# Patient Record
Sex: MALE | Race: WHITE
[De-identification: names, ages, dates, MRNs, and addresses within clinical notes are randomized per-mention and may not be internally consistent; named-entity substitution may affect disease eponyms.]

---

## 2017-07-16 ENCOUNTER — HOSPITAL ENCOUNTER (OUTPATIENT)
Dept: HOSPITAL 76 - EMS | Age: 82
Discharge: ANOTHER HOSPITAL | End: 2017-07-16
Attending: SURGERY
Payer: MEDICARE

## 2017-07-16 ENCOUNTER — HOSPITAL ENCOUNTER (EMERGENCY)
Dept: HOSPITAL 76 - ED | Age: 82
Discharge: HOME | End: 2017-07-16
Payer: MEDICARE

## 2017-07-16 VITALS — SYSTOLIC BLOOD PRESSURE: 133 MMHG | DIASTOLIC BLOOD PRESSURE: 65 MMHG

## 2017-07-16 DIAGNOSIS — I49.3: ICD-10-CM

## 2017-07-16 DIAGNOSIS — K27.9: Primary | ICD-10-CM

## 2017-07-16 DIAGNOSIS — N30.00: ICD-10-CM

## 2017-07-16 DIAGNOSIS — E03.9: ICD-10-CM

## 2017-07-16 DIAGNOSIS — Z79.82: ICD-10-CM

## 2017-07-16 DIAGNOSIS — R10.9: Primary | ICD-10-CM

## 2017-07-16 DIAGNOSIS — R00.1: ICD-10-CM

## 2017-07-16 LAB
ALBUMIN/GLOB SERPL: 1.6 {RATIO} (ref 1–2.2)
ANION GAP SERPL CALCULATED.4IONS-SCNC: 8 MMOL/L (ref 6–13)
BASOPHILS NFR BLD AUTO: 0.1 10^3/UL (ref 0–0.1)
BASOPHILS NFR BLD AUTO: 0.8 %
BILIRUB BLD-MCNC: 0.9 MG/DL (ref 0.2–1)
BUN SERPL-MCNC: 14 MG/DL (ref 6–20)
CALCIUM UR-MCNC: 9 MG/DL (ref 8.5–10.3)
CHLORIDE SERPL-SCNC: 93 MMOL/L (ref 101–111)
CO2 SERPL-SCNC: 23 MMOL/L (ref 21–32)
CREAT SERPLBLD-SCNC: 1.3 MG/DL (ref 0.6–1.2)
CUL URINE ADD CHARGE: (no result)
EOSINOPHIL # BLD AUTO: 0.1 10^3/UL (ref 0–0.7)
EOSINOPHIL NFR BLD AUTO: 0.6 %
ERYTHROCYTE [DISTWIDTH] IN BLOOD BY AUTOMATED COUNT: 13.1 % (ref 12–15)
GFRSERPLBLD MDRD-ARVRAT: 52 ML/MIN/{1.73_M2} (ref 89–?)
GLOBULIN SER-MCNC: 2.6 G/DL (ref 2.1–4.2)
GLUCOSE SERPL-MCNC: 92 MG/DL (ref 70–100)
HCT VFR BLD AUTO: 35.8 % (ref 42–52)
HGB UR QL STRIP: 12.4 G/DL (ref 14–18)
LIPASE SERPL-CCNC: 17 U/L (ref 22–51)
LYMPHOCYTES # SPEC AUTO: 1.3 10^3/UL (ref 1.5–3.5)
LYMPHOCYTES NFR BLD AUTO: 12.8 %
MCH RBC QN AUTO: 30.1 PG (ref 27–31)
MCHC RBC AUTO-ENTMCNC: 34.7 G/DL (ref 32–36)
MCV RBC AUTO: 86.8 FL (ref 80–94)
MONOCYTES # BLD AUTO: 0.8 10^3/UL (ref 0–1)
MONOCYTES NFR BLD AUTO: 8.2 %
NEUTROPHILS # BLD AUTO: 8 10^3/UL (ref 1.5–6.6)
NEUTROPHILS # SNV AUTO: 10.3 X10^3/UL (ref 4.8–10.8)
NEUTROPHILS NFR BLD AUTO: 77.6 %
NRBC # BLD AUTO: 0 /100WBC
PDW BLD AUTO: 7.6 FL (ref 7.4–11.4)
PH UR STRIP.AUTO: 7 PH (ref 5–7.5)
POTASSIUM SERPL-SCNC: 4.3 MMOL/L (ref 3.5–5)
PROT SPEC-MCNC: 6.7 G/DL (ref 6.7–8.2)
RBC MAR: 4.12 10^6/UL (ref 4.7–6.1)
SODIUM SERPLBLD-SCNC: 124 MMOL/L (ref 135–145)
SP GR UR STRIP.AUTO: <=1.005 (ref 1–1.03)
UA CHARGE (STRIP ONLY): (no result)
UA W/ MICROSCOPIC CHARGE: YES
UR CULTURE IF IND: (no result)
UROBILINOGEN UR STRIP.AUTO-MCNC: NEGATIVE MG/DL
WBC # BLD: 10.3 X10^3/UL

## 2017-07-16 PROCEDURE — 84484 ASSAY OF TROPONIN QUANT: CPT

## 2017-07-16 PROCEDURE — 74177 CT ABD & PELVIS W/CONTRAST: CPT

## 2017-07-16 PROCEDURE — 99284 EMERGENCY DEPT VISIT MOD MDM: CPT

## 2017-07-16 PROCEDURE — 85025 COMPLETE CBC W/AUTO DIFF WBC: CPT

## 2017-07-16 PROCEDURE — 80053 COMPREHEN METABOLIC PANEL: CPT

## 2017-07-16 PROCEDURE — 81003 URINALYSIS AUTO W/O SCOPE: CPT

## 2017-07-16 PROCEDURE — 96374 THER/PROPH/DIAG INJ IV PUSH: CPT

## 2017-07-16 PROCEDURE — 93005 ELECTROCARDIOGRAM TRACING: CPT

## 2017-07-16 PROCEDURE — 83690 ASSAY OF LIPASE: CPT

## 2017-07-16 PROCEDURE — 81001 URINALYSIS AUTO W/SCOPE: CPT

## 2017-07-16 PROCEDURE — 87086 URINE CULTURE/COLONY COUNT: CPT

## 2017-07-16 PROCEDURE — 36415 COLL VENOUS BLD VENIPUNCTURE: CPT

## 2017-07-16 NOTE — ED PHYSICIAN DOCUMENTATION
PD HPI ABD PAIN





- Stated complaint


Stated Complaint: ABD PX





- Chief complaint


Chief Complaint: Abd Pain





- History obtained from


History obtained from: Patient





- History of Present Illness


Timing - onset: Last night


Timing - duration: Hours


Timing - details: Gradual onset, Still present


Pain level max: 8


Pain level now: 2


Quality: Sharp, Pain


Location: Epigastric


Radiation: No: Chest, , Lower back, Left flank, Left shoulder, Right flank, 

Right shoulder, Upper back


Improved by: Laying still


Worsened by: Moving, Breathing, Position, Palpation


Associated symptoms: Constipation


Similar symptoms before: Has not had sx before


Recently seen: Not recently seen





- Additional information


Additional information: 





87-year-old male with a history of atrial fibrillation intermittent and thyroid 

disease has developed acute epigastric abdominal pain that started yesterday 

evening and was present all night.  He is now somewhat more comfortable here in 

the emergency department with the pain only in the about a 2 out of 10.  He 

does have some constipation on a regular basis but does not think this is 

related.  He has not had any other specific symptoms he has not had any nausea 

or vomiting he has not had any diarrhea.





Review of Systems


Constitutional: denies: Fever, Chills, Myalgias, Fatigue


Eyes: denies: Decreased vision


Ears: denies: Ear pain


Nose: denies: Congestion


Throat: denies: Oral lesions / sores, Sore throat


Cardiac: denies: Chest pain / pressure, Palpitations


Respiratory: denies: Dyspnea, Cough


GI: reports: Abdominal Pain.  denies: Nausea, Vomiting, Diarrhea


: denies: Dysuria, Frequency


Skin: denies: Rash


Musculoskeletal: denies: Neck pain, Back pain, Extremity pain


Neurologic: denies: Generalized weakness, Focal weakness, Numbness





PD PAST MEDICAL HISTORY





- Past Medical History


Cardiovascular: Atrial fibrillation


Endocrine/Autoimmune: HyPOthyroidism


: Benign prostate hypertrophy





- Past Surgical History


Past Surgical History: Yes





- Present Medications


Home Medications: 


 Ambulatory Orders











 Medication  Instructions  Recorded  Confirmed


 


Aspirin 81 mg 07/16/17 


 


Gabapentin [Gabapentin] 300 mg PO DAILY 07/16/17 07/16/17


 


Ipratropium Bromide  07/16/17 


 


Levofloxacin [Levaquin] 500 mg PO DAILY #7 tablet 07/16/17 


 


Levothyroxine Sodium [Synthroid] 137 mcg PO DAILY 07/16/17 07/16/17


 


Omeprazole 20 mg PO DAILY #30 tablet. 07/16/17 


 


Sucralfate [Carafate] 1 gm PO ACHS #400 ml 07/16/17 


 


Tamsulosin HCl [Tamsulosin HCl] 0.4 mg PO DAILY 07/16/17 07/16/17


 


Zolpidem Tartrate [Zolpidem 10 mg PO DAILY 07/16/17 07/16/17





Tartrate]   














- Allergies


Allergies/Adverse Reactions: 


 Allergies











Allergy/AdvReac Type Severity Reaction Status Date / Time


 


No Known Drug Allergies Allergy   Verified 07/16/17 11:12














- Social History


Does the pt smoke?: No


Smoking Status: Never smoker





- Immunizations


Immunizations are current?: Yes





PD ED PE NORMAL





- Vitals


Vital signs reviewed: Yes (hypertensive )





- General


General: Alert and oriented X 3, No acute distress, Well developed/nourished





- HEENT


HEENT: Atraumatic, PERRL





- Neck


Neck: Supple, no meningeal sign





- Cardiac


Cardiac: RRR, No murmur





- Respiratory


Respiratory: No respiratory distress, Clear bilaterally





- Abdomen


Abdomen: Soft, Other (There is midline point tenderness but no palpable 

pulsitile mass. There is no audible bruit. There is not right upper quadrant 

tenderness. )





- Back


Back: No CVA TTP, No spinal TTP





- Derm


Derm: Normal color, Warm and dry, No rash





- Extremities


Extremities: No deformity, No edema





- Neuro


Neuro: No motor deficit, No sensory deficit





- Psych


Psych: Normal mood, Normal affect





Results





- Vitals


Vitals: 


 Vital Signs - 24 hr











  07/16/17 07/16/17





  11:00 12:10


 


Temperature 36.2 C L 


 


Heart Rate 67 59 L


 


Respiratory 16 16





Rate  


 


Blood Pressure 149/123 H 124/63


 


O2 Saturation 98 97








 Oxygen











O2 Source                      Room air

















- Labs


Labs: 


 Laboratory Tests











  07/16/17 07/16/17 07/16/17





  11:00 11:00 11:00


 


WBC  10.3  


 


RBC  4.12 L  


 


Hgb  12.4 L  


 


Hct  35.8 L  


 


MCV  86.8  


 


MCH  30.1  


 


MCHC  34.7  


 


RDW  13.1  


 


Plt Count  218  


 


MPV  7.6  


 


Neut #  8.0 H  


 


Lymph #  1.3 L  


 


Mono #  0.8  


 


Eos #  0.1  


 


Baso #  0.1  


 


Absolute Nucleated RBC  0.00  


 


Nucleated RBCs  0.0  


 


Sodium   124 L 


 


Potassium   4.3 


 


Chloride   93 L 


 


Carbon Dioxide   23 


 


Anion Gap   8.0 


 


BUN   14 


 


Creatinine   1.3 H 


 


Estimated GFR (MDRD)   52 L 


 


Glucose   92 


 


Calcium   9.0 


 


Total Bilirubin   0.9 


 


AST   21 


 


ALT   14 


 


Alkaline Phosphatase   58 


 


Troponin I    0.04


 


Total Protein   6.7 


 


Albumin   4.1 


 


Globulin   2.6 


 


Albumin/Globulin Ratio   1.6 


 


Lipase   17 L 


 


Urine Color   


 


Urine Clarity   


 


Urine pH   


 


Ur Specific Gravity   


 


Urine Protein   


 


Urine Glucose (UA)   


 


Urine Ketones   


 


Urine Occult Blood   


 


Urine Nitrite   


 


Urine Bilirubin   


 


Urine Urobilinogen   


 


Ur Leukocyte Esterase   


 


Urine RBC   


 


Urine WBC   


 


Ur Squamous Epith Cells   


 


Urine Bacteria   


 


Urine Casts   


 


Ur Microscopic Review   


 


Urine Culture Comments   














  07/16/17





  13:00


 


WBC 


 


RBC 


 


Hgb 


 


Hct 


 


MCV 


 


MCH 


 


MCHC 


 


RDW 


 


Plt Count 


 


MPV 


 


Neut # 


 


Lymph # 


 


Mono # 


 


Eos # 


 


Baso # 


 


Absolute Nucleated RBC 


 


Nucleated RBCs 


 


Sodium 


 


Potassium 


 


Chloride 


 


Carbon Dioxide 


 


Anion Gap 


 


BUN 


 


Creatinine 


 


Estimated GFR (MDRD) 


 


Glucose 


 


Calcium 


 


Total Bilirubin 


 


AST 


 


ALT 


 


Alkaline Phosphatase 


 


Troponin I 


 


Total Protein 


 


Albumin 


 


Globulin 


 


Albumin/Globulin Ratio 


 


Lipase 


 


Urine Color  YELLOW


 


Urine Clarity  HAZY


 


Urine pH  7.0


 


Ur Specific Gravity  <=1.005


 


Urine Protein  NEGATIVE


 


Urine Glucose (UA)  NEGATIVE


 


Urine Ketones  NEGATIVE


 


Urine Occult Blood  NEGATIVE


 


Urine Nitrite  NEGATIVE


 


Urine Bilirubin  NEGATIVE


 


Urine Urobilinogen  0.2 (NORMAL)


 


Ur Leukocyte Esterase  MODERATE H


 


Urine RBC  0-5


 


Urine WBC  11-25 H


 


Ur Squamous Epith Cells  FEW Squamous


 


Urine Bacteria  Many H


 


Urine Casts  3-5 Hyaline Casts


 


Ur Microscopic Review  INDICATED


 


Urine Culture Comments  INDICATED














- Rads (name of study)


  ** CT abdomen and pelvis with. 


Radiology: Prelim report reviewed





PD MEDICAL DECISION MAKING





- ED course


Complexity details: reviewed old records, reviewed results, re-evaluated patient

, considered differential, d/w patient


ED course: 





87-year-old male with acute epigastric pain is a poor historian and does begin 

to recall things more efficiently as time goes on here in the emergency 

department and he does recall that he has had this aortic stent in place for a 

number of years.  He has the stent in place on prior MRI from 2012 and on that 

exam the image is similar to today.  He also has history of peptic ulcer 

disease and his pain returns here in the emergency department in the 

epigastrium and he is administered viscous lidocaine and Mylanta with 

resolution of his pain.  He is subsequently given a dose of Protonix IV.  He 

has urinary tract infection as well on evaluation of the urine and he is given 

a dose of Levaquin orally.  He is given Carafate as well.





Departure





- Departure


Disposition: 01 Home, Self Care


Clinical Impression: 


 Peptic ulcer disease





Urinary tract infection


Qualifiers:


 Urinary tract infection type: acute cystitis Hematuria presence: without 

hematuria Qualified Code(s): N30.00 - Acute cystitis without hematuria


Condition: Stable


Instructions:  ED PUD Vs Gastritis, ED UTI Cystitis Male, ED PUD


Follow-Up: 


Cesario Jay DO [Primary Care Provider] - 


Prescriptions: 


Sucralfate [Carafate] 1 gm PO ACHS #400 ml


Levofloxacin [Levaquin] 500 mg PO DAILY #7 tablet


Omeprazole 20 mg PO DAILY #30 tablet.

## 2017-07-16 NOTE — CT REPORT
EXAM:

CT ABDOMEN AND PELVIS

 

EXAM DATE: 7/16/2017 12:31 PM.

 

CLINICAL HISTORY: Epigastric pain .

 

COMPARISONS: None.

 

TECHNIQUE: Routine helical CT imaging was performed through the abdomen and pelvis. IV contrast: 100 
mL Isovue-300. Enteric contrast: yes. Reconstructions: Coronal and sagittal.

 

In accordance with CT protocol optimization, one or more of the following dose reduction techniques w
ere utilized for this exam: automated exposure control, adjustment of mA and/or KV based on patient s
ize, or use of iterative reconstructive technique.

 

FINDINGS: 

Lung Bases: Unremarkable.

 

Liver: Normal. No masses.

 

Gallbladder/Bile Ducts: Unremarkable.

 

Spleen: Normal.

 

Pancreas: Atrophic

 

Adrenal Glands: Normal.

 

Kidneys: Atrophic. No masses or hydronephrosis.

 

Peritoneal Cavity/Bowel: Extensive sigmoid diverticulosis. No free fluid, free air or adenopathy. No 
masses or acute inflammatory process. The appendix is nonvisualized but no secondary signs to suggest
 appendicitis.

 

Pelvic Organs: Normal. The bladder and visualized pelvic organs are within normal limits.

 

Vasculature: Aortoiliac stent through infrarenal abdominal aortic aneurysm. This measures 4 cm in max
imal diameter. The stent begins proximal to the renal arteries. The celiac, SMA bilateral renal arter
ies and RANDY opacify with contrast.

 

Bones: Post surgical changes left hip

 

Other: None.

 

IMPRESSION: 

1. No definite evidence of acute intra-abdominal process. 

2. Abdominal aortic aneurysm post stent placement.  Correlation with priors would be necessary to ass
ess for stability.

 

[facility]

Referring Provider Line: [phone]

 

SITE ID: [id number]

## 2017-07-16 NOTE — CT PRELIMINARY REPORT
Accession: [id number]

Exam: CT Abdomen/Pelvis W/

 

IMPRESSION: 

1. No definite evidence of acute intra-abdominal process. 

2. Abdominal aortic aneurysm post stent placement.  Correlation with priors would be necessary to ass
ess for stability.

 

[facility]

 

SITE ID: [id number]

## 2018-01-11 ENCOUNTER — HOSPITAL ENCOUNTER (OUTPATIENT)
Dept: HOSPITAL 76 - EMS | Age: 83
Discharge: ANOTHER HOSPITAL | End: 2018-01-11
Attending: SURGERY
Payer: MEDICARE

## 2018-01-11 DIAGNOSIS — R42: Primary | ICD-10-CM

## 2018-01-18 ENCOUNTER — HOSPITAL ENCOUNTER (EMERGENCY)
Dept: HOSPITAL 76 - ED | Age: 83
Discharge: HOME | End: 2018-01-18
Payer: MEDICARE

## 2018-01-18 VITALS — DIASTOLIC BLOOD PRESSURE: 73 MMHG | SYSTOLIC BLOOD PRESSURE: 164 MMHG

## 2018-01-18 DIAGNOSIS — I48.91: ICD-10-CM

## 2018-01-18 DIAGNOSIS — N40.0: ICD-10-CM

## 2018-01-18 DIAGNOSIS — Z79.82: ICD-10-CM

## 2018-01-18 DIAGNOSIS — R03.0: ICD-10-CM

## 2018-01-18 DIAGNOSIS — N30.00: Primary | ICD-10-CM

## 2018-01-18 DIAGNOSIS — E03.9: ICD-10-CM

## 2018-01-18 LAB
CLARITY UR REFRACT.AUTO: CLEAR
GLUCOSE UR QL STRIP.AUTO: NEGATIVE MG/DL
KETONES UR QL STRIP.AUTO: NEGATIVE MG/DL
NITRITE UR QL STRIP.AUTO: NEGATIVE
PH UR STRIP.AUTO: 6 PH (ref 5–7.5)
PROT UR STRIP.AUTO-MCNC: NEGATIVE MG/DL
RBC # UR STRIP.AUTO: NEGATIVE /UL
RBC # URNS HPF: (no result) /HPF (ref 0–5)
SP GR UR STRIP.AUTO: 1.01 (ref 1–1.03)
SQUAMOUS URNS QL MICRO: (no result)
UROBILINOGEN UR QL STRIP.AUTO: (no result) E.U./DL
UROBILINOGEN UR STRIP.AUTO-MCNC: NEGATIVE MG/DL

## 2018-01-18 PROCEDURE — 87086 URINE CULTURE/COLONY COUNT: CPT

## 2018-01-18 PROCEDURE — 81001 URINALYSIS AUTO W/SCOPE: CPT

## 2018-01-18 PROCEDURE — 99283 EMERGENCY DEPT VISIT LOW MDM: CPT

## 2018-01-18 PROCEDURE — 81003 URINALYSIS AUTO W/O SCOPE: CPT

## 2018-01-18 NOTE — ED PHYSICIAN DOCUMENTATION
History of Present Illness





- Stated complaint


Stated Complaint: MALE 





- Chief complaint


Chief Complaint: UTI





- History obtained from


History obtained from: Patient, Family





- History of Present Illness


Timing: Other (He had a UTI and took a course of Keflex, he was feeling better.

  But when he finished the antibiotics the urgency and frequency recurred.  

Culture was notable for mixed vivek.  He denies any fevers, flank pain or 

hematuria.  He was a little constipated last night and took a stool softener.)





Review of Systems


Constitutional: denies: Fever, Chills


GI: denies: Abdominal Pain, Nausea, Vomiting, Diarrhea, Bloody / black stool


: reports: Dysuria, Frequency





PD PAST MEDICAL HISTORY





- Past Medical History


Past Medical History: Yes


Cardiovascular: Atrial fibrillation


Endocrine/Autoimmune: HyPOthyroidism


: Benign prostate hypertrophy





- Past Surgical History


Past Surgical History: Yes





- Present Medications


Home Medications: 


 Ambulatory Orders











 Medication  Instructions  Recorded  Confirmed


 


Aspirin 81 mg DAILY 07/16/17 01/11/18


 


Gabapentin [Gabapentin] 300 mg PO DAILY 07/16/17 01/11/18


 


Ipratropium Bromide  07/16/17 


 


Levothyroxine Sodium [Synthroid] 137 mcg PO DAILY 07/16/17 01/11/18


 


Omeprazole 20 mg PO DAILY #30 tablet. 07/16/17 01/11/18


 


Sucralfate [Carafate] 1 gm PO ACHS #400 ml 07/16/17 01/11/18


 


Tamsulosin HCl [Tamsulosin HCl] 0.4 mg PO DAILY 07/16/17 01/11/18


 


Zolpidem Tartrate [Zolpidem 10 mg PO DAILY 07/16/17 01/11/18





Tartrate]   


 


Cephalexin [Keflex] 500 mg PO Q6H #28 capsule 01/11/18 


 


Nitrofurantoin Monohyd/M-Cryst 1 tab PO BID 5 Days  capsule 01/18/18 





[Macrobid 100 mg Capsule]   














- Allergies


Allergies/Adverse Reactions: 


 Allergies











Allergy/AdvReac Type Severity Reaction Status Date / Time


 


No Known Drug Allergies Allergy   Verified 01/18/18 14:52














- Social History


Does the pt smoke?: No


Smoking Status: Never smoker


Does the pt have substance abuse?: No





- Immunizations


Immunizations are current?: Yes





PD ED PE NORMAL





- Vitals


Vital signs reviewed: Yes





- General


General: Alert and oriented X 3, No acute distress





- Abdomen


Abdomen: Normal bowel sounds, Soft, Non tender





- Male 


Male : Other (Bladder scan post void = 0ml)





- Neuro


Neuro: Alert and oriented X 3, Normal speech





Results





- Vitals


Vitals: 


 Vital Signs - 24 hr











  01/18/18





  14:48


 


Temperature 36.0 C L


 


Heart Rate 65


 


Respiratory 18





Rate 


 


Blood Pressure 164/73 H


 


O2 Saturation 99








 Oxygen











O2 Source                      Room air

















- Labs


Labs: 


 Laboratory Tests











  01/18/18





  14:50


 


Urine Color  YELLOW


 


Urine Clarity  CLEAR


 


Urine pH  6.0


 


Ur Specific Gravity  1.010


 


Urine Protein  NEGATIVE


 


Urine Glucose (UA)  NEGATIVE


 


Urine Ketones  NEGATIVE


 


Urine Occult Blood  NEGATIVE


 


Urine Nitrite  NEGATIVE


 


Urine Bilirubin  NEGATIVE


 


Urine Urobilinogen  0.2 (NORMAL)


 


Ur Leukocyte Esterase  SMALL H


 


Urine RBC  0-5


 


Urine WBC  6-10 H


 


Ur Squamous Epith Cells  MOD Squamous H


 


Urine Bacteria  Rare


 


Ur Microscopic Review  INDICATED


 


Urine Culture Comments  NOT INDICATED














PD MEDICAL DECISION MAKING





- ED course


ED course: 





Urinary frequency and a soft positive urine, his urine post void residual is 0 

so I suspect this is not a prostatic tissue.





Departure





- Departure


Disposition: 01 Home, Self Care


Clinical Impression: 


 Cystitis





Condition: Good


Record reviewed to determine appropriate education?: Yes


Instructions:  ED UTI Cystitis Male


Prescriptions: 


Nitrofurantoin Monohyd/M-Cryst [Macrobid 100 mg Capsule] 1 tab PO BID 5 Days  

capsule


Comments: 


Call your doctor to arrange a follow-up appointment, make the next available 

appointment.  In the interim, return anytime if worse or if new symptoms 

develop.





Your blood pressure was elevated today on check into the emergency department.  

This does not mean that you have hypertension, it is a common phenomenon to 

come to the emergency department and have elevated blood pressure.  I recommend 

that you see your primary care physician within the week to have it rechecked 

when you are feeling better.

## 2018-02-16 ENCOUNTER — HOSPITAL ENCOUNTER (OUTPATIENT)
Dept: HOSPITAL 76 - EMS | Age: 83
Discharge: ANOTHER HOSPITAL | End: 2018-02-16
Attending: SURGERY
Payer: MEDICARE

## 2018-02-16 ENCOUNTER — HOSPITAL ENCOUNTER (INPATIENT)
Dept: HOSPITAL 76 - ED | Age: 83
LOS: 1 days | Discharge: HOME | DRG: 65 | End: 2018-02-17
Attending: NURSE PRACTITIONER | Admitting: INTERNAL MEDICINE
Payer: MEDICARE

## 2018-02-16 DIAGNOSIS — I63.9: ICD-10-CM

## 2018-02-16 DIAGNOSIS — E03.9: ICD-10-CM

## 2018-02-16 DIAGNOSIS — I48.91: ICD-10-CM

## 2018-02-16 DIAGNOSIS — N40.0: ICD-10-CM

## 2018-02-16 DIAGNOSIS — R27.8: Primary | ICD-10-CM

## 2018-02-16 DIAGNOSIS — I50.9: ICD-10-CM

## 2018-02-16 DIAGNOSIS — R42: Primary | ICD-10-CM

## 2018-02-16 DIAGNOSIS — N39.0: ICD-10-CM

## 2018-02-16 DIAGNOSIS — I48.0: ICD-10-CM

## 2018-02-16 DIAGNOSIS — N30.00: ICD-10-CM

## 2018-02-16 LAB
ALBUMIN DIAFP-MCNC: 3.7 G/DL (ref 3.2–5.5)
ALBUMIN/GLOB SERPL: 1.2 {RATIO} (ref 1–2.2)
ALP SERPL-CCNC: 74 IU/L (ref 42–121)
ALT SERPL W P-5'-P-CCNC: 15 IU/L (ref 10–60)
ANION GAP SERPL CALCULATED.4IONS-SCNC: 12 MMOL/L (ref 6–13)
AST SERPL W P-5'-P-CCNC: 14 IU/L (ref 10–42)
BASOPHILS NFR BLD AUTO: 0.1 10^3/UL (ref 0–0.1)
BASOPHILS NFR BLD AUTO: 1.5 %
BILIRUB BLD-MCNC: 1 MG/DL (ref 0.2–1)
BUN SERPL-MCNC: 17 MG/DL (ref 6–20)
CALCIUM UR-MCNC: 9.1 MG/DL (ref 8.5–10.3)
CHLORIDE SERPL-SCNC: 100 MMOL/L (ref 101–111)
CLARITY UR REFRACT.AUTO: CLEAR
CO2 SERPL-SCNC: 23 MMOL/L (ref 21–32)
CREAT SERPLBLD-SCNC: 1.5 MG/DL (ref 0.6–1.2)
EOSINOPHIL # BLD AUTO: 0.2 10^3/UL (ref 0–0.7)
EOSINOPHIL NFR BLD AUTO: 2.4 %
ERYTHROCYTE [DISTWIDTH] IN BLOOD BY AUTOMATED COUNT: 13.6 % (ref 12–15)
GFRSERPLBLD MDRD-ARVRAT: 44 ML/MIN/{1.73_M2} (ref 89–?)
GLOBULIN SER-MCNC: 3 G/DL (ref 2.1–4.2)
GLUCOSE SERPL-MCNC: 87 MG/DL (ref 70–100)
GLUCOSE UR QL STRIP.AUTO: NEGATIVE MG/DL
HGB UR QL STRIP: 12.7 G/DL (ref 14–18)
KETONES UR QL STRIP.AUTO: NEGATIVE MG/DL
LIPASE SERPL-CCNC: 14 U/L (ref 22–51)
LYMPHOCYTES # SPEC AUTO: 1.5 10^3/UL (ref 1.5–3.5)
LYMPHOCYTES NFR BLD AUTO: 22.4 %
MCH RBC QN AUTO: 28.3 PG (ref 27–31)
MCHC RBC AUTO-ENTMCNC: 33.2 G/DL (ref 32–36)
MCV RBC AUTO: 85.3 FL (ref 80–94)
MONOCYTES # BLD AUTO: 0.7 10^3/UL (ref 0–1)
MONOCYTES NFR BLD AUTO: 10.2 %
NEUTROPHILS # BLD AUTO: 4.3 10^3/UL (ref 1.5–6.6)
NEUTROPHILS # SNV AUTO: 6.8 X10^3/UL (ref 4.8–10.8)
NEUTROPHILS NFR BLD AUTO: 63.5 %
NITRITE UR QL STRIP.AUTO: NEGATIVE
PDW BLD AUTO: 7.3 FL (ref 7.4–11.4)
PH UR STRIP.AUTO: 6 PH (ref 5–7.5)
PLATELET # BLD: 287 10^3/UL (ref 130–450)
PROT SPEC-MCNC: 6.7 G/DL (ref 6.7–8.2)
PROT UR STRIP.AUTO-MCNC: NEGATIVE MG/DL
RBC # UR STRIP.AUTO: (no result) /UL
RBC # URNS HPF: (no result) /HPF (ref 0–5)
RBC MAR: 4.5 10^6/UL (ref 4.7–6.1)
SODIUM SERPLBLD-SCNC: 135 MMOL/L (ref 135–145)
SP GR UR STRIP.AUTO: 1.02 (ref 1–1.03)
SQUAMOUS URNS QL MICRO: (no result)
UROBILINOGEN UR QL STRIP.AUTO: (no result) E.U./DL
UROBILINOGEN UR STRIP.AUTO-MCNC: NEGATIVE MG/DL

## 2018-02-16 PROCEDURE — 81003 URINALYSIS AUTO W/O SCOPE: CPT

## 2018-02-16 PROCEDURE — 99284 EMERGENCY DEPT VISIT MOD MDM: CPT

## 2018-02-16 PROCEDURE — 85610 PROTHROMBIN TIME: CPT

## 2018-02-16 PROCEDURE — 80053 COMPREHEN METABOLIC PANEL: CPT

## 2018-02-16 PROCEDURE — 93880 EXTRACRANIAL BILAT STUDY: CPT

## 2018-02-16 PROCEDURE — 81001 URINALYSIS AUTO W/SCOPE: CPT

## 2018-02-16 PROCEDURE — 83721 ASSAY OF BLOOD LIPOPROTEIN: CPT

## 2018-02-16 PROCEDURE — 87086 URINE CULTURE/COLONY COUNT: CPT

## 2018-02-16 PROCEDURE — 80061 LIPID PANEL: CPT

## 2018-02-16 PROCEDURE — 99283 EMERGENCY DEPT VISIT LOW MDM: CPT

## 2018-02-16 PROCEDURE — 83690 ASSAY OF LIPASE: CPT

## 2018-02-16 PROCEDURE — 84484 ASSAY OF TROPONIN QUANT: CPT

## 2018-02-16 PROCEDURE — 70450 CT HEAD/BRAIN W/O DYE: CPT

## 2018-02-16 PROCEDURE — 85025 COMPLETE CBC W/AUTO DIFF WBC: CPT

## 2018-02-16 PROCEDURE — 93306 TTE W/DOPPLER COMPLETE: CPT

## 2018-02-16 PROCEDURE — 70551 MRI BRAIN STEM W/O DYE: CPT

## 2018-02-16 PROCEDURE — 93005 ELECTROCARDIOGRAM TRACING: CPT

## 2018-02-16 PROCEDURE — 99285 EMERGENCY DEPT VISIT HI MDM: CPT

## 2018-02-16 PROCEDURE — 36415 COLL VENOUS BLD VENIPUNCTURE: CPT

## 2018-02-16 RX ADMIN — NITROFURANTOIN MONOHYDRATE/MACROCRYSTALLINE SCH: 25; 75 CAPSULE ORAL at 21:00

## 2018-02-16 RX ADMIN — SUCRALFATE SCH: 1 SUSPENSION ORAL at 21:00

## 2018-02-16 RX ADMIN — SODIUM CHLORIDE SCH MLS/HR: 9 INJECTION, SOLUTION INTRAVENOUS at 20:25

## 2018-02-16 RX ADMIN — SODIUM CHLORIDE, PRESERVATIVE FREE SCH: 5 INJECTION INTRAVENOUS at 21:00

## 2018-02-16 NOTE — ED PHYSICIAN DOCUMENTATION
History of Present Illness





- Stated complaint


Stated Complaint: VERTIGO





- Chief complaint


Chief Complaint: General





- History obtained from


History obtained from: Patient, EMS





- History of Present Illness


Timing: Today





- Additonal information


Additional information: 





The patient is an 88-year-old male, poor historian, who presents via ambulance 

complaining of dizziness that started this morning about 9 AM.  He describes 

the dizziness as feeling off balance when trying to walk.  He denies falling.  

He normally walks without assistance.  He denies headache, nausea, vomiting, 

visual disturbance, numbness, or weakness.  He reports having history of 

similar symptoms about 6 or 7 months ago, but his medical record does not 

corroborate that.  Past medical history is significant for atrial fibrillation 

and for AAA with stent placement. Recent medical history is significant for 

urinary tract infection 6 weeks ago, with recurrence 5 weeks ago.





Review of Systems


Constitutional: reports: Other (Dizziness).  denies: Fever


Ears: denies: Tinnitus/ringing


Nose: denies: Congestion


Throat: denies: Sore throat


Cardiac: denies: Chest pain / pressure


Respiratory: denies: Dyspnea, Cough


GI: denies: Abdominal Pain, Nausea, Vomiting


: denies: Dysuria


Skin: denies: Rash


Musculoskeletal: denies: Neck pain, Back pain, Extremity pain


Neurologic: reports: Other (Dizziness when trying to walk.).  denies: Focal 

weakness, Numbness, Altered mental status, Headache





PD PAST MEDICAL HISTORY





- Past Medical History


Cardiovascular: Atrial fibrillation


Endocrine/Autoimmune: HyPOthyroidism


: Benign prostate hypertrophy


Other Past Medical History: Abdominal aortic anuerysm





- Past Surgical History


Past Surgical History: Yes





- Present Medications


Home Medications: 


 Ambulatory Orders











 Medication  Instructions  Recorded  Confirmed


 


Aspirin 81 mg DAILY 07/16/17 01/11/18


 


Gabapentin [Gabapentin] 300 mg PO DAILY 07/16/17 01/11/18


 


Ipratropium Bromide  07/16/17 


 


Levothyroxine Sodium [Synthroid] 137 mcg PO DAILY 07/16/17 01/11/18


 


Omeprazole 20 mg PO DAILY #30 tablet. 07/16/17 01/11/18


 


Sucralfate [Carafate] 1 gm PO ACHS #400 ml 07/16/17 01/11/18


 


Tamsulosin HCl [Tamsulosin HCl] 0.4 mg PO DAILY 07/16/17 01/11/18


 


Zolpidem Tartrate [Zolpidem 10 mg PO DAILY 07/16/17 01/11/18





Tartrate]   


 


Cephalexin [Keflex] 500 mg PO Q6H #28 capsule 01/11/18 


 


Nitrofurantoin Monohyd/M-Cryst 1 tab PO BID 5 Days  capsule 01/18/18 





[Macrobid 100 mg Capsule]   














- Allergies


Allergies/Adverse Reactions: 


 Allergies











Allergy/AdvReac Type Severity Reaction Status Date / Time


 


No Known Drug Allergies Allergy   Verified 02/16/18 16:59














- Social History


Does the pt smoke?: No


Smoking Status: Never smoker


Does the pt drink ETOH?: No


Does the pt have substance abuse?: No





- Immunizations


Immunizations are current?: Yes





- POLST


Patient has POLST: No





PD ED PE NORMAL





- Vitals


Vital signs reviewed: Yes (Hypertensive.)





- General


General: Alert and oriented X 3, Well developed/nourished





- HEENT


HEENT: Atraumatic, PERRL, Moist mucous membranes, Pharynx benign





- Neck


Neck: Supple, no meningeal sign, No adenopathy, No JVD





- Cardiac


Cardiac: Other (Regular rate, irregularly irregular rhythm.)





- Respiratory


Respiratory: No respiratory distress, Clear bilaterally





- Abdomen


Abdomen: Soft, Non tender





- Back


Back: No CVA TTP





- Derm


Derm: No rash





- Extremities


Extremities: No edema, No calf tenderness / cord





- Neuro


Neuro: Alert and oriented X 3, CNs 2-12 intact, No motor deficit, No sensory 

deficit, Normal speech, Other (Ataxia, with imbalance when standing, with 

tendency to fall toward the right and backwards.)


Eye Opening: Spontaneous


Motor: Obeys Commands


Verbal: Oriented


GCS Score: 15





Results





- Vitals


Vitals: 


 Vital Signs - 24 hr











  02/16/18 02/16/18





  16:53 18:25


 


Temperature 36.4 C L 36.5 C


 


Heart Rate 79 75


 


Respiratory 20 26 H





Rate  


 


Blood Pressure 156/73 H 163/70 H


 


O2 Saturation 100 99








 Oxygen











O2 Source                      Room air

















- EKG (time done)


  ** 17:11


Rate: Rate (enter#) (73)


Rhythm: NSR


Axis: Normal


Intervals: Prolonged NH


QRS: Poor R wave progression


Ischemia: Non specific changes


Compare to prior EKG: Unchanged from prior EKG


Computer interpretation: Agree with computer





- Rads (name of study)


  ** head CT


Radiology: Prelim report reviewed, EMP read contemporaneously, See rad report ( 

Generalized age-related cortical atrophic changes without evidence of acute 

intracranial abnormality.)





PD MEDICAL DECISION MAKING





- ED course


Complexity details: reviewed old records, reviewed results, re-evaluated patient

, considered differential, d/w patient, d/w consultant


ED course: 


The patient's presentation is significant for acute ataxia, concerning for 

cerebellar etiology.  Head CT reveals no acute intracranial abnormality.  

Besides a very slight right pronator drift, no other focal neurologic deficits 

are detected.  MRI would be a more sensitive study for evaluating for possible 

cerebellar stroke.  I discussed his presentation with Dr. Springer, the hospitalist

, who will write further orders.


CBC and chemistry panel are unremarkable, but urinalysis is significant for 

pyuria and bacteriuria, consistent with recurrent UTI.





Departure





- Departure


Disposition: 66 [facility] DC/Xfer


Clinical Impression: 


 Acute ataxia





Urinary tract infection


Qualifiers:


 Urinary tract infection type: acute cystitis Hematuria presence: without 

hematuria Qualified Code(s): N30.00 - Acute cystitis without hematuria





Condition: Stable


Discharge Date/Time: 02/16/18 19:24

## 2018-02-16 NOTE — CT REPORT
EXAM:

CT HEAD

 

EXAM DATE: 2/16/2018 05:38 PM.

 

CLINICAL HISTORY: Ataxia, new onset. Dizziness. Loss of balance. Difficulty standing.

 

COMPARISON: None.

 

TECHNIQUE: Multiaxial CT images were obtained from the foramen magnum to the vertex. Reformats: Coron
al. IV contrast: None. 

 

In accordance with CT protocol optimization, one or more of the following dose reduction techniques w
ere utilized for this exam: automated exposure control, adjustment of mA and/or KV based on patient s
ize, or use of iterative reconstructive technique.

 

FINDINGS:

Parenchyma: No intraparenchymal hemorrhage. No evidence of mass, midline shift, or CT findings of acu
te infarction. Gray-white differentiation is distinct. Diffuse chronic microangiopathic white matter 
changes are evident.

 

Extraaxial Spaces: Normal for age. No subdural or epidural collections identified.

 

Ventricles: The ventricles and cortical sulci are enlarged, consistent with age-related tissue loss.

 

Sinuses and orbits: Imaged paranasal sinuses, orbits, and mastoids show no significant abnormality.

 

Bones: No evidence of fracture or calvarial defect.

 

Other: None.

 

IMPRESSION: Generalized age-related cortical atrophic changes without evidence of acute intracranial 
abnormality.

 

[facility]

Referring Provider Line: [phone]

 

SITE ID: [id number]

## 2018-02-16 NOTE — CT PRELIMINARY REPORT
Accession: [id number]

Exam: CT HEAD W/O

 

IMPRESSION: Generalized age-related cortical atrophic changes without evidence of acute intracranial 
abnormality.

 

[facility]

 

SITE ID: [id number]

## 2018-02-17 VITALS — SYSTOLIC BLOOD PRESSURE: 129 MMHG | DIASTOLIC BLOOD PRESSURE: 71 MMHG

## 2018-02-17 LAB
CHOLEST SERPL-MCNC: 173 MG/DL
HDLC SERPL-MCNC: 28 MG/DL
HDLC SERPL: 6.2 {RATIO} (ref ?–5)
INR PPP: 1.2 (ref 0.8–1.2)
LDLC SERPL CALC-MCNC: 114 MG/DL
LDLC/HDLC SERPL: 4.1 {RATIO} (ref ?–3.6)
PROTHROM ACT/NOR PPP: 13 SECS (ref 9.9–12.6)
VLDLC SERPL-SCNC: 31 MG/DL

## 2018-02-17 RX ADMIN — NITROFURANTOIN MONOHYDRATE/MACROCRYSTALLINE SCH MG: 25; 75 CAPSULE ORAL at 08:31

## 2018-02-17 RX ADMIN — SODIUM CHLORIDE, PRESERVATIVE FREE SCH: 5 INJECTION INTRAVENOUS at 06:39

## 2018-02-17 RX ADMIN — SODIUM CHLORIDE, PRESERVATIVE FREE SCH: 5 INJECTION INTRAVENOUS at 13:58

## 2018-02-17 RX ADMIN — SUCRALFATE SCH GM: 1 SUSPENSION ORAL at 12:17

## 2018-02-17 RX ADMIN — SUCRALFATE SCH GM: 1 SUSPENSION ORAL at 15:19

## 2018-02-17 RX ADMIN — SODIUM CHLORIDE SCH MLS/HR: 9 INJECTION, SOLUTION INTRAVENOUS at 05:09

## 2018-02-17 RX ADMIN — SUCRALFATE SCH GM: 1 SUSPENSION ORAL at 06:38

## 2018-02-17 NOTE — DISCHARGE SUMMARY
Discharge Summary


Admit Date: 02/16/18


Discharge Date: 02/17/18


Discharging Provider: JOSE LUIS Lowery


Primary Care Provider: Cesario Jay


Code Status: Attempt Resuscitation


Condition at Discharge: Good


Discharge Disposition: 01 Home, Self Care





- DIAGNOSES


Admission Diagnoses: 


Ataxia, unspecified (R27.0) 


Weakness (R53.1) 


Urinary tract infection, site not specified (N39.0) 


Other specified postprocedural states (Z98.890) 


Paroxysmal atrial fibrillation (I48.0)





Discharge Diagnoses with Status of Each Condition: 


CVA (cerebral vascular accident) (I63.9) New on this admission, no further 

treatment.


Ataxia (R27.0) resolved.


Weakness (R53.1) resolved.


Recurrent UTI (N39.0) resolved.


S/P AAA repair using straight graft (Z98.890) chronic, stable.


Paroxysmal A-fib (I48.0) chronic, stable.








- HPI


History of Present Illness: 


Rosendo Garcia is an elderly 88-year old white male with a past medical history 

of paroxysmal atrial fibrillation, AAA-repaired, hypothyroidism, and BPH.  The 

patient has had a UTI twice in the past 6 weeks.  The symptoms at that time was 

lightheadedness and was treated in the ED with hydration and sent home on 

antibiotics.  This time when he presented to the ED he not only had a complaint 

of lightheadedness, but now with difficulty walking, which is described by him 

as, "feeling off balance".  He was noted to have a leaning backward when 

attempting to ambulate.  Patient denies a history of stroke or TIA, palpitations

, chest pain or shortness of breath. The patient will be admitted overnight for 

a TIA/CVA work up including telemetry monitoring, head MRI, troponins, 

orthostatics, and an echocardiogram.  








- HOSPITAL COURSE


Hospital Course: 


The patient had an uneventful stay with the exception of head MRI results 

showing a CVA located in the left paramedian medulla and measures 7 x 4 mm, 

which may represent an acute infarct.  Also, an echocardiogram was completed 

which shows a reduced EF of 40-45%, and a moderately elevated right heart 

pressure with an RVSP of 43mmHg.  Telemetry monitoring showed the patient to be 

in a sinus rhythm overnight, orthostatic vital signs were inconclusive, and the 

patient passed his physical therapy evaluation.  The patient was very anxious 

to get discharged due to a visit from a long lost son, which he has not been in 

contact with for greater than 20 years.  Plans to phone the patient and to 

follow up with PCP in one week.  I suggest to use medications to treat his CHF, 

which I did not see on his medication list such as a beta blocker.  





- ALLERGIES


Allergies/Adverse Reactions: 


 Allergies











Allergy/AdvReac Type Severity Reaction Status Date / Time


 


No Known Drug Allergies Allergy   Verified 02/16/18 16:59














- MEDICATIONS


Home Medications: 


 Ambulatory Orders











 Medication  Instructions  Recorded  Confirmed


 


Aspirin 81 mg DAILY 07/16/17 02/17/18


 


Tamsulosin HCl 0.4 mg PO DAILY 07/16/17 02/17/18


 


Zolpidem Tartrate 10 mg PO QPM PRN 07/16/17 02/17/18


 


Atorvastatin Calcium 20 mg PO QPM #30 tablet 02/17/18 


 


Latanoprost 0.005% Ophth Drops 1 drops EACHEYE QPM 02/17/18 02/17/18





[Xalatan Ophth Drops]   


 


Levothyroxine Sodium [Synthroid] 100 mcg PO DAILY #30 tablet 02/17/18 


 


Pilocarpine HCl [Salagen] 5 mg PO TID PRN 02/17/18 02/17/18


 


Timolol 0.5% Ophth Drops [Timoptic 1 drops EACHEYE DAILY 02/17/18 02/17/18





0.5% Ophth Drops]   


 


raNITIdine [Zantac] 150 mg PO DAILY 02/17/18 02/17/18


 


Atorvastatin Calcium 20 mg PO QPM #30 tablet 02/19/18 


 


Levothyroxine Sodium [Synthroid] 100 mcg PO DAILY #30 tablet 02/19/18 














- PHYSICAL EXAM AT DISCHARGE


General Appearance: positive: No acute distress, Alert, Anxious


Eyes Bilateral: positive: Normal inspection, PERRL


ENT: positive: ENT inspection nml, Pharynx nml, No signs of dehydration


Neck: positive: Nml inspection, Thyroid nml, No JVD, Trachea midline


Respiratory: positive: Chest non-tender, No respiratory distress, Breath sounds 

nml


Cardiovascular: positive: Regular rate & rhythm, No gallop, Decreased pulse(s)


Peripheral Pulses: positive: 1+


Abdomen: positive: Non-tender, No organomegaly, Nml bowel sounds, No distention


Back: positive: Nml inspection


Skin: positive: No rash, Warm, Dry


Extremities: positive: Non-tender


Neurologic/Psychiatric: positive: Oriented x3, CN's nml (2-12), Motor nml, 

Sensation nml, Depressed mood/affect, Other (Kake)


Reflexes: Bicep (R): 3+, Bicep (L): 3+





- LABS


Result Diagrams: 


 02/16/18 18:40





 02/16/18 18:40





- DIAGNOSTIC IMAGING


Diagnostic Imaging Results: Final report reviewed


Diagnostic Imaging Results Comments: 


EXAM: MRI BRAIN WITHOUT CONTRAST 





EXAM DATE: 2/17/2018 10:30 AM. 





CLINICAL HISTORY: Poss cerebellar CVA. 





COMPARISON: CT head 02/16/2018 





TECHNIQUE: Multiplanar, multisequence T1-weighted and fluid-sensitive MR 

sequences of the brain were performed. Sequences optimized for routine 

evaluation. Other: None. IV Contrast: None. 





FINDINGS: 


Brain Volume: Moderate diffuse cerebral and cerebellar volume loss with exvacuo 

dilatation of the ventricles and sulci, appropriate for age. 





Parenchyma/Dura: There is a 7 x 4 mm faint focus of restricted diffusion within 

the left paramedian medulla (series 505 image 40), which may represent an acute 

infarct, less than one week old. No mass or acute hemorrhage. Chronic lacunar 

infarcts within bilateral basal ganglia and left cerebellar hemisphere. 

Moderate T2/FLAIR hyperintense periventricular, deep, and subcortical white 

matter lesions within cerebral hemispheres bilaterally and within the solomon 

centrally. There is a focus of susceptibility artifact within the left 

posterior frontal lobe (series 801 image 18), likely representing remote 

microhemorrhage. 





Ventricles/Cisterns: No hydrocephalus. No abnormal extra-axial fluid collection 

or hemorrhage. 





Orbits: Status post bilateral lens replacement surgery. Otherwise unremarkable. 





Sella Turcica: The pituitary gland, cavernous sinuses, suprasellar cistern and 

optic chiasm are unremarkable. 





IAC: Symmetric and unremarkable. 





Vasculature: Normal signal flow void is seen in the major arterial structures 

at the skull base. 





Sinuses: No acute appearing sinus disease. 





Bones: No focal pathologic appearing marrow signal changes. 





Other: None. 





IMPRESSION: 


1. A 7 x 4 mm faint focus of suggested restricted diffusion within the left 

paramedian medulla (series 505 image 40), May represent an acute infarct, less 

than one week old. 





2. Chronic lacunar infarcts within bilateral basal ganglia and left cerebellar 

hemisphere. 





3. Moderate T2/FLAIR hyperintense periventricular, deep, and subcortical white 

matter lesions within cerebral hemispheres bilaterally and within the solomon 

centrally. While nonspecific, these are favored to represent sequela of chronic 

microangiopathy. 





4. There is a focus of susceptibility artifact within the left posterior 

frontal lobe (series 801 image 18), likely representing remote microhemorrhage. 





5. Moderate diffuse cerebral and cerebellar volume loss with exvacuo dilatation 

of the ventricles and sulci, appropriate for age. 





EXAM: CAROTID DOPPLER ULTRASOUND 





EXAM DATE: 02/17/2018 08:40 AM. 





CLINICAL HISTORY: CVA versus TIA. 





COMPARISON: None. 





TECHNIQUE: Real-time sonographic vascular imaging was performed by the 

sonographer through the carotid arterial system with a linear transducer 

utilizing color-flow, Doppler flow and spectral analysis. Multiple 

representative static images were saved for review. 





FINDINGS: Right: RCCA Prox: PSV 92 cm/sec. 


RCCA Dist: PSV 89 cm/sec, EDV 17 cm/sec. 


RECA: PSV 96 cm/sec. 


R Bulb: PSV 82 cm/sec, EDV 13 cm/sec, ICA/CCA ratio 0.92. 


MOHINDER Prox:  cm/sec, EDV 35 cm/sec, ICA/CCA ratio 1.57. 


MOHINDER Mid:  cm/sec, EDV 34 cm/sec, ICA/CCA ratio 1.39. 


MOHINDER Dist:  cm/sec, EDV 29 cm/sec, ICA/CCA ratio 1.33. 


RVA: PSV 65 cm/sec. RVA flow direction: Antegrade. 





Left: LCCA Prox: PSV 89 cm/sec. 


LCCA Dist: PSV 89 cm/sec, EDV 7 cm/sec. 


LECA: PSV 84 cm/sec. 


L Bulb: PSV 42 cm/sec, EDV 5 cm/sec, ICA/CCA ratio 0.47. 


LICA Prox: PSV 79 cm/sec, EDV 19 cm/sec, ICA/CCA ratio 0.88. 


LICA Mid: PSV 82 cm/sec, EDV 20 cm/sec, ICA/CCA ratio 0.92. 


LICA Dist: PSV 79 cm/sec, EDV 20 cm/sec, ICA/CCA ratio 0.88. 


LVA: PSV 20 cm/sec. LVA flow direction: Antegrade. 





Mild-to-moderate atherosclerotic plaquing is present bilaterally, most 

pronounced at the carotid bulbs. 





Other: None. 





IMPRESSION: 


1. Right ICA: 50-69% proximal right ICA stenosis by PSV. 


2. Left ICA: No hemodynamically significant stenosis is identified. 








EXAM: CT HEAD 





EXAM DATE: 2/16/2018 05:38 PM. 





CLINICAL HISTORY: Ataxia, new onset. Dizziness. Loss of balance. Difficulty 

standing. 





COMPARISON: None. 





TECHNIQUE: Multiaxial CT images were obtained from the foramen magnum to the 

vertex. Reformats: Coronal. IV contrast: None. 





In accordance with CT protocol optimization, one or more of the following dose 

reduction techniques were utilized for this exam: automated exposure control, 

adjustment of mA and/or KV based on patient size, or use of iterative 

reconstructive technique. 





FINDINGS: Parenchyma: No intraparenchymal hemorrhage. No evidence of mass, 

midline shift, or CT findings of acute infarction. Gray-white differentiation 

is distinct. Diffuse chronic microangiopathic white matter changes are evident. 





Extraaxial Spaces: Normal for age. No subdural or epidural collections 

identified. 





Ventricles: The ventricles and cortical sulci are enlarged, consistent with age-

related tissue loss. 





Sinuses and orbits: Imaged paranasal sinuses, orbits, and mastoids show no 

significant abnormality. 





Bones: No evidence of fracture or calvarial defect. 





Other: None. 





IMPRESSION: Generalized age-related cortical atrophic changes without evidence 

of acute intracranial abnormality. 








- FOLLOW UP


Follow Up: 


Disposition: 01 Home, Self Care


Condition: Good


Prescriptions: 


Atorvastatin Calcium 20 mg PO QPM #30 tablet


Levothyroxine Sodium [Synthroid] 100 mcg PO DAILY #30 tablet


Diet: Regular


Activity Restrictions: No Restrictions


Shower Restrictions: No


Driving Restrictions: No


Weight Bearing: Full Weight


Additional Instructions or Follow Up instructions: 


You were watched overnight after you came to the ED for dizziness.  Your 

symptoms were worrisome for TIA or stroke.  You underwent a full syncope work 

up. A head MRI was completed and showed a 7 x 4 mm faint focus within the left 

paramedian medulla, which may represent an acute infarct, less than one week 

old.  You passed a physical therapy evaluation and it was felt that you were at 

your baseline ambulation.  An echocardiogram was completed and results are 

pending.  





To help prevent further strokes, it is recommended that you take an anti-

cholesterol pill.  This medication was sent to your pharmacy.





Please rest if you are tired, and most of all enjoy the visit with your son 

tonight.





Please see your PCP in the week as a follow up to this hospital stay.














- TIME SPENT


Time Spent in Discharge (Minutes): 45

## 2018-02-17 NOTE — PROVIDER PROGRESS NOTE
Subjective





- Prog Note Date


Prog Note Date: 02/17/18


Prog Note Time: 08:42





- Subjective


Pt reports feeling: Improved





Objective





- Vital Signs/Intake & Output


Vital Signs: 


 Vital Signs x48h











  Temp Pulse Resp BP Pulse Ox


 


 02/17/18 06:08  36.3 C L  67  18  121/51 L  98











Intake & Output: 


 Intake & Output











 02/14/18 02/15/18 02/16/18 02/17/18





 23:59 23:59 23:59 23:59


 


Intake Total   360 1023.333


 


Output Total   150 475


 


Balance   210 548.333














- Lab Results


Fish Bones: 


 02/16/18 18:40





 02/16/18 18:40


Other Labs: 


 Lab Results x24hrs











  02/17/18 02/17/18 02/17/18 Range/Units





  05:15 05:15 00:30 


 


WBC     (4.8-10.8)  x10^3/uL


 


RBC     (4.70-6.10)  10^6/uL


 


Hgb     (14.0-18.0)  g/dL


 


Hct     (42.0-52.0)  %


 


MCV     (80.0-94.0)  fL


 


MCH     (27.0-31.0)  pg


 


MCHC     (32.0-36.0)  g/dL


 


RDW     (12.0-15.0)  %


 


Plt Count     (130-450)  10^3/uL


 


MPV     (7.4-11.4)  fL


 


Neut #     (1.5-6.6)  10^3/uL


 


Lymph #     (1.5-3.5)  10^3/uL


 


Mono #     (0.0-1.0)  10^3/uL


 


Eos #     (0.0-0.7)  10^3/uL


 


Baso #     (0.0-0.1)  10^3/uL


 


Absolute Nucleated RBC     x10^3/uL


 


Nucleated RBC %     /100WBC


 


PT   13.0 H   (9.9-12.6)  secs


 


INR   1.2   (0.8-1.2)  


 


Sodium     (135-145)  mmol/L


 


Potassium     (3.5-5.0)  mmol/L


 


Chloride     (101-111)  mmol/L


 


Carbon Dioxide     (21-32)  mmol/L


 


Anion Gap     (6-13)  


 


BUN     (6-20)  mg/dL


 


Creatinine     (0.6-1.2)  mg/dL


 


Estimated GFR (MDRD)     (>89)  


 


Glucose     ()  mg/dL


 


Calcium     (8.5-10.3)  mg/dL


 


Total Bilirubin     (0.2-1.0)  mg/dL


 


AST     (10-42)  IU/L


 


ALT     (10-60)  IU/L


 


Alkaline Phosphatase     ()  IU/L


 


Troponin I    < 0.04  (<0.49)  ng/mL


 


Total Protein     (6.7-8.2)  g/dL


 


Albumin     (3.2-5.5)  g/dL


 


Globulin     (2.1-4.2)  g/dL


 


Albumin/Globulin Ratio     (1.0-2.2)  


 


Triglycerides  154 H   ( - 149) mg/dL


 


Cholesterol  173   ( - 199) mg/dL


 


LDL Cholesterol, Calc  114   ( - 129) mg/dL


 


VLDL Cholesterol  31    mg/dL


 


HDL Cholesterol  28 L   (60 - ) mg/dL


 


LDL/HDL Ratio  4.1    (<3.6)  


 


Cholesterol/HDL Ratio  6.2    (<5.0)  


 


Lipase     (22-51)  U/L


 


Urine Color     


 


Urine Clarity     (CLEAR)  


 


Urine pH     (5.0-7.5)  PH


 


Ur Specific Gravity     (1.002-1.030)  


 


Urine Protein     (NEGATIVE)  mg/dL


 


Urine Glucose (UA)     (NEGATIVE)  mg/dL


 


Urine Ketones     (NEGATIVE)  mg/dL


 


Urine Occult Blood     (NEGATIVE)  


 


Urine Nitrite     (NEGATIVE)  


 


Urine Bilirubin     (NEGATIVE)  


 


Urine Urobilinogen     (NORMAL)  E.U./dL


 


Ur Leukocyte Esterase     (NEGATIVE)  


 


Urine RBC     (0-5)  /HPF


 


Urine WBC     (0-3)  /HPF


 


Ur Squamous Epith Cells     (<= Few)  


 


Urine Bacteria     (None Seen)  /HPF


 


Ur Microscopic Review     


 


Urine Culture Comments     














  02/16/18 02/16/18 02/16/18 Range/Units





  18:50 18:40 18:40 


 


WBC     (4.8-10.8)  x10^3/uL


 


RBC     (4.70-6.10)  10^6/uL


 


Hgb     (14.0-18.0)  g/dL


 


Hct     (42.0-52.0)  %


 


MCV     (80.0-94.0)  fL


 


MCH     (27.0-31.0)  pg


 


MCHC     (32.0-36.0)  g/dL


 


RDW     (12.0-15.0)  %


 


Plt Count     (130-450)  10^3/uL


 


MPV     (7.4-11.4)  fL


 


Neut #     (1.5-6.6)  10^3/uL


 


Lymph #     (1.5-3.5)  10^3/uL


 


Mono #     (0.0-1.0)  10^3/uL


 


Eos #     (0.0-0.7)  10^3/uL


 


Baso #     (0.0-0.1)  10^3/uL


 


Absolute Nucleated RBC     x10^3/uL


 


Nucleated RBC %     /100WBC


 


PT     (9.9-12.6)  secs


 


INR     (0.8-1.2)  


 


Sodium    135  (135-145)  mmol/L


 


Potassium    4.3  (3.5-5.0)  mmol/L


 


Chloride    100 L  (101-111)  mmol/L


 


Carbon Dioxide    23  (21-32)  mmol/L


 


Anion Gap    12.0  (6-13)  


 


BUN    17  (6-20)  mg/dL


 


Creatinine    1.5 H  (0.6-1.2)  mg/dL


 


Estimated GFR (MDRD)    44 L  (>89)  


 


Glucose    87  ()  mg/dL


 


Calcium    9.1  (8.5-10.3)  mg/dL


 


Total Bilirubin    1.0  (0.2-1.0)  mg/dL


 


AST    14  (10-42)  IU/L


 


ALT    15  (10-60)  IU/L


 


Alkaline Phosphatase    74  ()  IU/L


 


Troponin I   < 0.04   (<0.49)  ng/mL


 


Total Protein    6.7  (6.7-8.2)  g/dL


 


Albumin    3.7  (3.2-5.5)  g/dL


 


Globulin    3.0  (2.1-4.2)  g/dL


 


Albumin/Globulin Ratio    1.2  (1.0-2.2)  


 


Triglycerides    ( - 149) mg/dL


 


Cholesterol    ( - 199) mg/dL


 


LDL Cholesterol, Calc    ( - 129) mg/dL


 


VLDL Cholesterol     mg/dL


 


HDL Cholesterol    (60 - ) mg/dL


 


LDL/HDL Ratio     (<3.6)  


 


Cholesterol/HDL Ratio     (<5.0)  


 


Lipase    14 L  (22-51)  U/L


 


Urine Color  YELLOW    


 


Urine Clarity  CLEAR    (CLEAR)  


 


Urine pH  6.0    (5.0-7.5)  PH


 


Ur Specific Gravity  1.020    (1.002-1.030)  


 


Urine Protein  NEGATIVE    (NEGATIVE)  mg/dL


 


Urine Glucose (UA)  NEGATIVE    (NEGATIVE)  mg/dL


 


Urine Ketones  NEGATIVE    (NEGATIVE)  mg/dL


 


Urine Occult Blood  TRACE-INTA    (NEGATIVE)  


 


Urine Nitrite  NEGATIVE    (NEGATIVE)  


 


Urine Bilirubin  NEGATIVE    (NEGATIVE)  


 


Urine Urobilinogen  0.2 (NORMAL)    (NORMAL)  E.U./dL


 


Ur Leukocyte Esterase  SMALL H    (NEGATIVE)  


 


Urine RBC  0-5    (0-5)  /HPF


 


Urine WBC  >25 H    (0-3)  /HPF


 


Ur Squamous Epith Cells  FEW Squamous    (<= Few)  


 


Urine Bacteria  Moderate H    (None Seen)  /HPF


 


Ur Microscopic Review  INDICATED    


 


Urine Culture Comments  INDICATED    














  02/16/18 Range/Units





  18:40 


 


WBC  6.8  (4.8-10.8)  x10^3/uL


 


RBC  4.50 L  (4.70-6.10)  10^6/uL


 


Hgb  12.7 L  (14.0-18.0)  g/dL


 


Hct  38.4 L  (42.0-52.0)  %


 


MCV  85.3  (80.0-94.0)  fL


 


MCH  28.3  (27.0-31.0)  pg


 


MCHC  33.2  (32.0-36.0)  g/dL


 


RDW  13.6  (12.0-15.0)  %


 


Plt Count  287  (130-450)  10^3/uL


 


MPV  7.3 L  (7.4-11.4)  fL


 


Neut #  4.3  (1.5-6.6)  10^3/uL


 


Lymph #  1.5  (1.5-3.5)  10^3/uL


 


Mono #  0.7  (0.0-1.0)  10^3/uL


 


Eos #  0.2  (0.0-0.7)  10^3/uL


 


Baso #  0.1  (0.0-0.1)  10^3/uL


 


Absolute Nucleated RBC  0.00  x10^3/uL


 


Nucleated RBC %  0.0  /100WBC


 


PT   (9.9-12.6)  secs


 


INR   (0.8-1.2)  


 


Sodium   (135-145)  mmol/L


 


Potassium   (3.5-5.0)  mmol/L


 


Chloride   (101-111)  mmol/L


 


Carbon Dioxide   (21-32)  mmol/L


 


Anion Gap   (6-13)  


 


BUN   (6-20)  mg/dL


 


Creatinine   (0.6-1.2)  mg/dL


 


Estimated GFR (MDRD)   (>89)  


 


Glucose   ()  mg/dL


 


Calcium   (8.5-10.3)  mg/dL


 


Total Bilirubin   (0.2-1.0)  mg/dL


 


AST   (10-42)  IU/L


 


ALT   (10-60)  IU/L


 


Alkaline Phosphatase   ()  IU/L


 


Troponin I   (<0.49)  ng/mL


 


Total Protein   (6.7-8.2)  g/dL


 


Albumin   (3.2-5.5)  g/dL


 


Globulin   (2.1-4.2)  g/dL


 


Albumin/Globulin Ratio   (1.0-2.2)  


 


Triglycerides  ( - 149) mg/dL


 


Cholesterol  ( - 199) mg/dL


 


LDL Cholesterol, Calc  ( - 129) mg/dL


 


VLDL Cholesterol   mg/dL


 


HDL Cholesterol  (60 - ) mg/dL


 


LDL/HDL Ratio   (<3.6)  


 


Cholesterol/HDL Ratio   (<5.0)  


 


Lipase   (22-51)  U/L


 


Urine Color   


 


Urine Clarity   (CLEAR)  


 


Urine pH   (5.0-7.5)  PH


 


Ur Specific Gravity   (1.002-1.030)  


 


Urine Protein   (NEGATIVE)  mg/dL


 


Urine Glucose (UA)   (NEGATIVE)  mg/dL


 


Urine Ketones   (NEGATIVE)  mg/dL


 


Urine Occult Blood   (NEGATIVE)  


 


Urine Nitrite   (NEGATIVE)  


 


Urine Bilirubin   (NEGATIVE)  


 


Urine Urobilinogen   (NORMAL)  E.U./dL


 


Ur Leukocyte Esterase   (NEGATIVE)  


 


Urine RBC   (0-5)  /HPF


 


Urine WBC   (0-3)  /HPF


 


Ur Squamous Epith Cells   (<= Few)  


 


Urine Bacteria   (None Seen)  /HPF


 


Ur Microscopic Review   


 


Urine Culture Comments

## 2018-02-17 NOTE — MRI PRELIMINARY REPORT
Accession: [id number]

Exam: MRI BRAIN W/O

 

IMPRESSION: 

1. A 7 x 4 mm faint focus of suggested restricted diffusion within the left paramedian medulla (serie
s 505 image 40), May represent an acute infarct, less than one week old. 

 

2. Chronic lacunar infarcts within bilateral basal ganglia and left cerebellar hemisphere. 

 

3. Moderate T2/FLAIR hyperintense periventricular, deep, and subcortical white matter lesions within 
cerebral hemispheres bilaterally and within the solomon centrally. While nonspecific, these are favored 
to represent sequela of chronic microangiopathy.

 

4. There is a focus of susceptibility artifact within the left posterior frontal lobe (series 801 edwige
ge 18), likely representing remote microhemorrhage.

 

5. Moderate diffuse cerebral and cerebellar volume loss with exvacuo dilatation of the ventricles and
 sulci, appropriate for age.

 

The ordering provider was paged at the time of dictation.

 

RADI

 

SITE ID: [id number]

## 2018-02-17 NOTE — HISTORY & PHYSICAL EXAMINATION
DATE OF SERVICE: 02/16/2018

Physician: Alma Delia Antonio MD

 

HISTORY OF PRESENT ILLNESS:  This is an 88-year-old, white male, poor historian
, who is 

independent, lives at home.  The patient has a history of paroxysmal atrial 
fibrillation, 

abdominal aortic aneurysm repaired with a stent, hypothyroidism, BPH.  The 
patient has been 

treated for a UTI twice over the past 6 weeks, which recurred then 5 weeks ago.
  The symptoms 

then were of lightheadedness and he had presented to the emergency room and, 
after been found 

to have a UTI and received IV hydration, he was sent home, feeling better just 
with hydration. 

There was recurrence of this with another visit to the emergency room 5 weeks 
ago.  It is 

unclear who has now continued this patient's p.o. antibiotics that he is 
currently on for a 

UTI.

 

The patient presents with "dizziness, as if off balance."  This occurs when he 
tries to walk.  

There was no syncope.  He denies any falling.  He seems to be lilting to the 
rear end to one 

side when he tries to walk.  He states that this happened sometime in the past 
and possibly is 

referring to the 6 week ago ER visit.  The patient has never had a stroke.  He 
does not feel 

palpitations, chest pain or shortness of breath with this new symptom.  No 
orthostatic vital 

signs have been done yet.

 

PAST MEDICAL HISTORY:  Paroxysmal atrial fibrillation, not on Coumadin 
presumably due to his 

advanced age and these dizzy episodes giving him a risk of falls.  He has a 
history of 

abdominal aortic aneurysm repaired with a stent, BPH, hypothyroidism.

 

ALLERGIES:  NONE.

 

MEDICATIONS AT HOME

    1. Baby aspirin daily.

    2. Gabapentin 300 daily.

    3. Ipratropium bromide unknown frequency.

    4. Synthroid 137 mcg daily.

    5. Omeprazole 20 mg daily.

    6. Carafate 1 gram p.o. a.c. and at bedtime.

    7. Tamsulosin 0.4 mg daily.

    8. Zolpidem 10 mg at night.

    9. Keflex 500 mg q.i.d.

   10. Macrobid 100 mg b.i.d.

 

SOCIAL HISTORY:  The patient is a nonsmoker who never smoked, uses no alcohol 
or any illicit 

drugs.

 

FAMILY HISTORY:  No inherited diseases.

 

REVIEW OF SYSTEMS:  Comprehensive review of systems was done by asking the 
patient and 

reviewing all his old records and the positives are above.

 

PHYSICAL EXAMINATION

GENERAL:  Elderly, white male.  He is currently supine in bed, not in 
respiratory distress.

VITAL SIGNS:  Blood pressure 170/75, afebrile, heart rate 81 in sinus rhythm, 
respiratory rate 

20, room air saturation 99%.

HEENT:  Unremarkable.  His oral mucosa is moist.

NECK:  Shows no JVD in a supine position.  No carotid bruits, thyromegaly, or 
lymphadenopathy.

CHEST:  Clear.

HEART:  Heart sounds normal.

ABDOMEN:  Soft.

EXTREMITIES:  Without edema.

NEUROLOGIC:  Grossly intact in this position.

 

LABORATORIES:  Normal electrolytes, BUN 17, creatinine 1.5.  Troponins are not 
detectable.  

White blood count and differential are normal.  Hemoglobin 12.7 and platelet 
count normal.  No 

INR was done.  Urinalysis has small leukocyte esterase positive with moderate 
bacteria.

 

EKG:  Normal sinus rhythm with first degree AV block, poor R-wave progression, 
low voltage in 

the limb leads and relatively flat T waves in lateral leads.  

No chest x-ray was done.  

Head CT shows generalized age-related cortical atrophy and no acute 
intracranial abnormality.

 

IMPRESSION/DIAGNOSES

   1. Ataxia.

   2. Weakness (a lightheadedness feeling that is similar to when he required 
hydration during 

      his first urinary tract infection 6 weeks ago).

   3. Recurrent urinary tract infection 

   4. S/P abdominal aortic aneurysm repair with a graft.

   5. Paroxysmal atrial fibrillation.

 

PLAN:  Place the patient on telemetry, inpatient status.  Check postural vital 
signs.  Begin IV 

hydration, since there is suggestion of dehydration with a creatinine of 1.5.  
Cycle troponins.  

Obtain workup for TIA with carotid Doppler, brain MRI and an echo.  Recheck 

neuro status in the position where he gets symptoms: upright and with walking.  
Check lipids 

and treat per guidelines in a patient with known peripheral vascular disease (
AAA history).

 

CODE STATUS:  FULL CODE.

 

DEEP VENOUS THROMBOSIS PROPHYLAXIS:  SCDs.

 

ATTESTATION:  The patient is expected to be discharged or transferred to 
another facility 

within 96 hours:  Yes.

 

 

DD: 02/17/2018 03:12

TD: 02/17/2018 03:38

Job #: [id number]

[email or web address]

## 2018-02-17 NOTE — DISCHARGE PLAN
Discharge Plan


Disposition: 01 Home, Self Care


Condition: Good


Prescriptions: 


Atorvastatin Calcium 20 mg PO QPM #30 tablet


Levothyroxine Sodium [Synthroid] 100 mcg PO DAILY #30 tablet


Diet: Regular


Activity Restrictions: No Restrictions


Shower Restrictions: No


Driving Restrictions: No


Weight Bearing: Full Weight


Additional Instructions or Follow Up instructions: 


You were watched overnight after you came to the ED for dizziness.  Your 

symptoms were worrisome for TIA or stroke.  You underwent a full syncope work 

up. A head MRI was completed and showed a 7 x 4 mm faint focus within the left 

paramedian medulla, which may represent an acute infarct, less than one week 

old.  You passed a physical therapy evaluation and it was felt that you were at 

your baseline ambulation.  An echocardiogram was completed and results are 

pending.  





To help prevent further strokes, it is recommended that you take an anti-

cholesterol pill.  This medication was sent to your pharmacy.





Please rest if you are tired, and most of all enjoy the visit with your son 

tonight.





Please see your PCP in the week as a follow up to this hospital stay.








No Smoking: If you smoke, Please STOP!  Call [phone] for help.


Follow-up with: 


Cesario Jay DO [Primary Care Provider] -

## 2018-02-17 NOTE — ULTRASOUND PRELIMINARY REPORT
Accession: [id number]

Exam: US CAROTID DOPPLER COMPLETE

 

IMPRESSION: 

1. Right ICA: 50-69% proximal right ICA stenosis by PSV. 

2. Left ICA: No hemodynamically significant stenosis is identified.

 

Validated velocity measurements with angiographic measurements and velocity criteria are extrapolated
 from diameter data as defined by the Society of Radiologists in Ultrasound Consensus Conference Radi
ology 2003; 229;340-346.

 

[facility]

 

SITE ID: [id number]

## 2018-02-17 NOTE — MRI REPORT
EXAM:

MRI BRAIN WITHOUT CONTRAST

 

EXAM DATE: 2/17/2018 10:30 AM.

 

CLINICAL HISTORY: Poss cerebellar CVA.

 

COMPARISON: CT head 02/16/2018

 

TECHNIQUE: Multiplanar, multisequence T1-weighted and fluid-sensitive MR sequences of the brain were 
performed. Sequences optimized for routine evaluation. Other: None. IV Contrast: None.

 

FINDINGS:

Brain Volume: Moderate diffuse cerebral and cerebellar volume loss with exvacuo dilatation of the geoffrey
tricles and sulci, appropriate for age.

 

Parenchyma/Dura: There is a 7 x 4 mm faint focus of restricted diffusion within the left paramedian m
edulla (series 505 image 40), which may represent an acute infarct, less than one week old. No mass o
r acute hemorrhage. Chronic lacunar infarcts within bilateral basal ganglia and left cerebellar hemis
phere. Moderate T2/FLAIR hyperintense periventricular, deep, and subcortical white matter lesions wit
hin cerebral hemispheres bilaterally and within the solomon centrally. There is a focus of susceptibilit
y artifact within the left posterior frontal lobe (series 801 image 18), likely representing remote m
icrohemorrhage.

 

Ventricles/Cisterns: No hydrocephalus. No abnormal extra-axial fluid collection or hemorrhage.

 

Orbits: Status post bilateral lens replacement surgery. Otherwise unremarkable.

 

Sella Turcica: The pituitary gland, cavernous sinuses, suprasellar cistern and optic chiasm are unrem
arkable.

 

IAC: Symmetric and unremarkable.

 

Vasculature: Normal signal flow void is seen in the major arterial structures at the skull base.

 

Sinuses: No acute appearing sinus disease.

 

Bones: No focal pathologic appearing marrow signal changes.

 

Other: None.

 

IMPRESSION: 

1. A 7 x 4 mm faint focus of suggested restricted diffusion within the left paramedian medulla (serie
s 505 image 40), May represent an acute infarct, less than one week old. 

 

2. Chronic lacunar infarcts within bilateral basal ganglia and left cerebellar hemisphere. 

 

3. Moderate T2/FLAIR hyperintense periventricular, deep, and subcortical white matter lesions within 
cerebral hemispheres bilaterally and within the solomon centrally. While nonspecific, these are favored 
to represent sequela of chronic microangiopathy.

 

4. There is a focus of susceptibility artifact within the left posterior frontal lobe (series 801 edwige
ge 18), likely representing remote microhemorrhage.

 

5. Moderate diffuse cerebral and cerebellar volume loss with exvacuo dilatation of the ventricles and
 sulci, appropriate for age.

 

The ordering provider was paged at the time of dictation.

 

[facility]

Referring Provider Line: [phone]

 

SITE ID: [id number]

## 2018-02-17 NOTE — ULTRASOUND REPORT
EXAM:

CAROTID DOPPLER ULTRASOUND

 

EXAM DATE: 02/17/2018 08:40 AM.

 

CLINICAL HISTORY: CVA versus TIA.

 

COMPARISON: None.

 

TECHNIQUE: Real-time sonographic vascular imaging was performed by the sonographer through the caroti
d arterial system with a linear transducer utilizing color-flow, Doppler flow and spectral analysis. 
Multiple representative static images were saved for review.

 

FINDINGS: 

Right:

RCCA Prox: PSV 92 cm/sec.

RCCA Dist: PSV 89 cm/sec, EDV 17 cm/sec.

RECA: PSV 96 cm/sec.

R Bulb: PSV 82 cm/sec, EDV 13 cm/sec, ICA/CCA ratio 0.92.

MOHINDER Prox:  cm/sec, EDV 35 cm/sec, ICA/CCA ratio 1.57.

MOHINDER Mid:  cm/sec, EDV 34 cm/sec, ICA/CCA ratio 1.39.

MOHINDER Dist:  cm/sec, EDV 29  cm/sec, ICA/CCA ratio 1.33.

RVA: PSV 65 cm/sec. RVA flow direction: Antegrade.

 

Left:

LCCA Prox: PSV 89 cm/sec.

LCCA Dist: PSV 89 cm/sec, EDV 7 cm/sec.

LECA: PSV 84 cm/sec.

L Bulb: PSV 42 cm/sec, EDV 5 cm/sec, ICA/CCA ratio 0.47.

LICA Prox: PSV 79 cm/sec, EDV 19 cm/sec, ICA/CCA ratio 0.88.

LICA Mid: PSV 82 cm/sec, EDV 20 cm/sec, ICA/CCA ratio 0.92.

LICA Dist: PSV 79 cm/sec, EDV 20 cm/sec, ICA/CCA ratio 0.88.

LVA: PSV 20 cm/sec. LVA flow direction: Antegrade.

 

Mild-to-moderate atherosclerotic plaquing is present bilaterally, most pronounced at the carotid bulb
s.

 

Other: None.

 

IMPRESSION: 

1. Right ICA: 50-69% proximal right ICA stenosis by PSV. 

2. Left ICA: No hemodynamically significant stenosis is identified.

 

Validated velocity measurements with angiographic measurements and velocity criteria are extrapolated
 from diameter data as defined by the Society of Radiologists in Ultrasound Consensus Conference Radi
ology 2003; 229;340-346.

 

[facility]

Referring Provider Line: [phone]

 

SITE ID: [id number]

## 2018-03-22 ENCOUNTER — HOSPITAL ENCOUNTER (OUTPATIENT)
Dept: HOSPITAL 76 - EMS | Age: 83
End: 2018-03-22
Attending: SURGERY
Payer: MEDICARE

## 2018-03-22 DIAGNOSIS — R13.10: Primary | ICD-10-CM

## 2018-03-22 DIAGNOSIS — R10.30: ICD-10-CM

## 2018-06-10 ENCOUNTER — HOSPITAL ENCOUNTER (EMERGENCY)
Dept: HOSPITAL 76 - ED | Age: 83
Discharge: LEFT BEFORE DISCHARGE | End: 2018-06-10
Payer: MEDICARE

## 2018-06-10 VITALS — DIASTOLIC BLOOD PRESSURE: 75 MMHG | SYSTOLIC BLOOD PRESSURE: 164 MMHG

## 2018-06-10 DIAGNOSIS — Z79.82: ICD-10-CM

## 2018-06-10 DIAGNOSIS — I48.91: ICD-10-CM

## 2018-06-10 DIAGNOSIS — R10.84: ICD-10-CM

## 2018-06-10 DIAGNOSIS — R07.9: ICD-10-CM

## 2018-06-10 DIAGNOSIS — E03.9: ICD-10-CM

## 2018-06-10 DIAGNOSIS — N30.00: Primary | ICD-10-CM

## 2018-06-10 LAB
ALBUMIN DIAFP-MCNC: 4 G/DL (ref 3.2–5.5)
ALBUMIN/GLOB SERPL: 1.2 {RATIO} (ref 1–2.2)
ALP SERPL-CCNC: 85 IU/L (ref 42–121)
ALT SERPL W P-5'-P-CCNC: 15 IU/L (ref 10–60)
ANION GAP SERPL CALCULATED.4IONS-SCNC: 9 MMOL/L (ref 6–13)
AST SERPL W P-5'-P-CCNC: 20 IU/L (ref 10–42)
BASOPHILS NFR BLD AUTO: 0.1 10^3/UL (ref 0–0.1)
BASOPHILS NFR BLD AUTO: 0.7 %
BILIRUB BLD-MCNC: 0.8 MG/DL (ref 0.2–1)
BUN SERPL-MCNC: 20 MG/DL (ref 6–20)
CALCIUM UR-MCNC: 9.2 MG/DL (ref 8.5–10.3)
CHLORIDE SERPL-SCNC: 96 MMOL/L (ref 101–111)
CLARITY UR REFRACT.AUTO: (no result)
CO2 SERPL-SCNC: 23 MMOL/L (ref 21–32)
CREAT SERPLBLD-SCNC: 1.5 MG/DL (ref 0.6–1.2)
EOSINOPHIL # BLD AUTO: 0.2 10^3/UL (ref 0–0.7)
EOSINOPHIL NFR BLD AUTO: 1.9 %
ERYTHROCYTE [DISTWIDTH] IN BLOOD BY AUTOMATED COUNT: 14 % (ref 12–15)
GFRSERPLBLD MDRD-ARVRAT: 44 ML/MIN/{1.73_M2} (ref 89–?)
GLOBULIN SER-MCNC: 3.4 G/DL (ref 2.1–4.2)
GLUCOSE SERPL-MCNC: 95 MG/DL (ref 70–100)
GLUCOSE UR QL STRIP.AUTO: NEGATIVE MG/DL
HGB UR QL STRIP: 12.8 G/DL (ref 14–18)
KETONES UR QL STRIP.AUTO: (no result) MG/DL
LIPASE SERPL-CCNC: 30 U/L (ref 22–51)
LYMPHOCYTES # SPEC AUTO: 1.6 10^3/UL (ref 1.5–3.5)
LYMPHOCYTES NFR BLD AUTO: 18.1 %
MAGNESIUM SERPL-MCNC: 1.9 MG/DL (ref 1.7–2.8)
MCH RBC QN AUTO: 29.9 PG (ref 27–31)
MCHC RBC AUTO-ENTMCNC: 33.3 G/DL (ref 32–36)
MCV RBC AUTO: 89.7 FL (ref 80–94)
MONOCYTES # BLD AUTO: 1 10^3/UL (ref 0–1)
MONOCYTES NFR BLD AUTO: 10.9 %
NEUTROPHILS # BLD AUTO: 6.1 10^3/UL (ref 1.5–6.6)
NEUTROPHILS # SNV AUTO: 9 X10^3/UL (ref 4.8–10.8)
NEUTROPHILS NFR BLD AUTO: 68.4 %
NITRITE UR QL STRIP.AUTO: NEGATIVE
PDW BLD AUTO: 7 FL (ref 7.4–11.4)
PH UR STRIP.AUTO: 7 PH (ref 5–7.5)
PHOSPHATE BLD-MCNC: 2.4 MG/DL (ref 2.5–4.6)
PLATELET # BLD: 235 10^3/UL (ref 130–450)
PROT SPEC-MCNC: 7.4 G/DL (ref 6.7–8.2)
PROT UR STRIP.AUTO-MCNC: NEGATIVE MG/DL
RBC # UR STRIP.AUTO: (no result) /UL
RBC # URNS HPF: (no result) /HPF (ref 0–5)
RBC MAR: 4.27 10^6/UL (ref 4.7–6.1)
SODIUM SERPLBLD-SCNC: 128 MMOL/L (ref 135–145)
SP GR UR STRIP.AUTO: <=1.005 (ref 1–1.03)
SQUAMOUS URNS QL MICRO: (no result)
UROBILINOGEN UR QL STRIP.AUTO: (no result) E.U./DL
UROBILINOGEN UR STRIP.AUTO-MCNC: NEGATIVE MG/DL

## 2018-06-10 PROCEDURE — 74174 CTA ABD&PLVS W/CONTRAST: CPT

## 2018-06-10 PROCEDURE — 99283 EMERGENCY DEPT VISIT LOW MDM: CPT

## 2018-06-10 PROCEDURE — 83735 ASSAY OF MAGNESIUM: CPT

## 2018-06-10 PROCEDURE — 96374 THER/PROPH/DIAG INJ IV PUSH: CPT

## 2018-06-10 PROCEDURE — 81001 URINALYSIS AUTO W/SCOPE: CPT

## 2018-06-10 PROCEDURE — 96361 HYDRATE IV INFUSION ADD-ON: CPT

## 2018-06-10 PROCEDURE — 87086 URINE CULTURE/COLONY COUNT: CPT

## 2018-06-10 PROCEDURE — 84484 ASSAY OF TROPONIN QUANT: CPT

## 2018-06-10 PROCEDURE — 71045 X-RAY EXAM CHEST 1 VIEW: CPT

## 2018-06-10 PROCEDURE — 81003 URINALYSIS AUTO W/O SCOPE: CPT

## 2018-06-10 PROCEDURE — 85025 COMPLETE CBC W/AUTO DIFF WBC: CPT

## 2018-06-10 PROCEDURE — 83690 ASSAY OF LIPASE: CPT

## 2018-06-10 PROCEDURE — 84100 ASSAY OF PHOSPHORUS: CPT

## 2018-06-10 PROCEDURE — 36415 COLL VENOUS BLD VENIPUNCTURE: CPT

## 2018-06-10 PROCEDURE — 80053 COMPREHEN METABOLIC PANEL: CPT

## 2018-06-10 PROCEDURE — 93005 ELECTROCARDIOGRAM TRACING: CPT

## 2018-06-10 NOTE — ED PHYSICIAN DOCUMENTATION
PD HPI ABD PAIN





- Stated complaint


Stated Complaint: ABD PX





- Chief complaint


Chief Complaint: Abd Pain





- History obtained from


History obtained from: Patient, Family





- History of Present Illness


Timing - onset: Today


Timing - duration: Days (1)


Timing - details: Gradual onset, Waxing and waning


Pain level max: 8


Pain level now: 2


Quality: Aching, Sharp, Pain


Location: All over / everywhere


Improved by: Other (nothing)


Worsened by: Other (nothing)


Associated symptoms: No: Fever, Nausea, Vomiting, Hematemesis, Diarrhea, 

Constipation, Melena


Similar symptoms before: Diagnosis (states has happened before, unknown if a 

cause was found.)


Recently seen: Not recently seen





Review of Systems


Ten Systems: 10 systems reviewed and negative


Constitutional: denies: Fever, Chills


Ears: denies: Ear pain


Nose: denies: Rhinorrhea / runny nose, Congestion


Throat: denies: Sore throat


Cardiac: denies: Chest pain / pressure, Palpitations


Respiratory: denies: Dyspnea, Cough


GI: denies: Vomiting, Diarrhea, Hematemesis, Bloody / black stool


Skin: denies: Rash


Musculoskeletal: denies: Neck pain, Back pain


Neurologic: denies: Headache





PD PAST MEDICAL HISTORY





- Past Medical History


Past Medical History: Yes


Cardiovascular: Atrial fibrillation


Respiratory: None


Endocrine/Autoimmune: HyPOthyroidism


: Benign prostate hypertrophy


HEENT: Chronic hearing loss


Psych: None


Musculoskeletal: None


Derm: None





- Past Surgical History


Past Surgical History: Yes





- Present Medications


Home Medications: 


 Ambulatory Orders











 Medication  Instructions  Recorded  Confirmed


 


Aspirin 81 mg DAILY 07/16/17 02/17/18


 


Tamsulosin HCl 0.4 mg PO DAILY 07/16/17 02/17/18


 


Zolpidem Tartrate 10 mg PO QPM PRN 07/16/17 02/17/18


 


Atorvastatin Calcium 20 mg PO QPM #30 tablet 02/17/18 


 


Latanoprost 0.005% Ophth Drops 1 drops EACHEYE QPM 02/17/18 02/17/18





[Xalatan Ophth Drops]   


 


Levothyroxine Sodium [Synthroid] 100 mcg PO DAILY #30 tablet 02/17/18 


 


Pilocarpine HCl [Salagen] 5 mg PO TID PRN 02/17/18 02/17/18


 


Timolol 0.5% Ophth Drops [Timoptic 1 drops EACHEYE DAILY 02/17/18 02/17/18





0.5% Ophth Drops]   


 


raNITIdine [Zantac] 150 mg PO DAILY 02/17/18 02/17/18


 


Atorvastatin Calcium 20 mg PO QPM #30 tablet 02/19/18 


 


Levothyroxine Sodium [Synthroid] 100 mcg PO DAILY #30 tablet 02/19/18 


 


Cephalexin [Keflex] 500 mg PO Q6H #28 capsule 06/10/18 














- Allergies


Allergies/Adverse Reactions: 


 Allergies











Allergy/AdvReac Type Severity Reaction Status Date / Time


 


No Known Drug Allergies Allergy   Verified 02/16/18 16:59














- Social History


Does the pt smoke?: No


Smoking Status: Never smoker


Does the pt drink ETOH?: No


Does the pt have substance abuse?: No





- Immunizations


Immunizations are current?: Yes





- POLST


Patient has POLST: No





PD ED PE NORMAL





- Vitals


Vital signs reviewed: Yes





- General


General: Alert and oriented X 3, No acute distress, Well developed/nourished





- HEENT


HEENT: PERRL, Moist mucous membranes





- Neck


Neck: Supple, no meningeal sign





- Cardiac


Cardiac: RRR, Strong equal pulses





- Respiratory


Respiratory: No respiratory distress, Clear bilaterally





- Abdomen


Abdomen: Soft, Non tender, Non distended





- Back


Back: No CVA TTP, No spinal TTP





- Derm


Derm: Warm and dry, No rash





- Extremities


Extremities: No edema, No calf tenderness / cord





- Neuro


Neuro: Alert and oriented X 3





- Psych


Psych: Normal mood, Normal affect





Results





- Vitals


Vitals: 


 Vital Signs - 24 hr











  06/10/18 06/10/18 06/10/18





  16:53 18:09 20:09


 


Temperature 36.3 C L  


 


Heart Rate 64 77 76


 


Respiratory 24 16 22





Rate   


 


Blood Pressure 147/44 H 158/96 H 173/93 H


 


O2 Saturation 100 100 99














  06/10/18 06/10/18





  21:06 21:28


 


Temperature 36.2 C L 


 


Heart Rate 82 83


 


Respiratory 15 18





Rate  


 


Blood Pressure 164/75 H 164/75 H


 


O2 Saturation 100 99








 Oxygen











O2 Source [With Activity]      Room air


 


O2 Source                      Room air

















- EKG (time done)


  ** 1838


Rate: Rate (enter#) (89)


Rhythm: NSR, Other (PVC's)


Axis: Normal


Intervals: Prolonged NC


QRS: Normal


Ischemia: ST depression (V5-6)





- Labs


Labs: 


 Laboratory Tests











  06/10/18 06/10/18 06/10/18





  17:14 17:14 18:58


 


WBC  9.0  


 


RBC  4.27 L  


 


Hgb  12.8 L  


 


Hct  38.3 L  


 


MCV  89.7  


 


MCH  29.9  


 


MCHC  33.3  


 


RDW  14.0  


 


Plt Count  235  


 


MPV  7.0 L  


 


Neut # (Auto)  6.1  


 


Lymph # (Auto)  1.6  


 


Mono # (Auto)  1.0  


 


Eos # (Auto)  0.2  


 


Baso # (Auto)  0.1  


 


Absolute Nucleated RBC  0.00  


 


Nucleated RBC %  0.0  


 


Sodium   128 L 


 


Potassium   4.2 


 


Chloride   96 L 


 


Carbon Dioxide   23 


 


Anion Gap   9.0 


 


BUN   20 


 


Creatinine   1.5 H 


 


Estimated GFR (MDRD)   44 L 


 


Glucose   95 


 


Calcium   9.2 


 


Phosphorus    2.4 L


 


Magnesium    1.9


 


Total Bilirubin   0.8 


 


AST   20 


 


ALT   15 


 


Alkaline Phosphatase   85 


 


Troponin I   


 


Total Protein   7.4 


 


Albumin   4.0 


 


Globulin   3.4 


 


Albumin/Globulin Ratio   1.2 


 


Lipase   30 


 


Urine Color   


 


Urine Clarity   


 


Urine pH   


 


Ur Specific Gravity   


 


Urine Protein   


 


Urine Glucose (UA)   


 


Urine Ketones   


 


Urine Occult Blood   


 


Urine Nitrite   


 


Urine Bilirubin   


 


Urine Urobilinogen   


 


Ur Leukocyte Esterase   


 


Urine RBC   


 


Urine WBC   


 


Ur Squamous Epith Cells   


 


Urine Bacteria   


 


Ur Microscopic Review   


 


Urine Culture Comments   














  06/10/18 06/10/18





  18:58 19:44


 


WBC  


 


RBC  


 


Hgb  


 


Hct  


 


MCV  


 


MCH  


 


MCHC  


 


RDW  


 


Plt Count  


 


MPV  


 


Neut # (Auto)  


 


Lymph # (Auto)  


 


Mono # (Auto)  


 


Eos # (Auto)  


 


Baso # (Auto)  


 


Absolute Nucleated RBC  


 


Nucleated RBC %  


 


Sodium  


 


Potassium  


 


Chloride  


 


Carbon Dioxide  


 


Anion Gap  


 


BUN  


 


Creatinine  


 


Estimated GFR (MDRD)  


 


Glucose  


 


Calcium  


 


Phosphorus  


 


Magnesium  


 


Total Bilirubin  


 


AST  


 


ALT  


 


Alkaline Phosphatase  


 


Troponin I  0.07 


 


Total Protein  


 


Albumin  


 


Globulin  


 


Albumin/Globulin Ratio  


 


Lipase  


 


Urine Color   YELLOW


 


Urine Clarity   HAZY


 


Urine pH   7.0


 


Ur Specific Gravity   <=1.005


 


Urine Protein   NEGATIVE


 


Urine Glucose (UA)   NEGATIVE


 


Urine Ketones   TRACE


 


Urine Occult Blood   TRACE-INTA


 


Urine Nitrite   NEGATIVE


 


Urine Bilirubin   NEGATIVE


 


Urine Urobilinogen   0.2 (NORMAL)


 


Ur Leukocyte Esterase   MODERATE H


 


Urine RBC   0-5


 


Urine WBC   11-25 H


 


Ur Squamous Epith Cells   NONE SEEN


 


Urine Bacteria   Moderate H


 


Ur Microscopic Review   INDICATED


 


Urine Culture Comments   INDICATED














- Rads (name of study)


  ** cxr


Radiology: Prelim report reviewed, EMP read contemporaneously, See rad report (

No acute cardiopulmonary process. )





  ** abd/pelvis angio


Radiology: Prelim report reviewed, EMP read contemporaneously, See rad report (

Stable appearance post endovascular aortobiiliac aneurysm repair. Maximum 

aneurysm sac diameter 4.0 cm, unchanged. 2. High-grade stenosis of the celiac 

artery, SMA, and left renal artery origins and diminutive appearance of the RANDY 

and right renal arteries, as before. 3. The left internal iliac artery is 

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 4. Congenital 

bowel malrotation. 5. Distal colonic diverticulosis without CT evidence for 

acute diverticulitis. )





PD MEDICAL DECISION MAKING





- ED course


Complexity details: reviewed old records, reviewed results, re-evaluated patient

, considered differential, d/w patient, d/w family


ED course: 





Patient is an 88-year-old male who presents to the emergency department with 

vague abdominal pain today.  He does have a history of a AAA and so a CT 

angiogram of the abdomen and pelvis was performed.  This does not show any 

acute changes.  He does have high-grade stenosis of the SMA and his symptoms 

might be caused by mesenteric ischemia if he becomes dehydrated from not 

eating.  He felt better after IV fluids.  When he returned from CAT scan and 

was in the emergency department, he developed left-sided chest pain that was 

dull and aching.  Nonradiating.  This resolved just prior to him receiving 

nitroglycerin.  His troponin is mildly increased at 0.07, though this is not a 

positive finding for this hospital.  I did explain to both the patient and his 

wife that I was concerned he may be having a heart attack given the ST 

depression on his EKG along with the troponin level.  I recommended that they 

at least let us keep him in the hospital here for serial troponins.  Patient 

and wife are adamant that they do not want him to stay in the hospital and that 

he would like to go home.  I informed them of the risks of doing so including a 

large heart attack, arrhythmia and death.  They state that they understand 

these risks and are comfortable with them.  They still do not want to stay in 

the hospital or be transferred.  I informed them that they are welcome to 

return at any time.  He was also found to have a urinary tract infection and 

was given Rocephin.  Will prescribe antibiotics for home for this as well.  

Patient signed AGAINST MEDICAL ADVICE.  Encouraged the patient and his wife to 

follow-up with his doctor in the morning and to please return to the emergency 

department should he change his mind.





This document was made in part using voice recognition software. While efforts 

are made to proofread this document, sound alike and grammatical errors may 

occur.





- Sepsis Event


Vital Signs: 


 Vital Signs - 24 hr











  06/10/18 06/10/18 06/10/18





  16:53 18:09 20:09


 


Temperature 36.3 C L  


 


Heart Rate 64 77 76


 


Respiratory 24 16 22





Rate   


 


Blood Pressure 147/44 H 158/96 H 173/93 H


 


O2 Saturation 100 100 99














  06/10/18 06/10/18





  21:06 21:28


 


Temperature 36.2 C L 


 


Heart Rate 82 83


 


Respiratory 15 18





Rate  


 


Blood Pressure 164/75 H 164/75 H


 


O2 Saturation 100 99








 Oxygen











O2 Source [With Activity]      Room air


 


O2 Source                      Room air

















Departure





- Departure


Disposition: 07 Against Medical Advice


Clinical Impression: 


UTI (urinary tract infection)


Qualifiers:


 Urinary tract infection type: acute cystitis Hematuria presence: without 

hematuria Qualified Code(s): N30.00 - Acute cystitis without hematuria





Chest pain


Qualifiers:


 Chest pain type: unspecified Qualified Code(s): R07.9 - Chest pain, unspecified





Abdominal pain


Qualifiers:


 Abdominal location: generalized Qualified Code(s): R10.84 - Generalized 

abdominal pain





Condition: Stable


Instructions:  ED UTI Cystitis Male


Follow-Up: 


Cesario Jay DO [Primary Care Provider] - Tomorrow


Prescriptions: 


Cephalexin [Keflex] 500 mg PO Q6H #28 capsule


Comments: 


You have chosen to leave against medical advice tonight. You are aware that you 

may be having a heart attack and should be treated in the hospital. You and 

your wife both state understanding that this may kill you as soon as tonight or 

longer. This may also leave you permanently disabled. You are welcome to return 

at any time should you change your mind about being evaluated and treated.  You 

need to see your doctor in the morning.  Take all antibiotics until gone. 


Discharge Date/Time: 06/10/18 21:00

## 2018-06-10 NOTE — XRAY PRELIMINARY REPORT
Accession: [id number]

Exam: XR CHEST 1 VIEW X-RAY

 

IMPRESSION: No acute cardiopulmonary process.

 

[facility]

 

SITE ID: [id number]

## 2018-06-10 NOTE — CT REPORT
EXAM:

CT ANGIOGRAM ABDOMEN AND PELVIS WITH CONTRAST

 

EXAM DATE: 6/10/2018 06:37 PM.

 

CLINICAL HISTORY: Abdominal pain. History of AAA.

 

COMPARISONS: CT abdomen/pelvis 07/16/2017.

 

TECHNIQUE: Routine helical CT angiogram imaging was performed through the abdomen and pelvis in the a
rterial phase. IV contrast: 80 mL Isovue-300. Enteric contrast: None. Reconstructions: Coronal, sagit
jeromy, and 3D MIP reconstructions.

 

In accordance with CT protocol optimization, one or more of the following dose reduction techniques w
ere utilized for this exam: automated exposure control, adjustment of mA and/or KV based on patient s
ize, or use of iterative reconstructive technique.

 

FINDINGS: 

Vasculature: Postoperative changes of endovascular aneurysm repair with stent material extending from
 the suprarenal abdominal aorta through the bilateral common iliac arteries. Maximum aneurysm sac eddie
meter 4.0 cm, unchanged. 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
d left renal artery origins. Diminutive, patent right renal artery with moderate 5075% stenosis of th
e origin. Diminutive, patent RANDY. 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
titutes distally, as before. Unchanged approximately 50% stenosis of the right internal iliac artery 
origin.

 

Lung Bases: Bibasilar subpleural reticular opacities, likely representing senescent fibrosis.

 

Abdominal Solid Organs: Unremarkable. Unremarkable arterial phase appearance of the liver, pancreas, 
spleen, adrenal glands, and kidneys.

 

Gallbladder and Bile Ducts: Unremarkable. No visualized stones or biliary ductal dilatation. 

 

Peritoneal Cavity/GI Tract: Congenital bowel malrotation. A prominent gas-containing diverticulum pro
jects superiorly from the mid duodenum. Descending and sigmoid colon diverticulosis. No focal wall th
ickening or adjacent mesenteric fat stranding to suggest acute diverticulitis. No evidence for bowel 
obstruction. The pancreas normal. No free fluid, pneumoperitoneum, or adenopathy.

 

Pelvic Organs: Partially obscured by streak artifact from left hip prosthesis. The prostate gland is 
enlarged, measuring 5.2 cm in transverse dimension, and contains dystrophic calcifications. The bladd
er is within normal limits as visualized.

 

Bones: Osteopenic. Left total hip arthroplasty. Mild levoscoliosis centered at L4-L5. Moderate to sev
ere multilevel degenerative disk disease throughout the lumbar spine. No acute bony abnormality.

 

Other: None.

 

IMPRESSION: 

1. Stable appearance post endovascular aortobiiliac aneurysm repair. Maximum aneurysm sac diameter 4.
0 cm, unchanged. 

2. High-grade stenosis of the celiac artery, SMA, and left renal artery origins and diminutive appear
ance of the RANDY and right renal arteries, as before. 

3. 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 

4. Congenital bowel malrotation. 

5. Distal colonic diverticulosis without CT evidence for acute diverticulitis.

 

[facility]

Referring Provider Line: [phone]

 

SITE ID: [id number]

## 2018-06-10 NOTE — XRAY REPORT
EXAM: 

CHEST RADIOGRAPHY

 

EXAM DATE: 6/10/2018 06:54 PM.

 

CLINICAL HISTORY: Chest pain.

 

COMPARISON: None.

 

TECHNIQUE: 1 view.

 

FINDINGS:

Lungs/Pleura: Reticular opacities at the lung bases likely secondary to mild fibrosis. There is no co
ntour lung consolidation. No pleural effusion or pneumothorax.

 

Mediastinum: Within exam limitations, the cardiomediastinal contour is normal.

 

Other: None.

 

IMPRESSION: No acute cardiopulmonary process.

 

[facility]

Referring Provider Line: [phone]

 

SITE ID: [id number]

## 2018-06-10 NOTE — CT PRELIMINARY REPORT
Accession: [id number]

Exam: CT ABDOMEN/PELVIS ANGIO

 

IMPRESSION: 

1. Stable appearance post endovascular aortobiiliac aneurysm repair. Maximum aneurysm sac diameter 4.
0 cm, unchanged. 

2. High-grade stenosis of the celiac artery, SMA, and left renal artery origins and diminutive appear
ance of the RANDY and right renal arteries, as before. 

3. 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 

4. Congenital bowel malrotation. 

5. Distal colonic diverticulosis without CT evidence for acute diverticulitis.

 

[facility]

 

SITE ID: [id number]

## 2018-06-25 ENCOUNTER — HOSPITAL ENCOUNTER (EMERGENCY)
Dept: HOSPITAL 76 - ED | Age: 83
Discharge: HOME | End: 2018-06-25
Payer: MEDICARE

## 2018-06-25 VITALS — SYSTOLIC BLOOD PRESSURE: 138 MMHG | DIASTOLIC BLOOD PRESSURE: 103 MMHG

## 2018-06-25 DIAGNOSIS — E03.9: ICD-10-CM

## 2018-06-25 DIAGNOSIS — M54.5: ICD-10-CM

## 2018-06-25 DIAGNOSIS — R94.31: ICD-10-CM

## 2018-06-25 DIAGNOSIS — R10.84: Primary | ICD-10-CM

## 2018-06-25 DIAGNOSIS — R03.0: ICD-10-CM

## 2018-06-25 DIAGNOSIS — Z79.82: ICD-10-CM

## 2018-06-25 LAB
ALBUMIN DIAFP-MCNC: 3.9 G/DL (ref 3.2–5.5)
ALBUMIN/GLOB SERPL: 1.3 {RATIO} (ref 1–2.2)
ALP SERPL-CCNC: 92 IU/L (ref 42–121)
ALT SERPL W P-5'-P-CCNC: 14 IU/L (ref 10–60)
ANION GAP SERPL CALCULATED.4IONS-SCNC: 11 MMOL/L (ref 6–13)
AST SERPL W P-5'-P-CCNC: 22 IU/L (ref 10–42)
BASOPHILS NFR BLD AUTO: 0.1 10^3/UL (ref 0–0.1)
BASOPHILS NFR BLD AUTO: 0.8 %
BILIRUB BLD-MCNC: 1 MG/DL (ref 0.2–1)
BUN SERPL-MCNC: 18 MG/DL (ref 6–20)
CALCIUM UR-MCNC: 9 MG/DL (ref 8.5–10.3)
CHLORIDE SERPL-SCNC: 93 MMOL/L (ref 101–111)
CO2 SERPL-SCNC: 23 MMOL/L (ref 21–32)
CREAT SERPLBLD-SCNC: 1.5 MG/DL (ref 0.6–1.2)
EOSINOPHIL # BLD AUTO: 0.1 10^3/UL (ref 0–0.7)
EOSINOPHIL NFR BLD AUTO: 1.2 %
ERYTHROCYTE [DISTWIDTH] IN BLOOD BY AUTOMATED COUNT: 13.6 % (ref 12–15)
GFRSERPLBLD MDRD-ARVRAT: 44 ML/MIN/{1.73_M2} (ref 89–?)
GLOBULIN SER-MCNC: 3.1 G/DL (ref 2.1–4.2)
GLUCOSE SERPL-MCNC: 89 MG/DL (ref 70–100)
HGB UR QL STRIP: 13.4 G/DL (ref 14–18)
LIPASE SERPL-CCNC: 30 U/L (ref 22–51)
LYMPHOCYTES # SPEC AUTO: 1.6 10^3/UL (ref 1.5–3.5)
LYMPHOCYTES NFR BLD AUTO: 16.6 %
MCH RBC QN AUTO: 29.8 PG (ref 27–31)
MCHC RBC AUTO-ENTMCNC: 32.9 G/DL (ref 32–36)
MCV RBC AUTO: 90.7 FL (ref 80–94)
MONOCYTES # BLD AUTO: 1 10^3/UL (ref 0–1)
MONOCYTES NFR BLD AUTO: 10.3 %
NEUTROPHILS # BLD AUTO: 6.8 10^3/UL (ref 1.5–6.6)
NEUTROPHILS # SNV AUTO: 9.5 X10^3/UL (ref 4.8–10.8)
NEUTROPHILS NFR BLD AUTO: 71.1 %
PDW BLD AUTO: 7.1 FL (ref 7.4–11.4)
PLATELET # BLD: 235 10^3/UL (ref 130–450)
PROT SPEC-MCNC: 7 G/DL (ref 6.7–8.2)
RBC MAR: 4.48 10^6/UL (ref 4.7–6.1)
SODIUM SERPLBLD-SCNC: 127 MMOL/L (ref 135–145)

## 2018-06-25 PROCEDURE — 72131 CT LUMBAR SPINE W/O DYE: CPT

## 2018-06-25 PROCEDURE — 73522 X-RAY EXAM HIPS BI 3-4 VIEWS: CPT

## 2018-06-25 PROCEDURE — 99283 EMERGENCY DEPT VISIT LOW MDM: CPT

## 2018-06-25 PROCEDURE — 83690 ASSAY OF LIPASE: CPT

## 2018-06-25 PROCEDURE — 74177 CT ABD & PELVIS W/CONTRAST: CPT

## 2018-06-25 PROCEDURE — 80053 COMPREHEN METABOLIC PANEL: CPT

## 2018-06-25 PROCEDURE — 85025 COMPLETE CBC W/AUTO DIFF WBC: CPT

## 2018-06-25 PROCEDURE — 84484 ASSAY OF TROPONIN QUANT: CPT

## 2018-06-25 PROCEDURE — 93005 ELECTROCARDIOGRAM TRACING: CPT

## 2018-06-25 PROCEDURE — 96374 THER/PROPH/DIAG INJ IV PUSH: CPT

## 2018-06-25 PROCEDURE — 36415 COLL VENOUS BLD VENIPUNCTURE: CPT

## 2018-06-25 NOTE — CT REPORT
Procedure Date:  06/25/2018   

Accession Number:  [id number] / [id number]                    

Procedure:  CT  - Lumbar Spine W/O CPT Code:  

 

FULL RESULT:

 

 

EXAM:

CT LUMBAR SPINE WITHOUT CONTRAST

 

EXAM DATE: 6/25/2018 04:45 PM.

 

CLINICAL HISTORY: Back injury. Back pain.

 

COMPARISONS: Abdomen/pelvis angio 06/10/2018.

 

TECHNIQUE: Thin-section axial images were acquired of the lumbar spine 

from T12 to S1 without contrast. Post-processing: Coronal and sagittal 

reformats. Other: None.

 

In accordance with CT protocol optimization, one or more of the following 

dose reduction techniques were utilized for this exam: automated exposure 

control, adjustment of mA and/or KV based on patient size, or use of 

iterative reconstructive technique.

 

FINDINGS:

Alignment: 14.1 degrees dextroscoliosis between mid L1 and L3-L4.

14.5 degrees of levoscoliosis between L3-L4 and L5-S1.

No significant listhesis.

 

Bones: No erosive or destructive changes. No fractures or bone lesions.

 

Disk Levels/Facets:

T12-L1: Mild broad-based disk bulge. No stenosis.

 

L1-L2: Disk osteophyte complex. Prominent facets and ligamentum flavum. 

Moderate central stenosis. Severe left and mild right foraminal stenosis.

 

L2-L3: Broad-based disk bulge is seen. Prominent facets. Hypertrophic 

ligamentum flavum. Moderate central stenosis. Moderate to severe left 

foraminal stenosis. Right neural foramina is normal.

 

L3-L4: Disk space height loss, marked arthrosis, hypertrophic facets and 

ligament flavum. Severe central stenosis. Severe bilateral foraminal 

stenosis.

 

L4-L5: Disk space height loss, hypertrophic facets and ligamentum flavum. 

Moderately severe central stenosis. Severe right and moderate to severe 

left foraminal stenosis.

 

L5-S1: Vacuum disk phenomenon, hypertrophic facets and ligamentum flavum. 

No central stenosis. Severe bilateral foraminal stenosis.

 

Musculature: Moderate fatty atrophy of the multifidus muscle is seen.

 

Other: Previous aortic stent graft.

 

IMPRESSION:

1. 14.1 degrees dextroscoliosis between mid L1 and L3-L4. 14.5 degrees 

levoscoliosis between L3-L4 and L5-S1. No significant listhesis. Moderate 

fatty atrophy of the multifidus muscle is seen.

2. L1-L2 shows disk osteophyte complex, moderate central stenosis. Severe 

left and mild right foraminal stenosis.

3. L2-L3 shows a broad-based disk bulge and prominent facets. Moderate 

central stenosis. Moderate to severe left foraminal stenosis. Right 

neuroforamina is normal.

4. L3-L4 shows disk space height loss and marked arthrosis, hypertrophic 

facets and ligament flavum. Severe central stenosis. Severe bilateral 

foraminal stenosis.

5. L4-L5 shows disk space height loss and hypertrophic facets. Moderate 

to severe central stenosis. Severe right and moderate to severe left 

foraminal stenosis.

6. L5-S1 shows vacuum disk phenomenon, hypertrophic facets and ligamentum 

flavum. No central stenosis. Severe bilateral foraminal stenosis.

 

[facility]

ADDENDUM: 07/06/18 21:50

 

Further review of this examination shows nondisplaced transverse process 

fractures of the left at L1, L2 and L3.

## 2018-06-25 NOTE — ED PHYSICIAN DOCUMENTATION
History of Present Illness





- Stated complaint


Stated Complaint: UNABLE TO WALK





- Chief complaint


Chief Complaint: Ext Problem





- History obtained from


History obtained from: Patient, Family (wife)





- History of Present Illness


Timing: Other (This is an 88-year-old gentleman who fell 2 days ago in his 

bathroom hitting his back on a stool.  He complains of pain across the low back 

and hips and is unable to walk because of it.  He is taking Tylenol and some 

sort of pain pill for but it is over-the-counter he does not know what it is 

besides the Tylenol.  There were no other injuries and he specifically denies 

headache, head injury, chest pain or trouble breathing.  Note made that he was 

seen here 2 weeks ago for abdominal pain, he did have chronic vascular disease 

and a AAA repair in the belly and had an indeterminate troponin and signed out 

AGAINST MEDICAL ADVICE.)





Review of Systems


Ten Systems: 10 systems reviewed and negative


Constitutional: denies: Fever, Chills


Cardiac: denies: Chest pain / pressure, Palpitations


Respiratory: denies: Dyspnea, Cough, Wheezing


GI: reports: Abdominal Pain.  denies: Nausea, Vomiting, Diarrhea





PD PAST MEDICAL HISTORY





- Past Medical History


Cardiovascular: Atrial fibrillation


Respiratory: None


Endocrine/Autoimmune: HyPOthyroidism


: Benign prostate hypertrophy


HEENT: Chronic hearing loss


Psych: None


Musculoskeletal: None


Derm: None





- Past Surgical History


Past Surgical History: Yes





- Present Medications


Home Medications: 


 Ambulatory Orders











 Medication  Instructions  Recorded  Confirmed


 


Aspirin 81 mg DAILY 07/16/17 02/17/18


 


Tamsulosin HCl 0.4 mg PO DAILY 07/16/17 02/17/18


 


Zolpidem Tartrate 10 mg PO QPM PRN 07/16/17 02/17/18


 


Latanoprost 0.005% Ophth Drops 1 drops EACHEYE QPM 02/17/18 02/17/18





[Xalatan Ophth Drops]   


 


Levothyroxine Sodium [Synthroid] 100 mcg PO DAILY #30 tablet 02/17/18 


 


Timolol 0.5% Ophth Drops [Timoptic 1 drops EACHEYE DAILY 02/17/18 02/17/18





0.5% Ophth Drops]   


 


Levothyroxine Sodium [Synthroid] 100 mcg PO DAILY #30 tablet 02/19/18 


 


Dorzolamide HCl  06/25/18 


 


Doxazosin [Cardura]  06/25/18 


 


Gabapentin 600 mg PO 06/25/18 


 


Ipratropium Bromide  06/25/18 


 


Polyethylene Glycol 3350 [Miralax] 17 gm PO DAILY PRN #1 bottle 06/25/18 














- Allergies


Allergies/Adverse Reactions: 


 Allergies











Allergy/AdvReac Type Severity Reaction Status Date / Time


 


No Known Drug Allergies Allergy   Verified 06/25/18 13:48














- Living Situation


Living Situation: reports: With spouse/s.o.





- Social History


Does the pt smoke?: No


Smoking Status: Never smoker


Does the pt drink ETOH?: No


Does the pt have substance abuse?: No





- Family History


Family history: reports: Non contributory





- Immunizations


Immunizations are current?: Yes





- POLST


Patient has POLST: No





PD ED PE NORMAL





- Vitals


Vital signs reviewed: Yes





- General


General: Alert and oriented X 3, No acute distress





- HEENT


HEENT: PERRL, EOMI





- Neck


Neck: Supple, no meningeal sign, No bony TTP





- Cardiac


Cardiac: RRR, No murmur





- Respiratory


Respiratory: No respiratory distress, Clear bilaterally





- Abdomen


Abdomen: Other (Soft with mild diffuse tenderness, normal bowel tones.)





- Back


Back: Other (He has a 3 cm bruise overlying L2-L3 in the midline with focal 

tenderness there.)





- Derm


Derm: Normal color, Warm and dry





- Extremities


Extremities: No deformity, No tenderness to palpate, Normal ROM s pain, No edema

, No calf tenderness / cord





- Neuro


Neuro: Alert and oriented X 3, Normal speech





Results





- Vitals


Vitals: 


 Vital Signs - 24 hr











  06/25/18





  13:45


 


Temperature 36.4 C L


 


Heart Rate 69


 


Respiratory 18





Rate 


 


Blood Pressure 170/70 H


 


O2 Saturation 100








 Oxygen











O2 Source []                   Room air


 


O2 Source                      Room air

















- EKG (time done)


  ** 1536


Rate: Rate (enter#) (69)


Rhythm: NSR


Axis: Normal


Intervals: Prolonged LA


Ischemia: Non specific changes


Computer interpretation: Agree with computer





- Labs


Labs: 


 Laboratory Tests











  06/25/18 06/25/18 06/25/18





  15:47 15:47 15:47


 


WBC  9.5  


 


RBC  4.48 L  


 


Hgb  13.4 L  


 


Hct  40.6 L  


 


MCV  90.7  


 


MCH  29.8  


 


MCHC  32.9  


 


RDW  13.6  


 


Plt Count  235  


 


MPV  7.1 L  


 


Neut # (Auto)  6.8 H  


 


Lymph # (Auto)  1.6  


 


Mono # (Auto)  1.0  


 


Eos # (Auto)  0.1  


 


Baso # (Auto)  0.1  


 


Absolute Nucleated RBC  0.00  


 


Nucleated RBC %  0.0  


 


Sodium   127 L 


 


Potassium   5.0 


 


Chloride   93 L 


 


Carbon Dioxide   23 


 


Anion Gap   11.0 


 


BUN   18 


 


Creatinine   1.5 H 


 


Estimated GFR (MDRD)   44 L 


 


Glucose   89 


 


Calcium   9.0 


 


Total Bilirubin   1.0 


 


AST   22 


 


ALT   14 


 


Alkaline Phosphatase   92 


 


Troponin I    < 0.04


 


Total Protein   7.0 


 


Albumin   3.9 


 


Globulin   3.1 


 


Albumin/Globulin Ratio   1.3 


 


Lipase   30 














- Rads (name of study)


  ** L spine CT


Radiology: EMP read contemporaneously (1. 14.1 degrees dextroscoliosis between 

mid L1 and L3-L4. 14.5 degrees levoscoliosis between L3-L4 and L5-S1. No 

subcutaneous listhesis. Moderate fatty atrophy of the multifidus muscle is 

seen. 2. L1-L2 shows disk osteophyte complex, moderate central stenosis. Severe 

left and mild right foraminal stenosis. 3. L2-L3 shows a broad-based disk bulge 

and prominent facets. Moderate central stenosis. Moderate to severe left 

foraminal stenosis. Right neuroforamina is normal. 4. L3-L4 shows disk space 

height loss and marked arthrosis, hypertrophic facets and ligament flavum. 

Severe central stenosis. Severe bilateral foraminal stenosis. )





  ** Ct A/P


Radiology: EMP read contemporaneously (1. There is motion artifact. 2. There is 

colonic and duodenal diverticulosis without evidence of diverticulitis. 3. No 

acute solid abdominal organ abnormalities are seen. 4. There is moderate stool 

within colon. The rectum is distended to 6.5 cm with stool. 5. Patient has 

undergone stenting of an abdominal aortic aneurysm. No acute vascular 

abnormalities are seen. 6. There is moderate multilevel lumbar spine 

degenerative disease. No acute bony abnormalities are seen. )





PD MEDICAL DECISION MAKING





- ED course


ED course: 





88-year-old gentleman with multiple comorbidities who is kind of a vague 

historian with a history compatible with a back injury and some back tenderness 

but the pain that he points to is kind of across the top of the hips and 

advanced imaging was done and the only acute abnormality was a fecal impaction.

  He certainly does have a bad back and chronic findings in the abdomen.  That 

said given the location of his pain we gave him an enema and had a large bowel 

movement with resolution of his symptoms.





- Sepsis Event


Vital Signs: 


 Vital Signs - 24 hr











  06/25/18





  13:45


 


Temperature 36.4 C L


 


Heart Rate 69


 


Respiratory 18





Rate 


 


Blood Pressure 170/70 H


 


O2 Saturation 100








 Oxygen











O2 Source []                   Room air


 


O2 Source                      Room air

















Departure





- Departure


Disposition: 01 Home, Self Care


Clinical Impression: 


 Fall from ground level, Fecal impaction





Abdominal pain


Qualifiers:


 Abdominal location: generalized Qualified Code(s): R10.84 - Generalized 

abdominal pain





Back pain


Qualifiers:


 Back pain location: low back pain Chronicity: acute Back pain laterality: 

bilateral Sciatica presence: without sciatica Qualified Code(s): M54.5 - Low 

back pain





Condition: Good


Record reviewed to determine appropriate education?: Yes


Instructions:  ED Impaction Fecal Treated


Prescriptions: 


Polyethylene Glycol 3350 [Miralax] 17 gm PO DAILY PRN #1 bottle


 PRN Reason: Constipation


Comments: 


Call your doctor to arrange a follow-up appointment, make the next available 

appointment.  In the interim, return anytime if worse or if new symptoms 

develop.





Your blood pressure was elevated today on check into the emergency department.  

This does not mean that you have hypertension, it is a common phenomenon to 

come to the emergency department and have elevated blood pressure.  I recommend 

that you see your primary care physician within the week to have it rechecked 

when you are feeling better.

## 2018-06-25 NOTE — CT REPORT
Procedure Date:  06/25/2018   

Accession Number:  [id number] / [id number]                    

Procedure:  CT  - Abdomen/Pelvis W/ CPT Code:  

 

FULL RESULT:

 

 

EXAM:

CT ABDOMEN AND PELVIS

 

EXAM DATE: 6/25/2018 04:45 PM.

 

CLINICAL HISTORY: Abdominal pain

 

COMPARISONS: 06/10/2018.

 

TECHNIQUE: Routine helical CT imaging was performed through the abdomen 

and pelvis. IV contrast: 50 cc Isovue-300. Enteric contrast: No. 

Reconstructions: Coronal and sagittal.

 

In accordance with CT protocol optimization, one or more of the following 

dose reduction techniques were utilized for this exam: automated exposure 

control, adjustment of mA and/or KV based on patient size, or use of 

iterative reconstructive technique.

 

FINDINGS:

Lung Bases: There is mild subpleural reticulation and there is mild 

cardiomegaly. No acute infiltrate.

 

Liver: Normal. No masses.

 

Gallbladder/Bile Ducts: Unremarkable.

 

Spleen: Normal.

 

Pancreas: Normal.

 

Adrenal Glands: Normal.

 

Kidneys: Normal. No masses or hydronephrosis.

 

Peritoneal Cavity/Bowel: There is a moderate-sized duodenal diverticulum. 

No evidence of small bowel obstruction. There is malrotation. There is 

distal colon diverticulosis. No evidence of diverticulitis. There is 

moderate stool within colon. The rectum is distended to 6.5 cm with 

stool. No evidence of appendicitis. No intraperitoneal free air or free 

fluid. No enlarged mesenteric or retroperitoneal lymph nodes.

 

Pelvic Organs: The urinary bladder is unremarkable. No acute pelvic organ 

abnormalities are seen.

 

Vasculature: Patient has undergone stenting of an abdominal aortic 

aneurysm. The origin of the superior mesenteric artery is not well seen. 

No acute vascular abnormalities are seen.

 

Bones: There is moderate multilevel lumbar spine degenerative disease. No 

acute bony abnormalities are seen.

 

Other: There is relative left iliopsoas atrophy.

IMPRESSION:

1. There is motion artifact.

2. There is colonic and duodenal diverticulosis without evidence of 

diverticulitis.

3. No acute solid abdominal organ abnormalities are seen.

4. There is moderate stool within colon. The rectum is distended to 6.5 

cm with stool.

5. Patient has undergone stenting of an abdominal aortic aneurysm. No 

acute vascular abnormalities are seen.

6. There is moderate multilevel lumbar spine degenerative disease. No 

acute bony abnormalities are seen.

 

[facility]

## 2018-06-26 ENCOUNTER — HOSPITAL ENCOUNTER (OUTPATIENT)
Dept: HOSPITAL 76 - EMS | Age: 83
Discharge: ANOTHER HOSPITAL | End: 2018-06-26
Attending: SURGERY
Payer: MEDICARE

## 2018-06-26 ENCOUNTER — HOSPITAL ENCOUNTER (EMERGENCY)
Dept: HOSPITAL 76 - ED | Age: 83
Discharge: HOME | End: 2018-06-26
Payer: MEDICARE

## 2018-06-26 VITALS — DIASTOLIC BLOOD PRESSURE: 82 MMHG | SYSTOLIC BLOOD PRESSURE: 106 MMHG

## 2018-06-26 DIAGNOSIS — E03.9: ICD-10-CM

## 2018-06-26 DIAGNOSIS — Z79.82: ICD-10-CM

## 2018-06-26 DIAGNOSIS — K59.00: ICD-10-CM

## 2018-06-26 DIAGNOSIS — R10.30: ICD-10-CM

## 2018-06-26 DIAGNOSIS — R10.84: Primary | ICD-10-CM

## 2018-06-26 DIAGNOSIS — M54.5: Primary | ICD-10-CM

## 2018-06-26 PROCEDURE — 96375 TX/PRO/DX INJ NEW DRUG ADDON: CPT

## 2018-06-26 PROCEDURE — 51798 US URINE CAPACITY MEASURE: CPT

## 2018-06-26 PROCEDURE — 99283 EMERGENCY DEPT VISIT LOW MDM: CPT

## 2018-06-26 PROCEDURE — 96361 HYDRATE IV INFUSION ADD-ON: CPT

## 2018-06-26 PROCEDURE — 74176 CT ABD & PELVIS W/O CONTRAST: CPT

## 2018-06-26 PROCEDURE — 99284 EMERGENCY DEPT VISIT MOD MDM: CPT

## 2018-06-26 PROCEDURE — 96374 THER/PROPH/DIAG INJ IV PUSH: CPT

## 2018-06-26 RX ADMIN — MINERAL OIL STA ML: 100 ENEMA RECTAL at 19:05

## 2018-06-26 RX ADMIN — ALUMINUM HYDROXIDE, MAGNESIUM HYDROXIDE, AND SIMETHICONE STA ML: 200; 200; 20 SUSPENSION ORAL at 20:51

## 2018-06-26 RX ADMIN — SODIUM CHLORIDE ONE MLS/HR: 9 INJECTION, SOLUTION INTRAVENOUS at 19:05

## 2018-06-26 RX ADMIN — MORPHINE SULFATE STA MG: 10 INJECTION INTRAVENOUS at 19:04

## 2018-06-26 RX ADMIN — LIDOCAINE HYDROCHLORIDE STA ML: 20 SOLUTION ORAL; TOPICAL at 20:51

## 2018-06-26 NOTE — CT REPORT
Procedure Date:  06/26/2018   

Accession Number:  [id number] / [id number]                    

Procedure:  CT  - Abdomen/Pelvis W/O CPT Code:  

 

FULL RESULT:

 

 

EXAM: CT ABDOMEN AND PELVIS.

 

EXAM DATE: 06/26/2018 08:49 PM.

 

CLINICAL HISTORY: Increasing abdominal pain and distention since 

yesterday.

 

COMPARISONS: 06/25/2018.

 

TECHNIQUE: Routine helical CT imaging was performed through the abdomen 

and pelvis. IV contrast: None. Enteric contrast: No. Reconstructions: 

Coronal and sagittal.

 

In accordance with CT protocol optimization, one or more of the following 

dose reduction techniques were utilized for this exam: automated exposure 

control, adjustment of mA and/or KV based on patient size, or use of 

iterative reconstructive technique.

 

FINDINGS:

Lung Bases:

Stable mild cardiomegaly.

Emphysematous changes.

 

Liver: Normal. No masses.

 

Gallbladder/Bile Ducts: Unremarkable.

 

Spleen: Normal.

 

Pancreas: Normal.

 

Adrenal Glands: Normal.

 

Kidneys: Normal. No masses or hydronephrosis.

 

Peritoneal Cavity/Bowel: Diverticula of the colon. No free fluid, free 

air or adenopathy. No masses or acute inflammatory process.

Appendix not visualized but no inflammatory changes adjacent to the cecum.

 

Pelvic Organs: Pelvis partially obscured by artifacts of the left total 

hip prosthesis. Prostatic calcification. No stones in the small caliber 

urinary bladder. No free fluid.

Interval evacuation of the rectal stool.

 

Vasculature: Prior aortobiiliac stenting of an infrarenal aortic 

aneurysm. Maximum diameter of the native aorta is 4.2 cm.

 

Bones: Multilevel marked degenerative disk disease throughout the 

scoliotic spine. Left total hip replacement.

 

Other: None.

IMPRESSION:

1. Mild cardiomegaly.

2. Chronic lung disease.

3. Colonic diverticulosis.

4. Stenting of an infrarenal abdominal aortic aneurysm.

5. Interval evacuation of the moderate amount of rectal stool. No 

excessive stool retention at this time.

6. No radiographic explanation for this gentlemen's symptoms.

 

[facility]

## 2018-06-26 NOTE — ED PHYSICIAN DOCUMENTATION
PD HPI ABD PAIN





- Stated complaint


Stated Complaint: ABD PX





- Chief complaint


Chief Complaint: Abd Pain





- History obtained from


History obtained from: Patient, Family





- History of Present Illness


Timing - onset: Today (had had abd pain with constipation Dx yesterday and had 

large BM post enema with relief of pain. Labs and CT lumbar and abd to evaluate 

for significant causes and normal results. Was feeling okay and then today 

shortly PTA, had onset of diffuse pain, bloating and nausea.)


Quality: Cramping, Aching, Pain


Location: All over / everywhere


Improved by: No: Laying still


Worsened by: Moving, Palpation


Associated symptoms: Nausea.  No: Fever, Vomiting, Diarrhea, Dysuria


Recently seen: Emergency Dept (yesterday)





Review of Systems


Unable to obtain: Dementia


Ten Systems: 10 systems reviewed and negative


Constitutional: denies: Fever


Cardiac: denies: Chest pain / pressure


Respiratory: denies: Dyspnea, Cough


GI: reports: Abdominal Pain, Abdominal Swelling, Nausea, Constipation.  denies: 

Vomiting, Diarrhea


: reports: Unable to Void.  denies: Dysuria, Frequency





PD PAST MEDICAL HISTORY





- Past Medical History


Past Medical History: Yes


Cardiovascular: Atrial fibrillation


Respiratory: None


Endocrine/Autoimmune: HyPOthyroidism


: Benign prostate hypertrophy


HEENT: Chronic hearing loss


Psych: None


Musculoskeletal: None


Derm: None





- Past Surgical History


Past Surgical History: Yes





- Present Medications


Home Medications: 


 Ambulatory Orders











 Medication  Instructions  Recorded  Confirmed


 


Aspirin 81 mg DAILY 07/16/17 02/17/18


 


Tamsulosin HCl 0.4 mg PO DAILY 07/16/17 02/17/18


 


Zolpidem Tartrate 10 mg PO QPM PRN 07/16/17 02/17/18


 


Latanoprost 0.005% Ophth Drops 1 drops EACHEYE QPM 02/17/18 02/17/18





[Xalatan Ophth Drops]   


 


Levothyroxine Sodium [Synthroid] 100 mcg PO DAILY #30 tablet 02/17/18 


 


Timolol 0.5% Ophth Drops [Timoptic 1 drops EACHEYE DAILY 02/17/18 02/17/18





0.5% Ophth Drops]   


 


Levothyroxine Sodium [Synthroid] 100 mcg PO DAILY #30 tablet 02/19/18 


 


Dorzolamide HCl  06/25/18 


 


Doxazosin [Cardura]  06/25/18 


 


Gabapentin 600 mg PO 06/25/18 


 


Ipratropium Bromide  06/25/18 


 


Polyethylene Glycol 3350 [Miralax] 17 gm PO DAILY PRN #1 bottle 06/25/18 














- Allergies


Allergies/Adverse Reactions: 


 Allergies











Allergy/AdvReac Type Severity Reaction Status Date / Time


 


No Known Drug Allergies Allergy   Verified 06/26/18 18:01














- Social History


Does the pt smoke?: No


Smoking Status: Never smoker


Does the pt drink ETOH?: No


Does the pt have substance abuse?: No





- Immunizations


Immunizations are current?: Yes





- POLST


Patient has POLST: No





PD ED PE NORMAL





- Vitals


Vital signs reviewed: Yes





- General


General: Alert and oriented X 3, Well developed/nourished, Other (appears in 

pain and uncomfortable. trying to find position. Abd feels somewhat distended. 

Hyperactive bowel sounds. )





- HEENT


HEENT: Pharynx benign





- Neck


Neck: Supple, no meningeal sign, No adenopathy





- Cardiac


Cardiac: RRR, No murmur





- Respiratory


Respiratory: Clear bilaterally





- Abdomen


Abdomen: Soft, No organomegaly, Other (somewhat distended and diffusely tender. 

).  No: Normal bowel sounds (increased)





- Male 


Male : Deferred





- Rectal


Rectal: Other (minimal/no stool at vault. Guiac negative.)





Results





- Vitals


Vitals: 


 Vital Signs - 24 hr











  06/26/18 06/26/18 06/26/18





  17:52 20:54 21:58


 


Temperature 36.4 C L  36.5 C


 


Heart Rate 82 98 96


 


Respiratory 18 16 18





Rate   


 


Blood Pressure 163/93 H 148/100 H 106/82 H


 


O2 Saturation 100 95 98








 Oxygen











O2 Source [With Activity]      Room air


 


O2 Source                      Room air

















- Rads (name of study)


  ** abd CT without contrast


Radiology: Prelim report reviewed (no bowel obstruction nor free fluid. )





PD MEDICAL DECISION MAKING





- ED course


Complexity details: re-evaluated patient (no pain, no distension, not tender 

abd. ), considered differential (he had considerable pain and felt bloated/

somewhat distended, so consideration of obstruction/volvulus/etc post 

constipation. CT done and was okay. He felt better with enema and some more 

stool output as well as some medicaiton. ), d/w patient





- Sepsis Event


Vital Signs: 


 Vital Signs - 24 hr











  06/26/18 06/26/18 06/26/18





  17:52 20:54 21:58


 


Temperature 36.4 C L  36.5 C


 


Heart Rate 82 98 96


 


Respiratory 18 16 18





Rate   


 


Blood Pressure 163/93 H 148/100 H 106/82 H


 


O2 Saturation 100 95 98








 Oxygen











O2 Source [With Activity]      Room air


 


O2 Source                      Room air

















Departure





- Departure


Disposition: 01 Home, Self Care


Clinical Impression: 


Abdominal pain


Qualifiers:


 Abdominal location: generalized Qualified Code(s): R10.84 - Generalized 

abdominal pain





Constipation


Qualifiers:


 Constipation type: unspecified constipation type Qualified Code(s): K59.00 - 

Constipation, unspecified





Condition: Stable


Record reviewed to determine appropriate education?: Yes


Follow-Up: 


Cesario Jay DO [Primary Care Provider] - 


Comments: 


Drink lots of fluids.  Your CT scan appeared okay again today.  I think you are 

likely having some gassiness and cramping related to the medication and 

treatment for the recent constipation.  This should improve over the next day 

or 2.  Use some Tylenol if needed for pains.


Discharge Date/Time: 06/26/18 22:01

## 2018-07-06 ENCOUNTER — HOSPITAL ENCOUNTER (OUTPATIENT)
Dept: HOSPITAL 76 - EMS | Age: 83
Discharge: ANOTHER HOSPITAL | End: 2018-07-06
Attending: SURGERY
Payer: MEDICARE

## 2018-07-06 ENCOUNTER — HOSPITAL ENCOUNTER (OUTPATIENT)
Dept: HOSPITAL 76 - ED | Age: 83
Setting detail: OBSERVATION
LOS: 1 days | Discharge: HOME | End: 2018-07-07
Attending: SPECIALIST | Admitting: SPECIALIST
Payer: MEDICARE

## 2018-07-06 DIAGNOSIS — I50.42: ICD-10-CM

## 2018-07-06 DIAGNOSIS — N40.1: ICD-10-CM

## 2018-07-06 DIAGNOSIS — N39.0: ICD-10-CM

## 2018-07-06 DIAGNOSIS — R91.8: ICD-10-CM

## 2018-07-06 DIAGNOSIS — Z87.891: ICD-10-CM

## 2018-07-06 DIAGNOSIS — I11.0: ICD-10-CM

## 2018-07-06 DIAGNOSIS — I48.0: ICD-10-CM

## 2018-07-06 DIAGNOSIS — M19.90: ICD-10-CM

## 2018-07-06 DIAGNOSIS — R10.13: ICD-10-CM

## 2018-07-06 DIAGNOSIS — Z79.51: ICD-10-CM

## 2018-07-06 DIAGNOSIS — Z79.899: ICD-10-CM

## 2018-07-06 DIAGNOSIS — Z87.440: ICD-10-CM

## 2018-07-06 DIAGNOSIS — R13.10: ICD-10-CM

## 2018-07-06 DIAGNOSIS — H91.90: ICD-10-CM

## 2018-07-06 DIAGNOSIS — R35.0: ICD-10-CM

## 2018-07-06 DIAGNOSIS — F01.50: ICD-10-CM

## 2018-07-06 DIAGNOSIS — M48.00: ICD-10-CM

## 2018-07-06 DIAGNOSIS — R26.0: ICD-10-CM

## 2018-07-06 DIAGNOSIS — Z79.82: ICD-10-CM

## 2018-07-06 DIAGNOSIS — R41.0: Primary | ICD-10-CM

## 2018-07-06 DIAGNOSIS — R39.15: ICD-10-CM

## 2018-07-06 DIAGNOSIS — Z91.81: ICD-10-CM

## 2018-07-06 DIAGNOSIS — G47.33: ICD-10-CM

## 2018-07-06 DIAGNOSIS — K57.30: ICD-10-CM

## 2018-07-06 DIAGNOSIS — Z95.828: ICD-10-CM

## 2018-07-06 DIAGNOSIS — G89.29: ICD-10-CM

## 2018-07-06 DIAGNOSIS — K57.10: ICD-10-CM

## 2018-07-06 DIAGNOSIS — H40.9: ICD-10-CM

## 2018-07-06 DIAGNOSIS — E87.1: ICD-10-CM

## 2018-07-06 DIAGNOSIS — M54.9: ICD-10-CM

## 2018-07-06 DIAGNOSIS — I69.811: ICD-10-CM

## 2018-07-06 DIAGNOSIS — Z66: ICD-10-CM

## 2018-07-06 DIAGNOSIS — E03.9: ICD-10-CM

## 2018-07-06 LAB
ALBUMIN DIAFP-MCNC: 3.7 G/DL (ref 3.2–5.5)
ALBUMIN/GLOB SERPL: 1.2 {RATIO} (ref 1–2.2)
ALP SERPL-CCNC: 86 IU/L (ref 42–121)
ALT SERPL W P-5'-P-CCNC: 15 IU/L (ref 10–60)
AMPHET UR QL SCN: NEGATIVE
ANION GAP SERPL CALCULATED.4IONS-SCNC: 12 MMOL/L (ref 6–13)
APAP SERPL-MCNC: 11 UG/ML (ref 10–30)
AST SERPL W P-5'-P-CCNC: 22 IU/L (ref 10–42)
BASOPHILS NFR BLD AUTO: 0 10^3/UL (ref 0–0.1)
BASOPHILS NFR BLD AUTO: 0.6 %
BENZODIAZ UR QL SCN: NEGATIVE
BILIRUB BLD-MCNC: 0.8 MG/DL (ref 0.2–1)
BUN SERPL-MCNC: 13 MG/DL (ref 6–20)
CALCIUM UR-MCNC: 9.2 MG/DL (ref 8.5–10.3)
CHLORIDE SERPL-SCNC: 96 MMOL/L (ref 101–111)
CLARITY UR REFRACT.AUTO: CLEAR
CO2 SERPL-SCNC: 21 MMOL/L (ref 21–32)
COCAINE UR-SCNC: NEGATIVE UMOL/L
CREAT SERPLBLD-SCNC: 1.3 MG/DL (ref 0.6–1.2)
EOSINOPHIL # BLD AUTO: 0.1 10^3/UL (ref 0–0.7)
EOSINOPHIL NFR BLD AUTO: 1.4 %
ERYTHROCYTE [DISTWIDTH] IN BLOOD BY AUTOMATED COUNT: 13.7 % (ref 12–15)
GFRSERPLBLD MDRD-ARVRAT: 52 ML/MIN/{1.73_M2} (ref 89–?)
GLOBULIN SER-MCNC: 3.1 G/DL (ref 2.1–4.2)
GLUCOSE SERPL-MCNC: 88 MG/DL (ref 70–100)
GLUCOSE UR QL STRIP.AUTO: NEGATIVE MG/DL
HGB UR QL STRIP: 12.1 G/DL (ref 14–18)
KETONES UR QL STRIP.AUTO: NEGATIVE MG/DL
LIPASE SERPL-CCNC: 21 U/L (ref 22–51)
LYMPHOCYTES # SPEC AUTO: 1.3 10^3/UL (ref 1.5–3.5)
LYMPHOCYTES NFR BLD AUTO: 19.4 %
MCH RBC QN AUTO: 30.2 PG (ref 27–31)
MCHC RBC AUTO-ENTMCNC: 33.7 G/DL (ref 32–36)
MCV RBC AUTO: 89.6 FL (ref 80–94)
METHADONE UR QL SCN: NEGATIVE
METHAMPHET UR QL SCN: NEGATIVE
MONOCYTES # BLD AUTO: 0.6 10^3/UL (ref 0–1)
MONOCYTES NFR BLD AUTO: 9.8 %
NEUTROPHILS # BLD AUTO: 4.5 10^3/UL (ref 1.5–6.6)
NEUTROPHILS # SNV AUTO: 6.5 X10^3/UL (ref 4.8–10.8)
NEUTROPHILS NFR BLD AUTO: 68.8 %
NITRITE UR QL STRIP.AUTO: NEGATIVE
OPIATES UR QL SCN: NEGATIVE
PDW BLD AUTO: 7.1 FL (ref 7.4–11.4)
PH UR STRIP.AUTO: 7 PH (ref 5–7.5)
PLATELET # BLD: 256 10^3/UL (ref 130–450)
PROT SPEC-MCNC: 6.8 G/DL (ref 6.7–8.2)
PROT UR STRIP.AUTO-MCNC: NEGATIVE MG/DL
RBC # UR STRIP.AUTO: NEGATIVE /UL
RBC MAR: 4.02 10^6/UL (ref 4.7–6.1)
SALICYLATES SERPL-MCNC: < 6 MG/DL
SODIUM SERPLBLD-SCNC: 129 MMOL/L (ref 135–145)
SP GR UR STRIP.AUTO: <=1.005 (ref 1–1.03)
SQUAMOUS URNS QL MICRO: (no result)
UROBILINOGEN UR QL STRIP.AUTO: (no result) E.U./DL
UROBILINOGEN UR STRIP.AUTO-MCNC: NEGATIVE MG/DL
VOLATILE DRUGS POS SERPL SCN: (no result)

## 2018-07-06 PROCEDURE — 85025 COMPLETE CBC W/AUTO DIFF WBC: CPT

## 2018-07-06 PROCEDURE — 84484 ASSAY OF TROPONIN QUANT: CPT

## 2018-07-06 PROCEDURE — 80306 DRUG TEST PRSMV INSTRMNT: CPT

## 2018-07-06 PROCEDURE — 83690 ASSAY OF LIPASE: CPT

## 2018-07-06 PROCEDURE — 74177 CT ABD & PELVIS W/CONTRAST: CPT

## 2018-07-06 PROCEDURE — 80320 DRUG SCREEN QUANTALCOHOLS: CPT

## 2018-07-06 PROCEDURE — 70450 CT HEAD/BRAIN W/O DYE: CPT

## 2018-07-06 PROCEDURE — 97162 PT EVAL MOD COMPLEX 30 MIN: CPT

## 2018-07-06 PROCEDURE — 99284 EMERGENCY DEPT VISIT MOD MDM: CPT

## 2018-07-06 PROCEDURE — 81001 URINALYSIS AUTO W/SCOPE: CPT

## 2018-07-06 PROCEDURE — 87086 URINE CULTURE/COLONY COUNT: CPT

## 2018-07-06 PROCEDURE — 80329 ANALGESICS NON-OPIOID 1 OR 2: CPT

## 2018-07-06 PROCEDURE — 81003 URINALYSIS AUTO W/O SCOPE: CPT

## 2018-07-06 PROCEDURE — 80307 DRUG TEST PRSMV CHEM ANLYZR: CPT

## 2018-07-06 PROCEDURE — 36415 COLL VENOUS BLD VENIPUNCTURE: CPT

## 2018-07-06 PROCEDURE — 80048 BASIC METABOLIC PNL TOTAL CA: CPT

## 2018-07-06 PROCEDURE — 99285 EMERGENCY DEPT VISIT HI MDM: CPT

## 2018-07-06 PROCEDURE — 80053 COMPREHEN METABOLIC PANEL: CPT

## 2018-07-06 NOTE — CT REPORT
Procedure Date:  07/06/2018   

Accession Number:  [id number] / [id number]                    

Procedure:  CT  - Abdomen/Pelvis W/ CPT Code:  

 

FULL RESULT:

 

 

EXAM:

CT ABDOMEN AND PELVIS

 

EXAM DATE: 7/6/2018 07:55 PM.

 

CLINICAL HISTORY: Abdominal pain

 

COMPARISONS: 6/26/2018.

 

TECHNIQUE: Routine helical CT imaging was performed through the abdomen 

and pelvis. IV contrast: ISOVUE 300  100mL. Enteric contrast: No. 

Reconstructions: Coronal and sagittal.

 

In accordance with CT protocol optimization, one or more of the following 

dose reduction techniques were utilized for this exam: automated exposure 

control, adjustment of mA and/or KV based on patient size, or use of 

iterative reconstructive technique.

 

FINDINGS:

Lung Bases: Unremarkable.

 

Liver: Normal. No masses.

 

Gallbladder/Bile Ducts: Unremarkable.

 

Spleen: Normal.

 

Pancreas: Normal.

 

Adrenal Glands: Normal.

 

Kidneys: Normal. No masses or hydronephrosis.

 

Peritoneal Cavity/Bowel: Duodenal diverticulum. Colonic diverticula 

without CT evidence of acute diverticulitis. No free fluid, free air or 

adenopathy. No masses or acute inflammatory process. The appendix is well 

visualized and normal.

 

Pelvic Organs: Evaluation is limited by streak artifact from the 

patient's left hip prosthesis. Visualized portions of the urinary bladder 

are unremarkable. The prostate is not well seen due to streak artifact.

 

Vasculature: Unchanged appearance to an infrarenal aortic aneurysm, 

status post aortobiiliac stenting.

 

Bones: Moderate to severe multilevel degenerative changes in the spine. 

Left hip arthroplasty. Nondisplaced fractures of the left transverse 

processes of L1-4.

 

Other: None.

IMPRESSION:

1. Nondisplaced fractures of the left transverse processes of L1-4. 

Otherwise no acute findings in the abdomen and pelvis.

2. Unchanged appearance to infrarenal aortic aneurysm status post 

stenting.

3. Colonic diverticula without CT evidence of acute diverticulitis.

 

[facility]

## 2018-07-06 NOTE — CT REPORT
Procedure Date:  07/06/2018   

Accession Number:  [id number] / [id number]                    

Procedure:  CT  - Head W/O CPT Code:  

 

FULL RESULT:

 

 

EXAM:

CT HEAD

 

EXAM DATE: 7/6/2018 07:46 PM.

 

CLINICAL HISTORY: Confusion.

 

COMPARISON: 2/16/2018.

 

TECHNIQUE: Multiaxial CT images were obtained from the foramen magnum to 

the vertex. Reformats: Coronal. IV contrast: None.

 

In accordance with CT protocol optimization, one or more of the following 

dose reduction techniques were utilized for this exam: automated exposure 

control, adjustment of mA and/or KV based on patient size, or use of 

iterative reconstructive technique.

 

FINDINGS:

Parenchyma: No intraparenchymal hemorrhage. No evidence of mass, midline 

shift, or CT findings of infarction. Gray-white differentiation is 

distinct. Moderate cortical volume loss, in keeping with the patient's 

age. There is periventricular and subcortical white matter 

hypoattenuation, nonspecific, most likely related to sequela of small 

vessel ischemic change.

 

Extraaxial Spaces: Mildly prominent, in keeping with the patient's age. 

No subdural or epidural collections identified.

 

Ventricles: Normal in size and position.

 

Sinuses and Orbits: Imaged paranasal sinuses, orbits, and mastoids show 

no significant abnormality.

 

Bones: No evidence of fracture or calvarial defect.

 

Other: None.

IMPRESSION:

1. No acute intracranial abnormality.

2. Cortical atrophy commensurate with age. Probable sequela of small 

vessel ischemic change.

 

[facility]

## 2018-07-06 NOTE — ED PHYSICIAN DOCUMENTATION
PD HPI ALTERED MENTAL STATUS





- Stated complaint


Stated Complaint: SI





- Chief complaint


Chief Complaint: MHE





- History obtained from


History obtained from: Patient, EMS





- History of Present Illness


Timing - onset: Other (88-year-old gentleman with history of AAA, frequent 

emergency department visits lately for falls and fecal impactions.  He is he 

went to the doctor's office today and he was confused and having suicidal 

ideation.  He recently started gabapentin but I am not sure for what he does 

not know either.  His vital signs were unremarkable at the doctor's office and 

he was referred here for further evaluation and treatment.  He definitely is 

confused and a poor historian.  The wife is not here on arrival but I am 

understanding that she is on the way.)





Review of Systems


Unable to obtain: Confused





PD PAST MEDICAL HISTORY





- Past Medical History


Cardiovascular: Atrial fibrillation


Respiratory: None


Endocrine/Autoimmune: HyPOthyroidism


: Benign prostate hypertrophy


HEENT: Chronic hearing loss


Psych: None


Musculoskeletal: None


Derm: None





- Past Surgical History


Past Surgical History: Yes





- Present Medications


Home Medications: 


 Ambulatory Orders











 Medication  Instructions  Recorded  Confirmed


 


Aspirin 81 mg DAILY 07/16/17 02/17/18


 


Tamsulosin HCl 0.4 mg PO DAILY 07/16/17 02/17/18


 


Zolpidem Tartrate 10 mg PO QPM PRN 07/16/17 02/17/18


 


Latanoprost 0.005% Ophth Drops 1 drops EACHEYE QPM 02/17/18 02/17/18





[Xalatan Ophth Drops]   


 


Levothyroxine Sodium [Synthroid] 100 mcg PO DAILY #30 tablet 02/17/18 


 


Timolol 0.5% Ophth Drops [Timoptic 1 drops EACHEYE DAILY 02/17/18 02/17/18





0.5% Ophth Drops]   


 


Levothyroxine Sodium [Synthroid] 100 mcg PO DAILY #30 tablet 02/19/18 


 


Dorzolamide HCl  06/25/18 


 


Doxazosin [Cardura]  06/25/18 


 


Gabapentin 600 mg PO 06/25/18 


 


Ipratropium Bromide  06/25/18 


 


Polyethylene Glycol 3350 [Miralax] 17 gm PO DAILY PRN #1 bottle 06/25/18 














- Allergies


Allergies/Adverse Reactions: 


 Allergies











Allergy/AdvReac Type Severity Reaction Status Date / Time


 


No Known Drug Allergies Allergy   Verified 07/06/18 18:43














- Social History


Does the pt smoke?: No


Smoking Status: Never smoker


Does the pt drink ETOH?: No


Does the pt have substance abuse?: No





- Immunizations


Immunizations are current?: Yes





- POLST


Patient has POLST: No





PD ED PE NORMAL





- Vitals


Vital signs reviewed: Yes





- General


General: Other (He is alert, oriented to person only, not place or time.  He 

admits to suicidal ideation but has no plan.  He thinks that is related to the 

gabapentin and is somewhat focused on the gabapentin and how much he does not 

like it.)





- HEENT


HEENT: PERRL, EOMI





- Neck


Neck: Supple, no meningeal sign, No bony TTP





- Cardiac


Cardiac: RRR, No murmur





- Respiratory


Respiratory: No respiratory distress, Clear bilaterally





- Abdomen


Abdomen: Other (Moderate epigastric and midabdominal tenderness without 

surgical signs.)





- Derm


Derm: Normal color, Warm and dry





- Extremities


Extremities: No edema, No calf tenderness / cord





- Neuro


Neuro: CNs 2-12 intact.  No: Alert and oriented X 3


Eye Opening: Spontaneous


Motor: Obeys Commands


Verbal: Confused


GCS Score: 14





- Psych


Psych: Normal mood, Normal affect





Results





- Vitals


Vitals: 


 Vital Signs - 24 hr











  07/06/18 07/06/18 07/06/18





  18:38 19:00 21:06


 


Temperature 36 C L  


 


Heart Rate  79 90


 


Respiratory 18 16 20





Rate   


 


Blood Pressure 181/78 H 181/78 H 144/96 H


 


O2 Saturation 100 100 100








 Oxygen











O2 Source [With Activity]      Room air


 


O2 Source                      Room air

















- Labs


Labs: 


 Laboratory Tests











  07/06/18 07/06/18 07/06/18





  18:54 18:54 18:54


 


WBC  6.5  


 


RBC  4.02 L  


 


Hgb  12.1 L  


 


Hct  36.0 L  


 


MCV  89.6  


 


MCH  30.2  


 


MCHC  33.7  


 


RDW  13.7  


 


Plt Count  256  


 


MPV  7.1 L  


 


Neut # (Auto)  4.5  


 


Lymph # (Auto)  1.3 L  


 


Mono # (Auto)  0.6  


 


Eos # (Auto)  0.1  


 


Baso # (Auto)  0.0  


 


Absolute Nucleated RBC  0.00  


 


Nucleated RBC %  0.0  


 


Sodium   129 L 


 


Potassium   4.2 


 


Chloride   96 L 


 


Carbon Dioxide   21 


 


Anion Gap   12.0 


 


BUN   13 


 


Creatinine   1.3 H 


 


Estimated GFR (MDRD)   52 L 


 


Glucose   88 


 


Calcium   9.2 


 


Total Bilirubin   0.8 


 


AST   22 


 


ALT   15 


 


Alkaline Phosphatase   86 


 


Troponin I    0.05


 


Total Protein   6.8 


 


Albumin   3.7 


 


Globulin   3.1 


 


Albumin/Globulin Ratio   1.2 


 


Lipase   21 L 


 


Urine Color   


 


Urine Clarity   


 


Urine pH   


 


Ur Specific Gravity   


 


Urine Protein   


 


Urine Glucose (UA)   


 


Urine Ketones   


 


Urine Occult Blood   


 


Urine Nitrite   


 


Urine Bilirubin   


 


Urine Urobilinogen   


 


Ur Leukocyte Esterase   


 


Urine RBC   


 


Urine WBC   


 


Ur Squamous Epith Cells   


 


Urine Bacteria   


 


Ur Microscopic Review   


 


Urine Culture Comments   


 


Salicylates   < 6.0 


 


Urine Opiates Screen   


 


Ur Oxycodone Screen   


 


Urine Methadone Screen   


 


Ur Propoxyphene Screen   


 


Acetaminophen   11 


 


Ur Barbiturates Screen   


 


Ur Tricyclics Screen   


 


Ur Phencyclidine Scrn   


 


Ur Amphetamine Screen   


 


U Methamphetamines Scrn   


 


U Benzodiazepines Scrn   


 


Urine Cocaine Screen   


 


U Cannabinoids Screen   


 


Ethyl Alcohol   < 5.0 














  07/06/18





  20:15


 


WBC 


 


RBC 


 


Hgb 


 


Hct 


 


MCV 


 


MCH 


 


MCHC 


 


RDW 


 


Plt Count 


 


MPV 


 


Neut # (Auto) 


 


Lymph # (Auto) 


 


Mono # (Auto) 


 


Eos # (Auto) 


 


Baso # (Auto) 


 


Absolute Nucleated RBC 


 


Nucleated RBC % 


 


Sodium 


 


Potassium 


 


Chloride 


 


Carbon Dioxide 


 


Anion Gap 


 


BUN 


 


Creatinine 


 


Estimated GFR (MDRD) 


 


Glucose 


 


Calcium 


 


Total Bilirubin 


 


AST 


 


ALT 


 


Alkaline Phosphatase 


 


Troponin I 


 


Total Protein 


 


Albumin 


 


Globulin 


 


Albumin/Globulin Ratio 


 


Lipase 


 


Urine Color  YELLOW


 


Urine Clarity  CLEAR


 


Urine pH  7.0


 


Ur Specific Gravity  <=1.005


 


Urine Protein  NEGATIVE


 


Urine Glucose (UA)  NEGATIVE


 


Urine Ketones  NEGATIVE


 


Urine Occult Blood  NEGATIVE


 


Urine Nitrite  NEGATIVE


 


Urine Bilirubin  NEGATIVE


 


Urine Urobilinogen  0.2 (NORMAL)


 


Ur Leukocyte Esterase  MODERATE H


 


Urine RBC  None Seen


 


Urine WBC  11-25 H


 


Ur Squamous Epith Cells  FEW Squamous


 


Urine Bacteria  Rare


 


Ur Microscopic Review  INDICATED


 


Urine Culture Comments  INDICATED


 


Salicylates 


 


Urine Opiates Screen  NEGATIVE


 


Ur Oxycodone Screen  NEGATIVE


 


Urine Methadone Screen  NEGATIVE


 


Ur Propoxyphene Screen  NEGATIVE


 


Acetaminophen 


 


Ur Barbiturates Screen  NEGATIVE


 


Ur Tricyclics Screen  NEGATIVE


 


Ur Phencyclidine Scrn  NEGATIVE


 


Ur Amphetamine Screen  NEGATIVE


 


U Methamphetamines Scrn  NEGATIVE


 


U Benzodiazepines Scrn  NEGATIVE


 


Urine Cocaine Screen  NEGATIVE


 


U Cannabinoids Screen  NEGATIVE


 


Ethyl Alcohol 














- Rads (name of study)


  ** CT A/P


Radiology: EMP read contemporaneously (Nondisplaced fractures of left 

transverse processes of L1 through L4, this was discussed with the on-call 

radiologist and they are new since Ilda 10, but were noted on the lumbar spine 

CT but not read.  Otherwise no acute disease, status post AAA.)





  ** Ct Head


Radiology: EMP read contemporaneously (small vessel ischemic)





PD MEDICAL DECISION MAKING





- ED course


ED course: 





This is an acutely confused 88-year-old gentleman with multiple recent falls 

and started gabapentin, I think in the last couple of days which I suspect is 

what made him acutely confused and he had suicidal ideation from it.  He did 

not clear in the emergency department.  Of note he had a recent fall with 

lumbar spine transverse process fractures which were not commented on on 

previous reads.  The wife was at the bedside later, she was not here on initial 

arrival, she is frankly almost as confused as she as he is.  I recommended 

potentially pursuing long-term care options other than living home alone which 

were basically scoffed at but given the persistent confusion he probably needs 

to be observed overnight to let the gabapentin wear off, because I presume that 

is the source of his confusion tonight and I spoke with Dr. Jackson for 

observation at 9:50 PM.





- Sepsis Event


Vital Signs: 


 Vital Signs - 24 hr











  07/06/18 07/06/18 07/06/18





  18:38 19:00 21:06


 


Temperature 36 C L  


 


Heart Rate  79 90


 


Respiratory 18 16 20





Rate   


 


Blood Pressure 181/78 H 181/78 H 144/96 H


 


O2 Saturation 100 100 100








 Oxygen











O2 Source [With Activity]      Room air


 


O2 Source                      Room air

















Departure





- Departure


Disposition: ED Place in Observation


Clinical Impression: 


 Confusion





Condition: Stable


Discharge Date/Time: 07/06/18 23:26

## 2018-07-07 VITALS — DIASTOLIC BLOOD PRESSURE: 64 MMHG | SYSTOLIC BLOOD PRESSURE: 120 MMHG

## 2018-07-07 LAB
ANION GAP SERPL CALCULATED.4IONS-SCNC: 6 MMOL/L (ref 6–13)
BUN SERPL-MCNC: 13 MG/DL (ref 6–20)
CALCIUM UR-MCNC: 8.6 MG/DL (ref 8.5–10.3)
CHLORIDE SERPL-SCNC: 103 MMOL/L (ref 101–111)
CO2 SERPL-SCNC: 24 MMOL/L (ref 21–32)
CREAT SERPLBLD-SCNC: 1.5 MG/DL (ref 0.6–1.2)
GFRSERPLBLD MDRD-ARVRAT: 44 ML/MIN/{1.73_M2} (ref 89–?)
GLUCOSE SERPL-MCNC: 91 MG/DL (ref 70–100)
SODIUM SERPLBLD-SCNC: 133 MMOL/L (ref 135–145)

## 2018-07-07 RX ADMIN — SODIUM CHLORIDE SCH MLS/HR: 9 INJECTION, SOLUTION INTRAVENOUS at 00:16

## 2018-07-07 RX ADMIN — SODIUM CHLORIDE, PRESERVATIVE FREE SCH: 5 INJECTION INTRAVENOUS at 10:07

## 2018-07-07 RX ADMIN — SODIUM CHLORIDE, PRESERVATIVE FREE SCH ML: 5 INJECTION INTRAVENOUS at 00:16

## 2018-07-07 RX ADMIN — SODIUM CHLORIDE SCH MLS/HR: 9 INJECTION, SOLUTION INTRAVENOUS at 10:06

## 2018-07-07 NOTE — DISCHARGE PLAN
Discharge Plan


Disposition: 01 Home, Self Care


Condition: Stable


Diet: Regular


Activity Restrictions: No Restrictions


Shower Restrictions: No


Driving Restrictions: No (would be advised to have driving abilities 

reevaluated )


Additional Instructions or Follow Up instructions: 


-You were sent to the ED from your PCP's office where you were for a routine 

appointment because you evidently said something that suggested you were 

considering harming yourself (suicidal ideation). 


However in the Emergency Room, you indicated you had no such thought.





-Your sodium was a little low (meaning you may have been a little dehydrate) 

and we gave you some IVFluid.


The sodium level was better on 7/7.  


** Make sure you drink enough liquid especially in the summer, at least ~ 8 

cups / day )2 Liters_


The admitting doctor and the ED doctor thought you might have some confusion; 

the low sodium/dehydration may have contributed.


There did not appear to be any other cause (no infection, urine had White blood 

cells, but does not appear infected, nostroke, no acute heart failure.)


If you are taking ambien 10 mg  , that is a BIG dose, would recommend trying a 

half dose (5mg)





-Reported Falls at home; (You reported some falls. No evident injury; You are 

not on anticoagulation/stroke prevention for atrial fibrillation evidently due 

to this falling history.


Physical therapy evaluated your gait and suggested you would benefit from using 

a walker a cane (although you did pretty well), but you indicated you would not 

use the cane.


For your enlarged prostate, it is recommended you take your flomax, cardura AT 

NIGHT for less lightheadedness during the day. (these drugs CAN cause imbalance)





-There was some concern whether you might not be taking your medications 

correctly (if extra doses of gabapentin etc might be causing confusion). Social 

work met with you. 


You declined any services from outside to visit your home to make sure you 

understood your  prescriptions and were taking them properly


  


No Smoking: If you smoke, Please STOP!  Call [phone] for help.


Follow-up with: 


Cesario Jay DO [Primary Care Provider] - 1 Week (routine follow up)

## 2018-07-07 NOTE — DISCHARGE SUMMARY
Discharge Summary


Admit Date: 07/06/18


Discharge Date: 07/07/18


[facility]


Primary Care Provider: Dr. Barnes (H/P noted Dr Natarajan, but patient states saw 

Dr. Barnes)


Code Status: Do Not Attempt Resuscitation


Condition at Discharge: Stable


Discharge Disposition: 01 Home, Self Care


Discharge Facility Name: [facility]





- DIAGNOSES


Admission Diagnoses: 





1) Acute confusion possible medication effect vs acute confusion superimposed 

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 

strokes)





2) Hyponatremia and chronic hyponatremia; mild (Na 129 on admit) 





3) PYuria/ no corresponding bactueria, no symptoms, not consistent with UTI


4) Compensated systolic and diastolic congestive heart failure


5) BPH


6) code status; not previously documented


Discharge Diagnoses with Status of Each Condition: 


1) Acute confusion possible medication effect vs acute confusion superimposed 

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 

strokes)


 Note; Patient had NO suicidal ideation which was the initial reason he was 

sent to the ED/ this appears to have been misconstrued


Head CT ; no new acute findings


Gabapentin which he has been on for some time for back pain at a generous dose 

for age 88 (and possible contribution by full dose ambien if taking) 

superimposed on baseline chronic cognitive defect seemed most likely 

contributing factors. He wished to stop gabapentin but given he has been on for 

a long time was advised on the need for taper and was given the below taper 

schedule. 


--Patient instructed to reduce by 50% x 1 week (300 mg 3x/ day x 1 week, 


       then 300 mg 2x/day x 1 week, 


     -then 300 mg at bedtime x 1 week, -----then STOP


He was advised to avoid the ambien entirely and at the very least take only 5 

mg not 10 mg . 





** met with patient and wife (who live alone, and are relatively 

isolated socially, and who both seem to be having at least early cognitive 

decline; they initially declined home health services, but later AFter 

discharge instructions were signed, family DID agree to evaluation by [facility] AND, an order was placed for Home Health Referral for Nursing (

evaluate medication management; especially the instructions for gabapentin taper

, and advisement to stop or reduce ambien, and for social work eval for home 

safety.








2) Hyponatremia and chronic hyponatremia; mild (Na 129 on admit) ; fairly 

consistent with recent values. Improved overnight with gentle IVF and most 

consistent with hypovolemic hyponatremia. Advised on adequate fluid intake, 

henri. in summer.





4) PYuria/ no corresponding bactueria, no symptoms, not consistent with UTI


  Review of past records showed presentations w/  pyuria as well, all with 

mixed  vivek not consistent with UTI. NOT treated. Not UTI.





5) Compensated systolic and diastolic congestive heart failure; compensated.


Blood pressure elevated w/ SBP ~ 180 on admission resolved w/ no intervention (

's on discharge)





6) BPH;  stable ; advised to take his flomax, cardura at night so as not to 

contribute to orthostasis/history of falls during upright daytime hours. 





7) Falls. He is a poor historian re: his falls, but orthostasis has been 

identified in past. (and he is not on anticoagulation for afib evidently 

because of a significant falls history including compression fractures on 

imaging this time NOT seen previously.) Evaluated by PT; who noted patient 

would benefit from walker or cane BUT, patient indicated he would not use, and 

didnt feel he needed it














- HPI


History of Present Illness: 





See Dr.Rosa Barakat very detailed H/P from 7/6/18 (especially in regard to ROS, 

history and social situation)


Briefly Patient is an 87 yo male who has had progressive confusion. Some of 

this was believed to be in large part related to gabapentin for his chronic 

pain. At the most recent outpatient appointment, evidently in his confusion and 

somewhat convoluted speaking pattern he said something to the effect that he 

just wanted to die (? due to back pain) and he was sent to the ED for suicidal 

ideation. Mr Garcia however denied such thoughts (other than to say that he was 

told his gabapentin was making him suicidal.) He only noted some epigastric 

discomfort on ED presentation. A corresponding CT abdomen as below had no acute 

corresponding findings. Labs were notable for a mild hyponatremia (not 

substantially different from prior labs and which improved with IVF), and mild 

CKD.  He was admitted for evaluation of possible causes of acute confusion 

superimposed on matheusley baseline chronic cognitive defects (although his wife 

did not seem to find him off from what she knows to be his baseline.).





Also addressed this admission was Code status (now no code) which is something 

evidently neither Mr Garcia, nor his wife had ever addressed or thought about 

before.  





see course by Problems Above





- CONSULTS | PROCEDURES


Consultations: Physical therapy


Procedures: 





none





- HOSPITAL COURSE


Hospital Course: 





see above under Status of each Problem





- ALLERGIES


Allergies/Adverse Reactions: 


 Allergies











Allergy/AdvReac Type Severity Reaction Status Date / Time


 


No Known Drug Allergies Allergy   Verified 07/06/18 18:43














- MEDICATIONS


Home Medications: 


 Ambulatory Orders











 Medication  Instructions  Recorded  Confirmed


 


Aspirin 81 mg PO DAILY 07/16/17 07/07/18


 


Tamsulosin HCl 0.4 mg PO DAILY 07/16/17 07/07/18


 


Zolpidem Tartrate 10 mg PO QPM PRN 07/16/17 07/07/18


 


Latanoprost 0.005% Ophth Drops 1 drops EACHEYE QPM 02/17/18 07/07/18





[Xalatan Ophth Drops]   


 


Timolol 0.5% Ophth Drops [Timoptic 1 drops EACHEYE BID 02/17/18 07/07/18





0.5% Ophth Drops]   


 


Dorzolamide HCl 1 drops EACHEYE TID 06/25/18 07/07/18


 


Doxazosin [Cardura] 4 mg PO QPM 06/25/18 07/07/18


 


Gabapentin 600 mg PO TID 06/25/18 07/07/18


 


Ipratropium Bromide 2 sprays TRAE BID 06/25/18 07/07/18


 


Levothyroxine Sodium [Synthroid] 100 mcg PO QDAC 07/07/18 07/07/18


 


Pilocarpine HCl [Pilocarpine HCl] 5 mg PO TID 07/07/18 07/07/18


 


Tramadol HCl 50 mg PO Q6H PRN 07/07/18 07/07/18


 


raNITIdine [Zantac] 150 mg PO DAILY 07/07/18 07/07/18











Home Medications Other | Comments: 





see instructions for Gabapentin taper under narrative above





- PHYSICAL EXAM AT DISCHARGE


General Appearance: positive: No acute distress (lying flat in bed, NAD, wife 

present), Alert


Eyes Bilateral: positive: EOMI


Respiratory: positive: No respiratory distress, Breath sounds nml


Cardiovascular: positive: Regular rate & rhythm


Abdomen: positive: Nml bowel sounds (very mildly tender epigastrium, no guarding

, ), No distention, Tenderness.  negative: Guarding, Rebound


Back: positive: Nml inspection.  negative: CVA tenderness (R), CVA tenderness (L

)


Skin: positive: Warm, Dry


Extremities: positive: No pedal edema


Neurologic/Psychiatric: positive: Oriented x3 (Laughs when asked if recalls why 

in the hospital " I have no idea, they said to come")





- LABS


Result Diagrams: 


 07/06/18 18:54





 07/07/18 06:51





- DIAGNOSTIC IMAGING


Diagnostic Imaging Results Comments: 





CT head; no acute findings. cortical volume loss most consistent with small 

vessel ischemic changes





CT abdomen;  no acute finding


   Nondisplaced Fracture Left transverse process T1-T4 (new finding), 


   Findings c/w known infrarenal AA stenting


 Diverticulosis, no diverticulitis





- FOLLOW UP


Follow Up: 





Patient will be visited by [facility], and he will have a Home 

Health visit by an RN for safety evaluation, and review of Rx instructions. 


Routine followup with PCP. 








- TIME SPENT


Time Spent in Discharge (Minutes): 30

## 2018-08-26 ENCOUNTER — HOSPITAL ENCOUNTER (EMERGENCY)
Dept: HOSPITAL 76 - ED | Age: 83
Discharge: HOME | End: 2018-08-26
Payer: MEDICARE

## 2018-08-26 VITALS — SYSTOLIC BLOOD PRESSURE: 137 MMHG | DIASTOLIC BLOOD PRESSURE: 64 MMHG

## 2018-08-26 DIAGNOSIS — K59.00: Primary | ICD-10-CM

## 2018-08-26 DIAGNOSIS — F03.90: ICD-10-CM

## 2018-08-26 DIAGNOSIS — Z96.649: ICD-10-CM

## 2018-08-26 DIAGNOSIS — N30.00: ICD-10-CM

## 2018-08-26 DIAGNOSIS — E87.1: ICD-10-CM

## 2018-08-26 DIAGNOSIS — Z86.73: ICD-10-CM

## 2018-08-26 DIAGNOSIS — E03.9: ICD-10-CM

## 2018-08-26 LAB
ALBUMIN DIAFP-MCNC: 3.5 G/DL (ref 3.2–5.5)
ALBUMIN/GLOB SERPL: 1.6 {RATIO} (ref 1–2.2)
ALP SERPL-CCNC: 62 IU/L (ref 42–121)
ALT SERPL W P-5'-P-CCNC: 16 IU/L (ref 10–60)
ANION GAP SERPL CALCULATED.4IONS-SCNC: 8 MMOL/L (ref 6–13)
AST SERPL W P-5'-P-CCNC: 19 IU/L (ref 10–42)
BASOPHILS NFR BLD AUTO: 0 10^3/UL (ref 0–0.1)
BASOPHILS NFR BLD AUTO: 0.2 %
BILIRUB BLD-MCNC: 0.8 MG/DL (ref 0.2–1)
BUN SERPL-MCNC: 18 MG/DL (ref 6–20)
CALCIUM UR-MCNC: 8.4 MG/DL (ref 8.5–10.3)
CHLORIDE SERPL-SCNC: 95 MMOL/L (ref 101–111)
CLARITY UR REFRACT.AUTO: CLEAR
CO2 SERPL-SCNC: 21 MMOL/L (ref 21–32)
CREAT SERPLBLD-SCNC: 1.8 MG/DL (ref 0.6–1.2)
EOSINOPHIL # BLD AUTO: 0.5 10^3/UL (ref 0–0.7)
EOSINOPHIL NFR BLD AUTO: 7.5 %
ERYTHROCYTE [DISTWIDTH] IN BLOOD BY AUTOMATED COUNT: 14.4 % (ref 12–15)
GFRSERPLBLD MDRD-ARVRAT: 36 ML/MIN/{1.73_M2} (ref 89–?)
GLOBULIN SER-MCNC: 2.2 G/DL (ref 2.1–4.2)
GLUCOSE SERPL-MCNC: 86 MG/DL (ref 70–100)
GLUCOSE UR QL STRIP.AUTO: NEGATIVE MG/DL
HGB UR QL STRIP: 10.5 G/DL (ref 14–18)
KETONES UR QL STRIP.AUTO: NEGATIVE MG/DL
LIPASE SERPL-CCNC: 35 U/L (ref 22–51)
LYMPHOCYTES # SPEC AUTO: 0.3 10^3/UL (ref 1.5–3.5)
LYMPHOCYTES NFR BLD AUTO: 4.9 %
MCH RBC QN AUTO: 30.8 PG (ref 27–31)
MCHC RBC AUTO-ENTMCNC: 34.4 G/DL (ref 32–36)
MCV RBC AUTO: 89.4 FL (ref 80–94)
MONOCYTES # BLD AUTO: 0.6 10^3/UL (ref 0–1)
MONOCYTES NFR BLD AUTO: 8.6 %
NEUTROPHILS # BLD AUTO: 5.4 10^3/UL (ref 1.5–6.6)
NEUTROPHILS # SNV AUTO: 6.8 X10^3/UL (ref 4.8–10.8)
NEUTROPHILS NFR BLD AUTO: 78.8 %
NITRITE UR QL STRIP.AUTO: POSITIVE
PDW BLD AUTO: 7.2 FL (ref 7.4–11.4)
PH UR STRIP.AUTO: 6 PH (ref 5–7.5)
PLATELET # BLD: 172 10^3/UL (ref 130–450)
PROT SPEC-MCNC: 5.7 G/DL (ref 6.7–8.2)
PROT UR STRIP.AUTO-MCNC: (no result) MG/DL
RBC # UR STRIP.AUTO: NEGATIVE /UL
RBC # URNS HPF: (no result) /HPF (ref 0–5)
RBC MAR: 3.4 10^6/UL (ref 4.7–6.1)
SODIUM SERPLBLD-SCNC: 124 MMOL/L (ref 135–145)
SP GR UR STRIP.AUTO: <=1.005 (ref 1–1.03)
SQUAMOUS URNS QL MICRO: (no result)
UROBILINOGEN UR QL STRIP.AUTO: (no result) E.U./DL
UROBILINOGEN UR STRIP.AUTO-MCNC: NEGATIVE MG/DL
WBC CLUMPS URNS QL MICRO: PRESENT

## 2018-08-26 PROCEDURE — 80053 COMPREHEN METABOLIC PANEL: CPT

## 2018-08-26 PROCEDURE — 36415 COLL VENOUS BLD VENIPUNCTURE: CPT

## 2018-08-26 PROCEDURE — 83605 ASSAY OF LACTIC ACID: CPT

## 2018-08-26 PROCEDURE — 85025 COMPLETE CBC W/AUTO DIFF WBC: CPT

## 2018-08-26 PROCEDURE — 99283 EMERGENCY DEPT VISIT LOW MDM: CPT

## 2018-08-26 PROCEDURE — 96361 HYDRATE IV INFUSION ADD-ON: CPT

## 2018-08-26 PROCEDURE — 81003 URINALYSIS AUTO W/O SCOPE: CPT

## 2018-08-26 PROCEDURE — 51798 US URINE CAPACITY MEASURE: CPT

## 2018-08-26 PROCEDURE — 96374 THER/PROPH/DIAG INJ IV PUSH: CPT

## 2018-08-26 PROCEDURE — 83690 ASSAY OF LIPASE: CPT

## 2018-08-26 PROCEDURE — 81001 URINALYSIS AUTO W/SCOPE: CPT

## 2018-08-26 PROCEDURE — 87086 URINE CULTURE/COLONY COUNT: CPT

## 2018-08-26 NOTE — ED PHYSICIAN DOCUMENTATION
PD HPI MALE 





- Stated complaint


Stated Complaint: MALE 





- Chief complaint


Chief Complaint: UTI





- History obtained from


History obtained from: Patient





- History of Present Illness


Timing - onset: How many days ago (several)


Timing - duration: Days (3)


Timing - details: Gradual onset, Still present


Associated symptoms: Dysuria, Urinary frequency


Similar symptoms before: Diagnosis (Dx with UTI and Rx with Bactrim. Got it 

from [facility] few days ago, without improvment.)


Recently seen: Clinic





Review of Systems


Constitutional: reports: Myalgias, Fatigue.  denies: Fever, Chills


Nose: denies: Rhinorrhea / runny nose, Congestion


Throat: denies: Sore throat


Cardiac: denies: Chest pain / pressure, Palpitations


Respiratory: denies: Dyspnea, Cough


GI: reports: Constipation (no BM for 4 days, with feeling of rectal fullness. 

Feeling he is constipated.).  denies: Abdominal Pain, Nausea, Vomiting, Diarrhea


: reports: Frequency.  denies: Dysuria





PD PAST MEDICAL HISTORY





- Past Medical History


Past Medical History: Yes


Cardiovascular: Atrial fibrillation


Respiratory: None


Neuro: Dementia, CVA


Endocrine/Autoimmune: HyPOthyroidism


: Benign prostate hypertrophy


HEENT: Chronic hearing loss


Psych: None


Musculoskeletal: Osteoarthritis


Derm: None





- Past Surgical History


Past Surgical History: Yes


Ortho: Hip replacement





- Present Medications


Home Medications: 


 Ambulatory Orders











 Medication  Instructions  Recorded  Confirmed


 


Aspirin 81 mg PO DAILY 07/16/17 08/26/18


 


Tamsulosin HCl 0.4 mg PO DAILY 07/16/17 08/26/18


 


Zolpidem Tartrate 10 mg PO QPM PRN 07/16/17 08/26/18


 


Latanoprost 0.005% Ophth Drops 1 drops EACHEYE QPM 02/17/18 08/26/18





[Xalatan Ophth Drops]   


 


Timolol 0.5% Ophth Drops [Timoptic 1 drops EACHEYE BID 02/17/18 08/26/18





0.5% Ophth Drops]   


 


Dorzolamide HCl 1 drops EACHEYE TID 06/25/18 08/26/18


 


Doxazosin [Cardura] 4 mg PO QPM 06/25/18 08/26/18


 


Ipratropium Bromide 2 sprays TRAE BID 06/25/18 08/26/18


 


Levothyroxine Sodium [Synthroid] 100 mcg PO QDAC 07/07/18 08/26/18


 


Pilocarpine HCl [Pilocarpine HCl] 5 mg PO TID 07/07/18 08/26/18


 


Tramadol HCl 50 mg PO Q6H PRN 07/07/18 08/26/18


 


raNITIdine [Zantac] 150 mg PO DAILY 07/07/18 08/26/18


 


Cephalexin [Keflex] 500 mg PO QID #24 capsule 08/26/18 


 


Docusate Sodium 100 mg PO DAILY #30 capsule 08/26/18 














- Allergies


Allergies/Adverse Reactions: 


 Allergies











Allergy/AdvReac Type Severity Reaction Status Date / Time


 


No Known Drug Allergies Allergy   Verified 07/06/18 18:43














- Social History


Does the pt smoke?: No


Smoking Status: Never smoker


Does the pt drink ETOH?: No


Does the pt have substance abuse?: No





- Immunizations


Immunizations are current?: Yes





- POLST


Patient has POLST: No





PD ED PE NORMAL





- Vitals


Vital signs reviewed: Yes





- General


General: Alert and oriented X 3, Well developed/nourished





- HEENT


HEENT: Pharynx benign





- Neck


Neck: Supple, no meningeal sign, No adenopathy





- Cardiac


Cardiac: RRR, No murmur





- Respiratory


Respiratory: No respiratory distress, Clear bilaterally





- Abdomen


Abdomen: Normal bowel sounds, Soft, Non distended





- Male 


Male : Deferred





- Rectal


Rectal: Other (firm ball of stool, about tennis bal sized, broken up with 

fingertip.)





- Back


Back: No CVA TTP





- Derm


Derm: Normal color, Warm and dry





- Extremities


Extremities: No deformity, No tenderness to palpate





- Neuro


Neuro: Alert and oriented X 3, No motor deficit, Normal speech





Results





- Vitals


Vitals: 


 Vital Signs - 24 hr











  08/26/18 08/26/18 08/26/18





  09:50 12:17 14:31


 


Temperature 36.2 C L  


 


Heart Rate 76 68 74


 


Respiratory 14 18 20





Rate   


 


Blood Pressure 93/48 L 132/54 H 137/64 H


 


O2 Saturation 100 98 99








 Oxygen











O2 Source [With Activity]      Room air


 


O2 Source                      Room air

















- Labs


Labs: 


 Laboratory Tests











  08/26/18 08/26/18 08/26/18





  10:50 10:50 10:50


 


WBC  6.8  


 


RBC  3.40 L  


 


Hgb  10.5 L  


 


Hct  30.4 L  


 


MCV  89.4  


 


MCH  30.8  


 


MCHC  34.4  


 


RDW  14.4  


 


Plt Count  172  


 


MPV  7.2 L  


 


Neut # (Auto)  5.4  


 


Lymph # (Auto)  0.3 L  


 


Mono # (Auto)  0.6  


 


Eos # (Auto)  0.5  


 


Baso # (Auto)  0.0  


 


Absolute Nucleated RBC  0.00  


 


Nucleated RBC %  0.0  


 


Sodium   124 L 


 


Potassium   3.8 


 


Chloride   95 L 


 


Carbon Dioxide   21 


 


Anion Gap   8.0 


 


BUN   18 


 


Creatinine   1.8 H 


 


Estimated GFR (MDRD)   36 L 


 


Glucose   86 


 


Lactic Acid    1.2


 


Calcium   8.4 L 


 


Total Bilirubin   0.8 


 


AST   19 


 


ALT   16 


 


Alkaline Phosphatase   62 


 


Total Protein   5.7 L 


 


Albumin   3.5 


 


Globulin   2.2 


 


Albumin/Globulin Ratio   1.6 


 


Lipase   35 


 


Urine Color   


 


Urine Clarity   


 


Urine pH   


 


Ur Specific Gravity   


 


Urine Protein   


 


Urine Glucose (UA)   


 


Urine Ketones   


 


Urine Occult Blood   


 


Urine Nitrite   


 


Urine Bilirubin   


 


Urine Urobilinogen   


 


Ur Leukocyte Esterase   


 


Urine RBC   


 


Urine WBC   


 


Urine WBC Clumps   


 


Ur Squamous Epith Cells   


 


Urine Bacteria   


 


Ur Microscopic Review   


 


Urine Culture Comments   














  08/26/18





  11:55


 


WBC 


 


RBC 


 


Hgb 


 


Hct 


 


MCV 


 


MCH 


 


MCHC 


 


RDW 


 


Plt Count 


 


MPV 


 


Neut # (Auto) 


 


Lymph # (Auto) 


 


Mono # (Auto) 


 


Eos # (Auto) 


 


Baso # (Auto) 


 


Absolute Nucleated RBC 


 


Nucleated RBC % 


 


Sodium 


 


Potassium 


 


Chloride 


 


Carbon Dioxide 


 


Anion Gap 


 


BUN 


 


Creatinine 


 


Estimated GFR (MDRD) 


 


Glucose 


 


Lactic Acid 


 


Calcium 


 


Total Bilirubin 


 


AST 


 


ALT 


 


Alkaline Phosphatase 


 


Total Protein 


 


Albumin 


 


Globulin 


 


Albumin/Globulin Ratio 


 


Lipase 


 


Urine Color  DARK YELLOW


 


Urine Clarity  CLEAR


 


Urine pH  6.0


 


Ur Specific Gravity  <=1.005


 


Urine Protein  TRACE


 


Urine Glucose (UA)  NEGATIVE


 


Urine Ketones  NEGATIVE


 


Urine Occult Blood  NEGATIVE


 


Urine Nitrite  POSITIVE H


 


Urine Bilirubin  NEGATIVE


 


Urine Urobilinogen  1 (NORMAL)


 


Ur Leukocyte Esterase  MODERATE H


 


Urine RBC  0-5


 


Urine WBC  4-5


 


Urine WBC Clumps  PRESENT


 


Ur Squamous Epith Cells  RARE Squamous


 


Urine Bacteria  Rare


 


Ur Microscopic Review  INDICATED


 


Urine Culture Comments  INDICATED














PD MEDICAL DECISION MAKING





- ED course


Complexity details: reviewed results (still with UTI, so gave Rocephin and will 

have cultures guide further treatment), considered differential (couple of 

complaints: still with dysuria after several days on abx (found to be Bactrim 

by calling pharmacy). Also complains of constipation and feeling of rectal 

fullness, found to be having some impaction on rectal exam. Broken up 

digitially and then flushed with enema. Moderate stool out. ), d/w patient





- Sepsis Event


Vital Signs: 


 Vital Signs - 24 hr











  08/26/18 08/26/18 08/26/18





  09:50 12:17 14:31


 


Temperature 36.2 C L  


 


Heart Rate 76 68 74


 


Respiratory 14 18 20





Rate   


 


Blood Pressure 93/48 L 132/54 H 137/64 H


 


O2 Saturation 100 98 99








 Oxygen











O2 Source [With Activity]      Room air


 


O2 Source                      Room air

















Departure





- Departure


Disposition: 01 Home, Self Care


Clinical Impression: 


 Fecal impaction in rectum, Hyponatremia





UTI (urinary tract infection)


Qualifiers:


 Urinary tract infection type: acute cystitis Hematuria presence: without 

hematuria Qualified Code(s): N30.00 - Acute cystitis without hematuria





Constipation


Qualifiers:


 Constipation type: unspecified constipation type Qualified Code(s): K59.00 - 

Constipation, unspecified





Condition: Stable


Record reviewed to determine appropriate education?: Yes


Instructions:  ED UTI Cystitis Male


Follow-Up: 


Cesario Jay DO [Primary Care Provider] - 


Prescriptions: 


Cephalexin [Keflex] 500 mg PO QID #24 capsule


Docusate Sodium 100 mg PO DAILY #30 capsule


Comments: 


Drink lots of fluids.  Discontinue your current antibiotic since you still have 

urinary infection despite it.  Changed to cephalexin as directed for a week.  

Add docusate stool softener daily for the next several days to week.  Follow-up 

with your primary care in 2-3 days, call for an appointment. Your sodium level 

is a little bit lower than your usual and your primary care should recheck that 

later this week as well.


Discharge Date/Time: 08/26/18 14:31

## 2018-08-29 ENCOUNTER — HOSPITAL ENCOUNTER (EMERGENCY)
Dept: HOSPITAL 76 - ED | Age: 83
Discharge: HOME | End: 2018-08-29
Payer: MEDICARE

## 2018-08-29 ENCOUNTER — HOSPITAL ENCOUNTER (OUTPATIENT)
Dept: HOSPITAL 76 - EMS | Age: 83
Discharge: ANOTHER HOSPITAL | End: 2018-08-29
Attending: SURGERY
Payer: MEDICARE

## 2018-08-29 VITALS — SYSTOLIC BLOOD PRESSURE: 119 MMHG | DIASTOLIC BLOOD PRESSURE: 64 MMHG

## 2018-08-29 DIAGNOSIS — F03.90: ICD-10-CM

## 2018-08-29 DIAGNOSIS — R10.30: Primary | ICD-10-CM

## 2018-08-29 DIAGNOSIS — Z96.649: ICD-10-CM

## 2018-08-29 DIAGNOSIS — Z79.82: ICD-10-CM

## 2018-08-29 DIAGNOSIS — Z86.73: ICD-10-CM

## 2018-08-29 DIAGNOSIS — N30.90: Primary | ICD-10-CM

## 2018-08-29 DIAGNOSIS — E03.9: ICD-10-CM

## 2018-08-29 LAB
ALBUMIN DIAFP-MCNC: 3 G/DL (ref 3.2–5.5)
ALBUMIN/GLOB SERPL: 1.4 {RATIO} (ref 1–2.2)
ALP SERPL-CCNC: 58 IU/L (ref 42–121)
ALT SERPL W P-5'-P-CCNC: 15 IU/L (ref 10–60)
ANION GAP SERPL CALCULATED.4IONS-SCNC: 7 MMOL/L (ref 6–13)
AST SERPL W P-5'-P-CCNC: 18 IU/L (ref 10–42)
BASOPHILS NFR BLD AUTO: 0 10^3/UL (ref 0–0.1)
BASOPHILS NFR BLD AUTO: 0.4 %
BILIRUB BLD-MCNC: 0.6 MG/DL (ref 0.2–1)
BUN SERPL-MCNC: 18 MG/DL (ref 6–20)
CALCIUM UR-MCNC: 8.6 MG/DL (ref 8.5–10.3)
CHLORIDE SERPL-SCNC: 99 MMOL/L (ref 101–111)
CLARITY UR REFRACT.AUTO: CLEAR
CO2 SERPL-SCNC: 18 MMOL/L (ref 21–32)
CREAT SERPLBLD-SCNC: 1.8 MG/DL (ref 0.6–1.2)
EOSINOPHIL # BLD AUTO: 0.5 10^3/UL (ref 0–0.7)
EOSINOPHIL NFR BLD AUTO: 9.7 %
ERYTHROCYTE [DISTWIDTH] IN BLOOD BY AUTOMATED COUNT: 14.3 % (ref 12–15)
GFRSERPLBLD MDRD-ARVRAT: 36 ML/MIN/{1.73_M2} (ref 89–?)
GLOBULIN SER-MCNC: 2.2 G/DL (ref 2.1–4.2)
GLUCOSE SERPL-MCNC: 85 MG/DL (ref 70–100)
GLUCOSE UR QL STRIP.AUTO: NEGATIVE MG/DL
HGB UR QL STRIP: 8.9 G/DL (ref 14–18)
KETONES UR QL STRIP.AUTO: NEGATIVE MG/DL
LIPASE SERPL-CCNC: 27 U/L (ref 22–51)
LYMPHOCYTES # SPEC AUTO: 0.7 10^3/UL (ref 1.5–3.5)
LYMPHOCYTES NFR BLD AUTO: 13.9 %
MCH RBC QN AUTO: 30.2 PG (ref 27–31)
MCHC RBC AUTO-ENTMCNC: 33.8 G/DL (ref 32–36)
MCV RBC AUTO: 89.2 FL (ref 80–94)
MONOCYTES # BLD AUTO: 0.7 10^3/UL (ref 0–1)
MONOCYTES NFR BLD AUTO: 12.7 %
NEUTROPHILS # BLD AUTO: 3.3 10^3/UL (ref 1.5–6.6)
NEUTROPHILS # SNV AUTO: 5.2 X10^3/UL (ref 4.8–10.8)
NEUTROPHILS NFR BLD AUTO: 63.3 %
NITRITE UR QL STRIP.AUTO: NEGATIVE
PDW BLD AUTO: 6.9 FL (ref 7.4–11.4)
PH UR STRIP.AUTO: 6 PH (ref 5–7.5)
PLATELET # BLD: 169 10^3/UL (ref 130–450)
PROT SPEC-MCNC: 5.2 G/DL (ref 6.7–8.2)
PROT UR STRIP.AUTO-MCNC: NEGATIVE MG/DL
RBC # UR STRIP.AUTO: (no result) /UL
RBC MAR: 2.96 10^6/UL (ref 4.7–6.1)
SODIUM SERPLBLD-SCNC: 124 MMOL/L (ref 135–145)
SP GR UR STRIP.AUTO: 1.01 (ref 1–1.03)
SQUAMOUS URNS QL MICRO: (no result)
UROBILINOGEN UR QL STRIP.AUTO: (no result) E.U./DL
UROBILINOGEN UR STRIP.AUTO-MCNC: NEGATIVE MG/DL

## 2018-08-29 PROCEDURE — 81001 URINALYSIS AUTO W/SCOPE: CPT

## 2018-08-29 PROCEDURE — 87086 URINE CULTURE/COLONY COUNT: CPT

## 2018-08-29 PROCEDURE — 80053 COMPREHEN METABOLIC PANEL: CPT

## 2018-08-29 PROCEDURE — 36415 COLL VENOUS BLD VENIPUNCTURE: CPT

## 2018-08-29 PROCEDURE — 81003 URINALYSIS AUTO W/O SCOPE: CPT

## 2018-08-29 PROCEDURE — 83690 ASSAY OF LIPASE: CPT

## 2018-08-29 PROCEDURE — 74176 CT ABD & PELVIS W/O CONTRAST: CPT

## 2018-08-29 PROCEDURE — 99283 EMERGENCY DEPT VISIT LOW MDM: CPT

## 2018-08-29 PROCEDURE — 85025 COMPLETE CBC W/AUTO DIFF WBC: CPT

## 2018-08-29 NOTE — CT REPORT
Reason:  lower abd pain

Procedure Date:  08/29/2018   

Accession Number:  [id number] / [id number]                    

Procedure:  CT  - Abdomen/Pelvis W/O CPT Code:  

 

FULL RESULT:

 

 

EXAM:

CT ABDOMEN AND PELVIS

 

EXAM DATE: 8/29/2018 04:32 AM.

 

CLINICAL HISTORY: Lower abdominal pain

 

COMPARISONS: 07/06/2018.

 

TECHNIQUE: Routine helical CT imaging was performed through the abdomen 

and pelvis. IV contrast: None. Enteric contrast: No. Reconstructions: 

Coronal and sagittal.

 

In accordance with CT protocol optimization, one or more of the following 

dose reduction techniques were utilized for this exam: automated exposure 

control, adjustment of mA and/or KV based on patient size, or use of 

iterative reconstructive technique.

 

FINDINGS:

Lung Bases: Trace effusions and basilar atelectasis.

 

Liver: Normal. No masses.

 

Gallbladder/Bile Ducts: Unremarkable.

 

Spleen: Normal.

 

Pancreas: Normal.

 

Adrenal Glands: Normal.

 

Kidneys: Normal. No masses or hydronephrosis.

 

Peritoneal Cavity/Bowel: Colonic diverticulosis, without CT evidence of 

diverticulitis. No bowel dilatation, free gas, or free fluid.

 

Pelvic Organs: Normal. The bladder and visualized pelvic organs are 

within normal limits.

 

Vasculature: Aortobiiliac stent graft appears stable.

 

Bones: Extensive degenerative changes. Previous left hip replacement.

 

Other: None.

IMPRESSION: No evident etiology for patient's pain. Diverticulosis, 

without CT evidence of diverticulitis. Stable appearance of aortobiiliac 

stent graft.

 

[facility]

## 2018-08-29 NOTE — ED PHYSICIAN DOCUMENTATION
PD HPI ABD PAIN





- Stated complaint


Stated Complaint: ABD PAIN





- Chief complaint


Chief Complaint: Abd Pain





- History obtained from


History obtained from: Patient, EMS





- History of Present Illness


Timing - onset: How many weeks ago (1)


Timing - details: Gradual onset, Still present


Quality: Cramping, Fullness/distended


Location: Suprapubic


Associated symptoms: Constipation.  No: Fever, Nausea, Vomiting


Similar symptoms before: Diagnosis, Work up / diagnostics


Recently seen: Emergency Dept





- Additional information


Additional information: 





Patient is an 89 year old male with a history of bph who is presenting to the 

emergency department for suprapubic pain.  patient has had intermittent pain 

for the last week.  patient was started on bactrim a few weeks ago, but it didn'

t seem to help.  Patient then returned to the emergency department a few days 

ago with similar symptoms.  At that point patient was started on rocephin and 

keflex.  patient returned today because the suprapubic pain persisted.  





Review of Systems


Ten Systems: 10 systems reviewed and negative


Constitutional: denies: Fever, Chills


GI: reports: Abdominal Pain


: reports: Dysuria





PD PAST MEDICAL HISTORY





- Past Medical History


Past Medical History: Yes


Cardiovascular: Atrial fibrillation


Respiratory: None


Neuro: Dementia, CVA


Endocrine/Autoimmune: HyPOthyroidism


: Benign prostate hypertrophy


HEENT: Chronic hearing loss


Psych: None


Musculoskeletal: Osteoarthritis


Derm: None





- Past Surgical History


Past Surgical History: Yes


Ortho: Hip replacement





- Present Medications


Home Medications: 


 Ambulatory Orders











 Medication  Instructions  Recorded  Confirmed


 


Aspirin 81 mg PO DAILY 07/16/17 08/29/18


 


Tamsulosin HCl 0.4 mg PO DAILY 07/16/17 08/29/18


 


Zolpidem Tartrate 10 mg PO QPM PRN 07/16/17 08/29/18


 


Latanoprost 0.005% Ophth Drops 1 drops EACHEYE QPM 02/17/18 08/29/18





[Xalatan Ophth Drops]   


 


Timolol 0.5% Ophth Drops [Timoptic 1 drops EACHEYE BID 02/17/18 08/29/18





0.5% Ophth Drops]   


 


Dorzolamide HCl 1 drops EACHEYE TID 06/25/18 08/29/18


 


Doxazosin [Cardura] 4 mg PO QPM 06/25/18 08/29/18


 


Ipratropium Bromide 2 sprays TRAE BID 06/25/18 08/29/18


 


Levothyroxine Sodium [Synthroid] 100 mcg PO QDAC 07/07/18 08/29/18


 


Pilocarpine HCl [Pilocarpine HCl] 5 mg PO TID 07/07/18 08/29/18


 


Tramadol HCl 50 mg PO Q6H PRN 07/07/18 08/29/18


 


raNITIdine [Zantac] 150 mg PO DAILY 07/07/18 08/29/18


 


Cephalexin [Keflex] 500 mg PO QID #24 capsule 08/26/18 08/29/18


 


Docusate Sodium 100 mg PO DAILY #30 capsule 08/26/18 08/29/18


 


Phenazopyridine HCl [Pyridium] 200 mg PO TID PRN #6 tablet 08/29/18 














- Allergies


Allergies/Adverse Reactions: 


 Allergies











Allergy/AdvReac Type Severity Reaction Status Date / Time


 


No Known Drug Allergies Allergy   Verified 08/29/18 03:36














- Social History


Does the pt smoke?: No


Smoking Status: Never smoker


Does the pt drink ETOH?: No


Does the pt have substance abuse?: No





- Immunizations


Immunizations are current?: Yes





- POLST


Patient has POLST: No





PD ED PE NORMAL





- Vitals


Vital signs reviewed: Yes





- General


General: Alert and oriented X 3, No acute distress





- HEENT


HEENT: Atraumatic





- Cardiac


Cardiac: RRR





- Respiratory


Respiratory: No respiratory distress





- Abdomen


Abdomen: Soft





- Derm


Derm: Normal color, Warm and dry





- Extremities


Extremities: No deformity





- Neuro


Neuro: Alert and oriented X 3, No motor deficit, Normal speech


Eye Opening: Spontaneous





PD ED PE EXPANDED





- HEENT


HEENT: Dry mucous membranes





- Abdomen


Abdomen: Tender to palpation, Suprapubic.  No: Rebound, Guarding





Results





- Vitals


Vitals: 


 Vital Signs - 24 hr











  08/29/18





  03:20


 


Temperature 36.8 C


 


Heart Rate 72


 


Respiratory 16





Rate 


 


Blood Pressure 123/71


 


O2 Saturation 98








 Oxygen











O2 Source [With Activity]      Room air


 


O2 Source                      Room air

















- Labs


Labs: 


 Laboratory Tests











  08/29/18 08/29/18 08/29/18





  03:33 03:40 03:40


 


WBC   5.2 


 


RBC   2.96 L 


 


Hgb   8.9 L 


 


Hct   26.4 L 


 


MCV   89.2 


 


MCH   30.2 


 


MCHC   33.8 


 


RDW   14.3 


 


Plt Count   169 


 


MPV   6.9 L 


 


Neut # (Auto)   3.3 


 


Lymph # (Auto)   0.7 L 


 


Mono # (Auto)   0.7 


 


Eos # (Auto)   0.5 


 


Baso # (Auto)   0.0 


 


Absolute Nucleated RBC   0.00 


 


Nucleated RBC %   0.0 


 


Sodium    124 L


 


Potassium    3.7


 


Chloride    99 L


 


Carbon Dioxide    18 L


 


Anion Gap    7.0


 


BUN    18


 


Creatinine    1.8 H


 


Estimated GFR (MDRD)    36 L


 


Glucose    85


 


Calcium    8.6


 


Total Bilirubin    0.6


 


AST    18


 


ALT    15


 


Alkaline Phosphatase    58


 


Total Protein    5.2 L


 


Albumin    3.0 L


 


Globulin    2.2


 


Albumin/Globulin Ratio    1.4


 


Lipase    27


 


Urine Color  YELLOW  


 


Urine Clarity  CLEAR  


 


Urine pH  6.0  


 


Ur Specific Gravity  1.010  


 


Urine Protein  NEGATIVE  


 


Urine Glucose (UA)  NEGATIVE  


 


Urine Ketones  NEGATIVE  


 


Urine Occult Blood  SMALL H  


 


Urine Nitrite  NEGATIVE  


 


Urine Bilirubin  NEGATIVE  


 


Urine Urobilinogen  0.2 (NORMAL)  


 


Ur Leukocyte Esterase  NEGATIVE  


 


Urine RBC  11-25 H  


 


Urine WBC  0-3  


 


Ur Squamous Epith Cells  RARE Squamous  


 


Urine Bacteria  Few  


 


Ur Microscopic Review  INDICATED  


 


Urine Culture Comments  NOT INDICATED  














- Rads (name of study)


  ** ct abd pelvis


Radiology: Final report received (no acute intraabdominal findings.  patient's 

aortic stent was stable and unchanged)





PD MEDICAL DECISION MAKING





- ED course


Complexity details: reviewed old records, reviewed results, re-evaluated patient

, considered differential, d/w patient


ED course: 





Patient was seen and examined at bedside.  urine was collected and labs were 

drawn.  patient's previous results were reviewed and all of the cultures turned 

out to be negative.  Patients labs showed hyponatremia but not too far off 

patient's baseline.  Patient was treated with ns bolus.  Due to the urinalysis 

being persistently negative imaging was ordered.  When patient returned form 

imaging the results were reviewed.  there were no acute findings.  patients 

symptoms could be being caused by interstitial cystitis.  Patient was treated 

with pyridium and given dietary guidelines for discharge.  patient required no 

further inpatient work up and was stable for discharge with outpatient follow 

up.  





- Sepsis Event


Vital Signs: 


 Vital Signs - 24 hr











  08/29/18





  03:20


 


Temperature 36.8 C


 


Heart Rate 72


 


Respiratory 16





Rate 


 


Blood Pressure 123/71


 


O2 Saturation 98








 Oxygen











O2 Source [With Activity]      Room air


 


O2 Source                      Room air

















Departure





- Departure


Disposition: 01 Home, Self Care


Clinical Impression: 


 Cystitis





Condition: Good


Instructions:  ED UTI Cystitis Male


Follow-Up: 


Cesario Jay DO [Primary Care Provider] - Tomorrow


Prescriptions: 


Phenazopyridine HCl [Pyridium] 200 mg PO TID PRN #6 tablet


 PRN Reason: dysuria


Comments: 


Your diagnostics today were within normal limits.  there was no sign of urinary 

tract infection and all of your previous cultures did not grow out any 

bacteria.  You may have something called cystitis which is inflammation of your 

bladder.  You are being started on a medication that will turn your urine 

orange but might improve the pain.  You should refrain from eating spicy foods, 

and acidic foods.  You may need to see a urologist and you should follow up 

with your doctor for a referral for a urologist.  You may return to the 

emergency department at any time for new, worsening or uncontrollable symptoms.

## 2018-08-31 ENCOUNTER — HOSPITAL ENCOUNTER (OUTPATIENT)
Dept: HOSPITAL 76 - EMS | Age: 83
Discharge: ANOTHER HOSPITAL | End: 2018-08-31
Attending: SURGERY
Payer: MEDICARE

## 2018-08-31 ENCOUNTER — HOSPITAL ENCOUNTER (EMERGENCY)
Dept: HOSPITAL 76 - ED | Age: 83
Discharge: ANOTHER HOSPITAL | End: 2018-08-31
Payer: MEDICARE

## 2018-08-31 VITALS — DIASTOLIC BLOOD PRESSURE: 71 MMHG | SYSTOLIC BLOOD PRESSURE: 128 MMHG

## 2018-08-31 DIAGNOSIS — Z79.82: ICD-10-CM

## 2018-08-31 DIAGNOSIS — Z86.73: ICD-10-CM

## 2018-08-31 DIAGNOSIS — E03.9: ICD-10-CM

## 2018-08-31 DIAGNOSIS — F03.90: ICD-10-CM

## 2018-08-31 DIAGNOSIS — I21.4: Primary | ICD-10-CM

## 2018-08-31 DIAGNOSIS — R10.33: Primary | ICD-10-CM

## 2018-08-31 LAB
ALBUMIN DIAFP-MCNC: 3.3 G/DL (ref 3.2–5.5)
ALBUMIN/GLOB SERPL: 1.7 {RATIO} (ref 1–2.2)
ALP SERPL-CCNC: 55 IU/L (ref 42–121)
ALT SERPL W P-5'-P-CCNC: 17 IU/L (ref 10–60)
ANION GAP SERPL CALCULATED.4IONS-SCNC: 8 MMOL/L (ref 6–13)
AST SERPL W P-5'-P-CCNC: 20 IU/L (ref 10–42)
BASOPHILS NFR BLD AUTO: 0 10^3/UL (ref 0–0.1)
BASOPHILS NFR BLD AUTO: 0.5 %
BILIRUB BLD-MCNC: 1.2 MG/DL (ref 0.2–1)
BUN SERPL-MCNC: 17 MG/DL (ref 6–20)
CALCIUM UR-MCNC: 8.4 MG/DL (ref 8.5–10.3)
CHLORIDE SERPL-SCNC: 97 MMOL/L (ref 101–111)
CO2 SERPL-SCNC: 19 MMOL/L (ref 21–32)
CREAT SERPLBLD-SCNC: 1.8 MG/DL (ref 0.6–1.2)
EOSINOPHIL # BLD AUTO: 0.1 10^3/UL (ref 0–0.7)
EOSINOPHIL NFR BLD AUTO: 1.3 %
ERYTHROCYTE [DISTWIDTH] IN BLOOD BY AUTOMATED COUNT: 14.2 % (ref 12–15)
GFRSERPLBLD MDRD-ARVRAT: 36 ML/MIN/{1.73_M2} (ref 89–?)
GLOBULIN SER-MCNC: 2 G/DL (ref 2.1–4.2)
GLUCOSE SERPL-MCNC: 96 MG/DL (ref 70–100)
HGB UR QL STRIP: 9.1 G/DL (ref 14–18)
LIPASE SERPL-CCNC: 28 U/L (ref 22–51)
LYMPHOCYTES # SPEC AUTO: 0.8 10^3/UL (ref 1.5–3.5)
LYMPHOCYTES NFR BLD AUTO: 14.8 %
MCH RBC QN AUTO: 30.5 PG (ref 27–31)
MCHC RBC AUTO-ENTMCNC: 33.8 G/DL (ref 32–36)
MCV RBC AUTO: 90.1 FL (ref 80–94)
MONOCYTES # BLD AUTO: 0.7 10^3/UL (ref 0–1)
MONOCYTES NFR BLD AUTO: 13.1 %
NEUTROPHILS # BLD AUTO: 3.8 10^3/UL (ref 1.5–6.6)
NEUTROPHILS # SNV AUTO: 5.3 X10^3/UL (ref 4.8–10.8)
NEUTROPHILS NFR BLD AUTO: 70.3 %
PDW BLD AUTO: 6.6 FL (ref 7.4–11.4)
PLATELET # BLD: 202 10^3/UL (ref 130–450)
PROT SPEC-MCNC: 5.3 G/DL (ref 6.7–8.2)
RBC MAR: 2.98 10^6/UL (ref 4.7–6.1)
SODIUM SERPLBLD-SCNC: 124 MMOL/L (ref 135–145)

## 2018-08-31 PROCEDURE — 85025 COMPLETE CBC W/AUTO DIFF WBC: CPT

## 2018-08-31 PROCEDURE — 80053 COMPREHEN METABOLIC PANEL: CPT

## 2018-08-31 PROCEDURE — 71046 X-RAY EXAM CHEST 2 VIEWS: CPT

## 2018-08-31 PROCEDURE — 93005 ELECTROCARDIOGRAM TRACING: CPT

## 2018-08-31 PROCEDURE — 83690 ASSAY OF LIPASE: CPT

## 2018-08-31 PROCEDURE — 96365 THER/PROPH/DIAG IV INF INIT: CPT

## 2018-08-31 PROCEDURE — 36415 COLL VENOUS BLD VENIPUNCTURE: CPT

## 2018-08-31 PROCEDURE — 96375 TX/PRO/DX INJ NEW DRUG ADDON: CPT

## 2018-08-31 PROCEDURE — 99284 EMERGENCY DEPT VISIT MOD MDM: CPT

## 2018-08-31 PROCEDURE — 99285 EMERGENCY DEPT VISIT HI MDM: CPT

## 2018-08-31 PROCEDURE — 84484 ASSAY OF TROPONIN QUANT: CPT

## 2018-08-31 NOTE — ED PHYSICIAN DOCUMENTATION
PD HPI CHEST PAIN





- Stated complaint


Stated Complaint: ABD PAIN





- History obtained from


History obtained from: Patient, EMS





- History of Present Illness


Timing - onset: How many days ago (1-2)


Timing - details: Abrupt onset, Intermittant, Waxing and waning


Pain level max: 6


Pain level now: 3


Quality: Pain


Location: Substernal


Radiation: Abdominal


Improved by: Other (no ameliorating factors)


Worsened by: Other (no exacerbating factors)


Associated symptoms: Shortness of air, Nausea.  No: Diaphoresis


Recently seen: Emergency Dept





- Additional information


Additional information: 





patient was seen by PMD approximately 2 weeks ago for abdominal discomfort, 

diagnosed with UTI and started on Bactrim. He was T+R from this ED 8/26 for 

ongoing abdominal discomfort, abx. changed to keflex. He returned 8/29 for 

ongoing abdominal discomfort, pyridium was added to medications; on 8/29, he 

had blood tests and CT A/P with reassuring results that did not seem to yield a 

clear etiology of his discomfort. He returns via ambulance at this time due to c

/o pain, although he points to his midline mid/upper chest when I ask him where 

his pain is. He says this pain has been episodic for 1-2 days, although he says 

it radiates to abdomen as well. 





Review of Systems


Constitutional: reports: Reviewed and negative


Eyes: reports: Reviewed and negative


Ears: reports: Reviewed and negative


Nose: reports: Reviewed and negative


Throat: reports: Reviewed and negative


Cardiac: reports: Chest pain / pressure.  denies: Palpitations, Pedal edema


Respiratory: reports: Dyspnea.  denies: Cough


GI: reports: Abdominal Pain, Nausea.  denies: Abdominal Swelling, Vomiting, 

Constipation, Diarrhea


: reports: Frequency.  denies: Dysuria, Hematuria


Skin: reports: Reviewed and negative


Musculoskeletal: reports: Reviewed and negative


Neurologic: reports: Reviewed and negative





PD PAST MEDICAL HISTORY





- Past Medical History


Cardiovascular: Atrial fibrillation


Respiratory: None


Neuro: Dementia, CVA


Endocrine/Autoimmune: HyPOthyroidism


: Benign prostate hypertrophy


HEENT: Chronic hearing loss


Psych: None


Musculoskeletal: Osteoarthritis


Derm: None





- Past Surgical History


Past Surgical History: Yes


Ortho: Hip replacement





- Present Medications


Home Medications: 


 Ambulatory Orders











 Medication  Instructions  Recorded  Confirmed


 


Aspirin 81 mg PO DAILY 07/16/17 08/29/18


 


Tamsulosin HCl 0.4 mg PO DAILY 07/16/17 08/29/18


 


Zolpidem Tartrate 10 mg PO QPM PRN 07/16/17 08/29/18


 


Latanoprost 0.005% Ophth Drops 1 drops EACHEYE QPM 02/17/18 08/29/18





[Xalatan Ophth Drops]   


 


Timolol 0.5% Ophth Drops [Timoptic 1 drops EACHEYE BID 02/17/18 08/29/18





0.5% Ophth Drops]   


 


Dorzolamide HCl 1 drops EACHEYE TID 06/25/18 08/29/18


 


Doxazosin [Cardura] 4 mg PO QPM 06/25/18 08/29/18


 


Ipratropium Bromide 2 sprays TRAE BID 06/25/18 08/29/18


 


Levothyroxine Sodium [Synthroid] 100 mcg PO QDAC 07/07/18 08/29/18


 


Pilocarpine HCl [Pilocarpine HCl] 5 mg PO TID 07/07/18 08/29/18


 


Tramadol HCl 50 mg PO Q6H PRN 07/07/18 08/29/18


 


raNITIdine [Zantac] 150 mg PO DAILY 07/07/18 08/29/18


 


Cephalexin [Keflex] 500 mg PO QID #24 capsule 08/26/18 08/29/18


 


Docusate Sodium 100 mg PO DAILY #30 capsule 08/26/18 08/29/18


 


Phenazopyridine HCl [Pyridium] 200 mg PO TID PRN #6 tablet 08/29/18 














- Allergies


Allergies/Adverse Reactions: 


 Allergies











Allergy/AdvReac Type Severity Reaction Status Date / Time


 


No Known Drug Allergies Allergy   Verified 08/31/18 05:51














- Social History


Does the pt smoke?: No


Smoking Status: Never smoker


Does the pt drink ETOH?: No


Does the pt have substance abuse?: No





- Immunizations


Immunizations are current?: Yes





- POLST


Patient has POLST: No





PD ED PE NORMAL





- Vitals


Vital signs reviewed: Yes





- General


General: Alert and oriented X 3, No acute distress, Well developed/nourished, 

Other ([place])





- HEENT


HEENT: Moist mucous membranes





- Neck


Neck: Supple, no meningeal sign





- Cardiac


Cardiac: RRR, No murmur





- Respiratory


Respiratory: No respiratory distress, Clear bilaterally





- Abdomen


Abdomen: Normal bowel sounds, Soft, Non tender, Non distended





- Back


Back: Other (left CVA tenderness)





- Derm


Derm: Normal color, Warm and dry, No rash





- Extremities


Extremities: No edema





- Neuro


Neuro: Alert and oriented X 3, No motor deficit, No sensory deficit





Results





- Vitals


Vitals: 


 Vital Signs - 24 hr











  08/31/18 08/31/18 08/31/18





  05:46 07:13 08:59


 


Temperature 36.4 C L  36.4 C L


 


Heart Rate 84 74 93


 


Respiratory 14 14 18





Rate   


 


Blood Pressure 123/76 124/62 128/71


 


O2 Saturation 96 100 95








 Oxygen











O2 Source [With Activity]      Room air


 


O2 Source                      Room air

















- EKG (time done)


  ** No standard instances


Rate: Rate (enter#) (75)


Rhythm: NSR, Other (PVCs)


Axis: Normal


Intervals: Normal TX


QRS: LVH


Ischemia: Normal ST segments





- Labs


Labs: 


 Laboratory Tests











  08/31/18 08/31/18 08/31/18





  06:05 06:05 06:05


 


WBC  5.3  


 


RBC  2.98 L  


 


Hgb  9.1 L  


 


Hct  26.8 L  


 


MCV  90.1  


 


MCH  30.5  


 


MCHC  33.8  


 


RDW  14.2  


 


Plt Count  202  


 


MPV  6.6 L  


 


Neut # (Auto)  3.8  


 


Lymph # (Auto)  0.8 L  


 


Mono # (Auto)  0.7  


 


Eos # (Auto)  0.1  


 


Baso # (Auto)  0.0  


 


Absolute Nucleated RBC  0.00  


 


Nucleated RBC %  0.0  


 


Sodium   124 L 


 


Potassium   3.8 


 


Chloride   97 L 


 


Carbon Dioxide   19 L 


 


Anion Gap   8.0 


 


BUN   17 


 


Creatinine   1.8 H 


 


Estimated GFR (MDRD)   36 L 


 


Glucose   96 


 


Calcium   8.4 L 


 


Total Bilirubin   1.2 H 


 


AST   20 


 


ALT   17 


 


Alkaline Phosphatase   55 


 


Troponin I    0.56 H*


 


Total Protein   5.3 L 


 


Albumin   3.3 


 


Globulin   2.0 L 


 


Albumin/Globulin Ratio   1.7 


 


Lipase   28 














- Rads (name of study)


  ** chest xray


Radiology: Prelim report reviewed, See rad report





PD MEDICAL DECISION MAKING





- ED course


Complexity details: reviewed old records, reviewed results, re-evaluated patient

, considered differential, d/w patient


ED course: 





elevated troponin without ST changes, c/w NSTEMI. On reevaluation, after tests 

resulted, he is pain-free, and remained pain-free for remainder of ED stay. 

[facility] have no beds available. D/W Dr. Yu (cardiology on-call 

at [facility]), accepts patient for transfer. He recommends ASA 

and heparin before transfering. Patient updated and comfortable with transfer.





- Sepsis Event


Vital Signs: 


 Vital Signs - 24 hr











  08/31/18 08/31/18 08/31/18





  05:46 07:13 08:59


 


Temperature 36.4 C L  36.4 C L


 


Heart Rate 84 74 93


 


Respiratory 14 14 18





Rate   


 


Blood Pressure 123/76 124/62 128/71


 


O2 Saturation 96 100 95








 Oxygen











O2 Source [With Activity]      Room air


 


O2 Source                      Room air

















Departure





- Departure


Disposition: 02 Transfer Acute Care Hosp


Clinical Impression: 


 NSTEMI (non-ST elevated myocardial infarction)





Condition: Stable

## 2018-08-31 NOTE — XRAY REPORT
Reason:  chest pain

Procedure Date:  08/31/2018   

Accession Number:  [id number] / [id number]                    

Procedure:  XR  - Chest 2 View X-Ray CPT Code:  20012

 

FULL RESULT:

 

 

EXAM:

CHEST RADIOGRAPHY

 

EXAM DATE: 8/31/2018 06:45 AM.

 

CLINICAL HISTORY: Chest pain.

 

COMPARISON: 06/10/2018.

 

TECHNIQUE: 2 views.

 

FINDINGS:

Lungs/Pleura: Retrocardiac infiltrate and small left effusion. No 

pneumothorax.

 

Mediastinum: Heart and mediastinal contours are unremarkable.

 

Other: None.

IMPRESSION: Retrocardiac infiltrate and small left effusion.

 

[facility]

## 2018-09-22 ENCOUNTER — HOSPITAL ENCOUNTER (OUTPATIENT)
Dept: HOSPITAL 76 - ED | Age: 83
Setting detail: OBSERVATION
LOS: 1 days | Discharge: HOME | End: 2018-09-23
Attending: NURSE PRACTITIONER | Admitting: NURSE PRACTITIONER
Payer: MEDICARE

## 2018-09-22 ENCOUNTER — HOSPITAL ENCOUNTER (OUTPATIENT)
Dept: HOSPITAL 76 - EMS | Age: 83
Discharge: ANOTHER HOSPITAL | End: 2018-09-22
Attending: SURGERY
Payer: MEDICARE

## 2018-09-22 DIAGNOSIS — R10.30: Primary | ICD-10-CM

## 2018-09-22 DIAGNOSIS — I25.2: ICD-10-CM

## 2018-09-22 DIAGNOSIS — I50.42: ICD-10-CM

## 2018-09-22 DIAGNOSIS — K57.30: ICD-10-CM

## 2018-09-22 DIAGNOSIS — R13.10: ICD-10-CM

## 2018-09-22 DIAGNOSIS — N17.9: ICD-10-CM

## 2018-09-22 DIAGNOSIS — N18.9: ICD-10-CM

## 2018-09-22 DIAGNOSIS — R91.8: ICD-10-CM

## 2018-09-22 DIAGNOSIS — D64.9: ICD-10-CM

## 2018-09-22 DIAGNOSIS — R10.9: Primary | ICD-10-CM

## 2018-09-22 DIAGNOSIS — I13.0: ICD-10-CM

## 2018-09-22 DIAGNOSIS — N39.0: ICD-10-CM

## 2018-09-22 DIAGNOSIS — N40.0: ICD-10-CM

## 2018-09-22 DIAGNOSIS — E87.1: ICD-10-CM

## 2018-09-22 DIAGNOSIS — F03.90: ICD-10-CM

## 2018-09-22 DIAGNOSIS — E03.9: ICD-10-CM

## 2018-09-22 DIAGNOSIS — I48.91: ICD-10-CM

## 2018-09-22 DIAGNOSIS — Z86.73: ICD-10-CM

## 2018-09-22 LAB
ALBUMIN DIAFP-MCNC: 4.1 G/DL (ref 3.2–5.5)
ALBUMIN/GLOB SERPL: 1.4 {RATIO} (ref 1–2.2)
ALP SERPL-CCNC: 63 IU/L (ref 42–121)
ALT SERPL W P-5'-P-CCNC: 18 IU/L (ref 10–60)
ANION GAP SERPL CALCULATED.4IONS-SCNC: 10 MMOL/L (ref 6–13)
AST SERPL W P-5'-P-CCNC: 26 IU/L (ref 10–42)
BASOPHILS NFR BLD AUTO: 0.1 10^3/UL (ref 0–0.1)
BASOPHILS NFR BLD AUTO: 1.1 %
BILIRUB BLD-MCNC: 0.8 MG/DL (ref 0.2–1)
BUN SERPL-MCNC: 26 MG/DL (ref 6–20)
CALCIUM UR-MCNC: 9.2 MG/DL (ref 8.5–10.3)
CHLORIDE SERPL-SCNC: 102 MMOL/L (ref 101–111)
CLARITY UR REFRACT.AUTO: (no result)
CO2 SERPL-SCNC: 19 MMOL/L (ref 21–32)
CREAT SERPLBLD-SCNC: 1.8 MG/DL (ref 0.6–1.2)
EOSINOPHIL # BLD AUTO: 0 10^3/UL (ref 0–0.7)
EOSINOPHIL NFR BLD AUTO: 0.3 %
ERYTHROCYTE [DISTWIDTH] IN BLOOD BY AUTOMATED COUNT: 14.6 % (ref 12–15)
GFRSERPLBLD MDRD-ARVRAT: 36 ML/MIN/{1.73_M2} (ref 89–?)
GLOBULIN SER-MCNC: 3 G/DL (ref 2.1–4.2)
GLUCOSE SERPL-MCNC: 93 MG/DL (ref 70–100)
GLUCOSE UR QL STRIP.AUTO: NEGATIVE MG/DL
HGB UR QL STRIP: 11.7 G/DL (ref 14–18)
INR PPP: 1.1 (ref 0.8–1.2)
KETONES UR QL STRIP.AUTO: (no result) MG/DL
LIPASE SERPL-CCNC: 27 U/L (ref 22–51)
LYMPHOCYTES # SPEC AUTO: 1.1 10^3/UL (ref 1.5–3.5)
LYMPHOCYTES NFR BLD AUTO: 15.2 %
MCH RBC QN AUTO: 31.7 PG (ref 27–31)
MCHC RBC AUTO-ENTMCNC: 34 G/DL (ref 32–36)
MCV RBC AUTO: 93.3 FL (ref 80–94)
MONOCYTES # BLD AUTO: 0.7 10^3/UL (ref 0–1)
MONOCYTES NFR BLD AUTO: 9.3 %
NEUTROPHILS # BLD AUTO: 5.2 10^3/UL (ref 1.5–6.6)
NEUTROPHILS # SNV AUTO: 7 X10^3/UL (ref 4.8–10.8)
NEUTROPHILS NFR BLD AUTO: 74.1 %
NITRITE UR QL STRIP.AUTO: NEGATIVE
PDW BLD AUTO: 7.2 FL (ref 7.4–11.4)
PH UR STRIP.AUTO: 7.5 PH (ref 5–7.5)
PLATELET # BLD: 254 10^3/UL (ref 130–450)
PROT SPEC-MCNC: 7.1 G/DL (ref 6.7–8.2)
PROT UR STRIP.AUTO-MCNC: (no result) MG/DL
PROTHROM ACT/NOR PPP: 12.8 SECS (ref 9.9–12.6)
RBC # UR STRIP.AUTO: NEGATIVE /UL
RBC # URNS HPF: (no result) /HPF (ref 0–5)
RBC MAR: 3.69 10^6/UL (ref 4.7–6.1)
SODIUM SERPLBLD-SCNC: 131 MMOL/L (ref 135–145)
SP GR UR STRIP.AUTO: 1.01 (ref 1–1.03)
SQUAMOUS URNS QL MICRO: (no result)
UROBILINOGEN UR QL STRIP.AUTO: (no result) E.U./DL
UROBILINOGEN UR STRIP.AUTO-MCNC: NEGATIVE MG/DL

## 2018-09-22 PROCEDURE — 36415 COLL VENOUS BLD VENIPUNCTURE: CPT

## 2018-09-22 PROCEDURE — 80053 COMPREHEN METABOLIC PANEL: CPT

## 2018-09-22 PROCEDURE — 84466 ASSAY OF TRANSFERRIN: CPT

## 2018-09-22 PROCEDURE — 82270 OCCULT BLOOD FECES: CPT

## 2018-09-22 PROCEDURE — 87040 BLOOD CULTURE FOR BACTERIA: CPT

## 2018-09-22 PROCEDURE — 97161 PT EVAL LOW COMPLEX 20 MIN: CPT

## 2018-09-22 PROCEDURE — 82607 VITAMIN B-12: CPT

## 2018-09-22 PROCEDURE — 96361 HYDRATE IV INFUSION ADD-ON: CPT

## 2018-09-22 PROCEDURE — 81001 URINALYSIS AUTO W/SCOPE: CPT

## 2018-09-22 PROCEDURE — 87086 URINE CULTURE/COLONY COUNT: CPT

## 2018-09-22 PROCEDURE — 85025 COMPLETE CBC W/AUTO DIFF WBC: CPT

## 2018-09-22 PROCEDURE — 83540 ASSAY OF IRON: CPT

## 2018-09-22 PROCEDURE — 85610 PROTHROMBIN TIME: CPT

## 2018-09-22 PROCEDURE — 85014 HEMATOCRIT: CPT

## 2018-09-22 PROCEDURE — 74176 CT ABD & PELVIS W/O CONTRAST: CPT

## 2018-09-22 PROCEDURE — 82728 ASSAY OF FERRITIN: CPT

## 2018-09-22 PROCEDURE — 96372 THER/PROPH/DIAG INJ SC/IM: CPT

## 2018-09-22 PROCEDURE — 96375 TX/PRO/DX INJ NEW DRUG ADDON: CPT

## 2018-09-22 PROCEDURE — 93005 ELECTROCARDIOGRAM TRACING: CPT

## 2018-09-22 PROCEDURE — 96376 TX/PRO/DX INJ SAME DRUG ADON: CPT

## 2018-09-22 PROCEDURE — 96366 THER/PROPH/DIAG IV INF ADDON: CPT

## 2018-09-22 PROCEDURE — 84443 ASSAY THYROID STIM HORMONE: CPT

## 2018-09-22 PROCEDURE — 84484 ASSAY OF TROPONIN QUANT: CPT

## 2018-09-22 PROCEDURE — 85018 HEMOGLOBIN: CPT

## 2018-09-22 PROCEDURE — 83605 ASSAY OF LACTIC ACID: CPT

## 2018-09-22 PROCEDURE — 83690 ASSAY OF LIPASE: CPT

## 2018-09-22 PROCEDURE — 81003 URINALYSIS AUTO W/O SCOPE: CPT

## 2018-09-22 PROCEDURE — 83615 LACTATE (LD) (LDH) ENZYME: CPT

## 2018-09-22 PROCEDURE — 83735 ASSAY OF MAGNESIUM: CPT

## 2018-09-22 PROCEDURE — 96365 THER/PROPH/DIAG IV INF INIT: CPT

## 2018-09-22 PROCEDURE — 93306 TTE W/DOPPLER COMPLETE: CPT

## 2018-09-22 PROCEDURE — 85044 MANUAL RETICULOCYTE COUNT: CPT

## 2018-09-22 PROCEDURE — 99284 EMERGENCY DEPT VISIT MOD MDM: CPT

## 2018-09-22 RX ADMIN — TIMOLOL MALEATE SCH DROPS: 5 SOLUTION OPHTHALMIC at 21:29

## 2018-09-22 RX ADMIN — SODIUM CHLORIDE SCH MG: 9 INJECTION, SOLUTION INTRAVENOUS at 14:07

## 2018-09-22 RX ADMIN — CARVEDILOL SCH MG: 3.12 TABLET, FILM COATED ORAL at 20:27

## 2018-09-22 RX ADMIN — SODIUM CHLORIDE, PRESERVATIVE FREE SCH: 5 INJECTION INTRAVENOUS at 16:20

## 2018-09-22 NOTE — ED PHYSICIAN DOCUMENTATION
History of Present Illness





- Stated complaint


Stated Complaint: LOWER ABD PX





- Chief complaint


Chief Complaint: Abd Pain





- Additonal information


Additional information: 





hx from pt


89 male


BIBA for severe abd pain


X few days worse now


feels cold no fever


no NVD


last BM unknown


hx is very difficult and pt is confused


seems pt was seen by Dr Franz and rx bactrim which he has only taken maybe two 

doses of


do not know what tests were run or what results were or what dx was


pt does not know if he has had prior surgery


per EMS he has a hx afib but unknown what meds he takes for this - per chart 

only on asa for a fib





Review of Systems


Constitutional: reports: Chills.  denies: Fever


Throat: denies: Sore throat


Cardiac: denies: Chest pain / pressure


Respiratory: denies: Dyspnea, Cough


GI: reports: Abdominal Pain.  denies: Nausea, Vomiting, Diarrhea


: denies: Dysuria


Neurologic: reports: Confused (per chart hx dementia)


Endocrine: denies: Easy bruising / bleeding





PD PAST MEDICAL HISTORY





- Past Medical History


Cardiovascular: Atrial fibrillation


Respiratory: None


Neuro: Dementia, CVA


Endocrine/Autoimmune: HyPOthyroidism


GI: None


: Benign prostate hypertrophy


HEENT: Chronic hearing loss


Psych: None


Musculoskeletal: Osteoarthritis


Derm: None





- Past Surgical History


Past Surgical History: Yes


Ortho: Hip replacement





- Present Medications


Home Medications: 


                                Ambulatory Orders











 Medication  Instructions  Recorded  Confirmed


 


Aspirin 81 mg PO DAILY 07/16/17 09/22/18


 


Tamsulosin HCl 0.4 mg PO DAILY 07/16/17 09/22/18


 


Zolpidem Tartrate 10 mg PO QPM PRN 07/16/17 09/22/18


 


Latanoprost 0.005% Ophth Drops 1 drops EACHEYE QPM 02/17/18 09/22/18





[Xalatan Ophth Drops]   


 


Timolol 0.5% Ophth Drops [Timoptic 1 drops EACHEYE BID 02/17/18 09/22/18





0.5% Ophth Drops]   


 


Doxazosin [Cardura] 4 mg PO QPM 06/25/18 09/22/18


 


Levothyroxine Sodium [Synthroid] 100 mcg PO QDAC 07/07/18 09/22/18


 


Pilocarpine HCl 5 mg PO TID 07/07/18 09/22/18


 


Tramadol HCl 50 mg PO Q6H PRN 07/07/18 09/22/18


 


raNITIdine [Zantac] 150 mg PO DAILY 07/07/18 09/22/18


 


Docusate Sodium 100 mg PO DAILY #30 capsule 08/26/18 09/22/18


 


Phenazopyridine HCl [Pyridium] 200 mg PO TID PRN #6 tablet 08/29/18 09/22/18


 


Atorvastatin Calcium 40 mg PO QPM 09/22/18 09/22/18


 


Carvedilol 3.125 mg PO BID 09/22/18 09/22/18


 


Fluticasone [Flonase] 1 sprays TRAE DAILY 09/22/18 09/22/18


 


Furosemide 20 mg PO DAILY 09/22/18 09/22/18


 


Lisinopril 5 mg PO DAILY 09/22/18 09/22/18














- Allergies


Allergies/Adverse Reactions: 


                                    Allergies











Allergy/AdvReac Type Severity Reaction Status Date / Time


 


No Known Drug Allergies Allergy   Verified 09/22/18 08:19














- Social History


Does the pt smoke?: No


Smoking Status: Never smoker


Does the pt drink ETOH?: No


Does the pt have substance abuse?: No





- Immunizations


Immunizations are current?: Yes





- POLST


Patient has POLST: No





PD ED PE NORMAL





- Vitals


Vital signs reviewed: Yes





- Neck


Neck: Supple, no meningeal sign





- Cardiac


Cardiac: RRR





- Respiratory


Respiratory: No respiratory distress, Clear bilaterally





- Abdomen


Abdomen: Other (+ BS, no priro surgical scars identified, severe TTP and rigid, 

no pulsatile mass)





- Male 


Male : Other (no hernia)





- Neuro


Neuro: Alert and oriented X 3





Results





- Vitals


Vitals: 


                               Vital Signs - 24 hr











  09/22/18 09/22/18





  08:14 08:45


 


Temperature 36.4 C L 


 


Heart Rate 63 63


 


Respiratory 16 16





Rate  


 


Blood Pressure 144/62 H 137/66 H


 


O2 Saturation 94 100








                                     Oxygen











O2 Source [With Activity]      Room air


 


O2 Source                      Room air

















- Labs


Labs: 


                                Laboratory Tests











  09/22/18 09/22/18 09/22/18





  08:28 08:28 08:28


 


WBC  7.0  


 


RBC  3.69 L  


 


Hgb  11.7 L  


 


Hct  34.4 L  


 


MCV  93.3  


 


MCH  31.7 H  


 


MCHC  34.0  


 


RDW  14.6  


 


Plt Count  254  


 


MPV  7.2 L  


 


Neut # (Auto)  5.2  


 


Lymph # (Auto)  1.1 L  


 


Mono # (Auto)  0.7  


 


Eos # (Auto)  0.0  


 


Baso # (Auto)  0.1  


 


Absolute Nucleated RBC  0.00  


 


Nucleated RBC %  0.0  


 


PT    12.8 H


 


INR    1.1


 


Sodium   131 L 


 


Potassium   4.1 


 


Chloride   102 


 


Carbon Dioxide   19 L 


 


Anion Gap   10.0 


 


BUN   26 H 


 


Creatinine   1.8 H 


 


Estimated GFR (MDRD)   36 L 


 


Glucose   93 


 


Lactic Acid   


 


Calcium   9.2 


 


Total Bilirubin   0.8 


 


AST   26 


 


ALT   18 


 


Alkaline Phosphatase   63 


 


Total Protein   7.1 


 


Albumin   4.1 


 


Globulin   3.0 


 


Albumin/Globulin Ratio   1.4 


 


Lipase   27 


 


Urine Color   


 


Urine Clarity   


 


Urine pH   


 


Ur Specific Gravity   


 


Urine Protein   


 


Urine Glucose (UA)   


 


Urine Ketones   


 


Urine Occult Blood   


 


Urine Nitrite   


 


Urine Bilirubin   


 


Urine Urobilinogen   


 


Ur Leukocyte Esterase   


 


Urine RBC   


 


Urine WBC   


 


Ur Squamous Epith Cells   


 


Urine Bacteria   


 


Ur Microscopic Review   


 


Urine Culture Comments   














  09/22/18 09/22/18





  08:34 10:08


 


WBC  


 


RBC  


 


Hgb  


 


Hct  


 


MCV  


 


MCH  


 


MCHC  


 


RDW  


 


Plt Count  


 


MPV  


 


Neut # (Auto)  


 


Lymph # (Auto)  


 


Mono # (Auto)  


 


Eos # (Auto)  


 


Baso # (Auto)  


 


Absolute Nucleated RBC  


 


Nucleated RBC %  


 


PT  


 


INR  


 


Sodium  


 


Potassium  


 


Chloride  


 


Carbon Dioxide  


 


Anion Gap  


 


BUN  


 


Creatinine  


 


Estimated GFR (MDRD)  


 


Glucose  


 


Lactic Acid  1.5 


 


Calcium  


 


Total Bilirubin  


 


AST  


 


ALT  


 


Alkaline Phosphatase  


 


Total Protein  


 


Albumin  


 


Globulin  


 


Albumin/Globulin Ratio  


 


Lipase  


 


Urine Color   YELLOW


 


Urine Clarity   HAZY


 


Urine pH   7.5


 


Ur Specific Gravity   1.015


 


Urine Protein   TRACE


 


Urine Glucose (UA)   NEGATIVE


 


Urine Ketones   TRACE


 


Urine Occult Blood   NEGATIVE


 


Urine Nitrite   NEGATIVE


 


Urine Bilirubin   NEGATIVE


 


Urine Urobilinogen   1 (NORMAL)


 


Ur Leukocyte Esterase   MODERATE H


 


Urine RBC   0-5


 


Urine WBC   11-25 H


 


Ur Squamous Epith Cells   RARE Squamous


 


Urine Bacteria   Few


 


Ur Microscopic Review   INDICATED


 


Urine Culture Comments   INDICATED














- Rads (name of study)


  ** CT AP non con 2/2 GFR


Radiology: See rad report (no acute process, diverticulosis, prior SMA and 

aortibil stent)





PD MEDICAL DECISION MAKING





- ED course


ED course: 





CT looks like pt had a recent CT with oral contrast - also appears to have had 

prior AAA repair





but per rad no acute process on todays CT





UA is +





so looks like UTI that failed outpt tx with septra (though unclear if pt 

actually taking the meds as pt cannot remember)





EMS very concerned about pt level of confusion about his meds etc





apparently he lives at home with his wife -  per EMS she seemed confused too





I do not know his baseline state but he certainly is not able to go home as 

confused as this and be expected to take his med correctly , follow up etc





will req hospitalist admit for UTI AMS failed outpt 





gave rocephin





suggest SW consult





if wife does not arrive in ED perhaps send out a welfare check to make sure she 

is OK too - EMS was concerned





spoke with Dr Shankar at 1140





1300 wife is here now and I went to speak with her - she seems very put together

and knows all the details the pt was not able to provide - she also states he 

had similar severe pain from bowel ischemia before he had his vascular stents 

placed - right now his GFR is too low for angiogram but will order 2 L of fluid 

and spoke to Dr Shankar to recommend GF recheck and angio if at all possible








- Sepsis Event


Vital Signs: 


                               Vital Signs - 24 hr











  09/22/18 09/22/18





  08:14 08:45


 


Temperature 36.4 C L 


 


Heart Rate 63 63


 


Respiratory 16 16





Rate  


 


Blood Pressure 144/62 H 137/66 H


 


O2 Saturation 94 100








                                     Oxygen











O2 Source [With Activity]      Room air


 


O2 Source                      Room air

















Departure





- Departure


Disposition: [facility] DC/Xfer


Clinical Impression: 


 Mental confusion, Renal insufficiency





UTI (urinary tract infection)


Qualifiers:


 Urinary tract infection type: site unspecified Hematuria presence: without 

hematuria Qualified Code(s): N39.0 - Urinary tract infection, site not specified





Abdominal pain


Qualifiers:


 Abdominal location: lower abdomen, unspecified Qualified Code(s): R10.30 - 

Lower abdominal pain, unspecified





Condition: Fair


Discharge Date/Time: 09/22/18 13:52

## 2018-09-22 NOTE — HISTORY & PHYSICAL EXAMINATION
Chief Complaint





- Chief Complaint


Chief Complaint: abdominal pain





History of Present Illness





- History of Present Illness


HPI Comment/Other: 


Mr. Garcia is 89-yrs-old male with a PMH significance for Dementia, combined 

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 2018, CVA, a-

Fib, HTN, BPH, hypothyoirism, status post AAA repair, both abdominal and sigmoid

diverticulosis with chronic abdominal discomfort and pain, dysphagia, ,CKD, pu

lmonary nodules with six-month followup recommended, chronic back pain, who 

present ER complaints of abdominal pain. Pt had elevated troponin with NSTEM on 

8/31/18. He was transferred to [facility] for further evaluation and 

treatment. We will request the medical records for that. pt's wife is in the 

bedside as well. Both Pt and wife can only provide limited information due to 

their dementia.  Pt report had diffused abdominal pain beginning today. Now he 

report his abdominal pain becomes better. pt report he saw his PCP and 

prescribed bactrim for his UTI. He report he took two dosage of bactrim. I 

calculate the rest is 8 pills. it is correct pill number. pt's wife report she 

did a good care of his , and is willing to continue to take care of his 

. Pt also state he does not want to go to any nurse facility. Pt denies 

nausea, vomiting, diarrhea, fever, chill, chest pain, shortness of breath, 

cough. CT of abdomen reveals no acute abnormality. UA reveals possible UTI. pt 

is afebrile and hemodynamic stable in ER. pt is admitted in observation for 

further evaluation and treatment. 








History





- Past Medical History


Cardiovascular: reports: Atrial fibrillation


Respiratory: reports: None


Neuro: reports: Dementia, CVA


Endocrine/Autoimmune: reports: HyPOthyroidism


GI: reports: None


: reports: Benign prostate hypertrophy


HEENT: reports: Chronic hearing loss


Psych: reports: None


Musculoskeletal: reports: Osteoarthritis


Derm: reports: None


MRSA Hx?: No





- Past Surgical History


Ortho: reports: Hip replacement





- Family & Social History


Family History Comment/Other: pt report he is living with his wife at [place]

in his own home. He had three children.


Living arrangement: At home


Living Situation: With spouse/s.o.





- POLST


Patient has POLST: No


POLST Status: DNR





Meds/Allgy





- Home Medications


Home Medications: 


                                Ambulatory Orders











 Medication  Instructions  Recorded  Confirmed


 


Aspirin 81 mg PO DAILY 07/16/17 09/22/18


 


Tamsulosin HCl 0.4 mg PO DAILY 07/16/17 09/22/18


 


Zolpidem Tartrate 10 mg PO QPM PRN 07/16/17 09/22/18


 


Latanoprost 0.005% Ophth Drops 1 drops EACHEYE QPM 02/17/18 09/22/18





[Xalatan Ophth Drops]   


 


Timolol 0.5% Ophth Drops [Timoptic 1 drops EACHEYE BID 02/17/18 09/22/18





0.5% Ophth Drops]   


 


Doxazosin [Cardura] 4 mg PO QPM 06/25/18 09/22/18


 


Levothyroxine Sodium [Synthroid] 100 mcg PO QDAC 07/07/18 09/22/18


 


Pilocarpine HCl 5 mg PO TID 07/07/18 09/22/18


 


Tramadol HCl 50 mg PO Q6H PRN 07/07/18 09/22/18


 


raNITIdine [Zantac] 150 mg PO DAILY 07/07/18 09/22/18


 


Docusate Sodium 100 mg PO DAILY #30 capsule 08/26/18 09/22/18


 


Phenazopyridine HCl [Pyridium] 200 mg PO TID PRN #6 tablet 08/29/18 09/22/18


 


Atorvastatin Calcium 40 mg PO QPM 09/22/18 09/22/18


 


Carvedilol 3.125 mg PO BID 09/22/18 09/22/18


 


Fluticasone [Flonase] 1 sprays TRAE DAILY 09/22/18 09/22/18


 


Furosemide 20 mg PO DAILY 09/22/18 09/22/18


 


Lisinopril 5 mg PO DAILY 09/22/18 09/22/18














- Allergies


Allergies/Adverse Reactions: 


                                    Allergies











Allergy/AdvReac Type Severity Reaction Status Date / Time


 


No Known Drug Allergies Allergy   Verified 09/22/18 08:19














Review of Systems





- Constitutional


Constitutional: denies: Fatigue, Fever, Chills, Malaise, Weakness, Poor 

appetite, Diaphoresis, Night sweats, Weight gain, Weight loss





- Eyes


Eyes: denies: Pain, Irritation, Amaurosis, Blurred vision, Spots in vision, 

Field loss, Vision loss, Dipolpia





- Ears, Nose & Throat


Ears, Nose & Throat: denies: Ear pain, Hearing loss, Hearing aids, Tinnitus, 

Vertigo, Nasal pain, Nasal discharge, Nosebleeds, Nasal obstruction, Nasal 

congestion, Postnasal drainage, Dentures, Sore throat, Hoarseness, Mouth 

lesions, Bleeding gums





- Cardiovascular


Cariovascular: denies: Irregular heart rate, Palpitations, Chest pain, Edema, 

Lightheadedness, Syncope, Exertional dyspnea, Decr. exercise tolerance





- Respiratory


Respiratory: denies: Cough, Sputum production, Wheezing, Snoring, Hemoptysis, 

Orthopnea, SOB at rest, SOB with exertion





- Gastrointestinal


Gastrointestinal: reports: Abdominal pain.  denies: Abdominal distention, 

Constipation, Diarrhea, Change in bowel habits, Rectal bleeding, Black stools, 

Bloody stools, Nausea, Vomiting, Bile emesis, Josh blood emesis, Coffee grounds

 emesis, Reflux/heartburn





- Genitourinary


Genitourinary: denies: Dysuria, Frequency, Urgency, Hematuria, Incontinence, 

Flank pain, Nocturia, Urethral discharge





- Musculoskeletal


Musculoskeletal: denies: Muscle pain, Back pain, Muscle aches, Stiffness, 

Limited range of motion, Muscle weakness, Gout, Joint pain





- Integumentary


Integumentary: denies: Rash, Pruritis, Lesions, Dryness, Lumps, Acne, Pigment 

changes, Nail changes





- Neurological


Neurological: denies: General weakness, Focal weakness, Headache, Dizziness, 

Numbness, Memory problems, Pre-existing deficit, Abnormal gait





- Psychiatric


Psychiatric: denies: Depression, Anxiety, Suicidal, Delusions, Hallucinations, 

Homicidal





- Endocrine


Endocrine: denies: Polyuria, Polydypsia, Polyphagia, Intolerance to cold





- Hematologic/Lymphatic


Hematologic/Lymphatic: denies: Anemia, Bruising, Petechiae, Blood clots, 

Lymphadenopathy, Bleeding tendencies





Exam





- Vital Signs


Reviewed Vital Signs: Yes


Vital Signs: 





                                Vital Signs x48h











  Temp Pulse Resp BP Pulse Ox


 


 09/22/18 08:45   63  16  137/66 H  100


 


 09/22/18 08:14  36.4 C L  63  16  144/62 H  94














- Physical Exam


General Appearance: positive: No acute distress, Alert.  negative: Lethargic


Eyes Bilateral: positive: Normal inspection, PERRL, No lid inflammation, 

Conjunctivae nml


ENT: positive: ENT inspection nml, Pharynx nml, No signs of dehydration.  

negative: Purulent nasal drainage, Pharyngeal erythema, Oral lesions


Neck: positive: Nml inspection, Thyroid nml, No JVD, Trachea midline.  negative:

 Thyromegaly, Lymphadenopathy (R), Lymphadenopathy (L), Stiff neck, 

Swelling/bruising, Tracheal deviation


Respiratory: positive: Chest non-tender, No respiratory distress, Breath sounds 

nml.  negative: Wheezes, Rales, Rhonchi


Cardiovascular: positive: Regular rate & rhythm, No murmur, No gallop.  

negative: Irregularly irregular, Extrasystoles, Tachycardia, Bradycardia, JVD 

present, Systolic murmur, Diastolic murmur


Peripheral Pulses: positive: 2+


Abdomen: positive: No organomegaly, Nml bowel sounds, No distention, Tenderness.

  negative: Guarding, Rebound


Back: positive: Nml inspection.  negative: CVA tenderness (R), CVA tenderness 

(L)


Skin: positive: Color nml, No rash, Warm, Dry.  negative: Cyanosis, Diaphoresis,

 Pallor


Extremities: positive: Non-tender, Nml appearance.  negative: Calf tenderness, 

Denise's sign/cords


Neurologic/Psychiatric: positive: Sensation nml, Mood/affect nml.  negative: 

Weakness, Sensory loss, Facial droop, Slurred/abnml speech, Depressed mood/aff

ect





Conclusion/Plan





- Problem List


(1) Abdominal pain


Conclusion/Plan: 


pt has chronic abdominal pain and discomfort and visited a few times before, 

etiology has not been clear. CT of abdomen today still reveals no acute finding.


pt had AAA repair, but now pt's GFR is 37, can not have contrast CT of abdomen 

now. 


pain control with Morphine PRN


bed rest with NPO except meds


IV of protonix











(2) UTI (urinary tract infection)


Conclusion/Plan: 


UA reveals possible UTI, pt already began to have bactrim in out-pt


start IV of Rocephin, UA culture is pending, will follow up








(3) Acute on chronic renal insufficiency


Conclusion/Plan: 


pt's creatinine is 1.8, about two months ago, it was 1.5


start gently IVF of NS at 75 cc/h. pt has hx of CHF


daily lab monitor








(4) Hyponatremia


Conclusion/Plan: 


hyponatremia, hypovolumin. pt also is chronic hyponatremia


IVF of NS at 75 cc/h


lab monitor 








(5) CHF (congestive heart failure)


Conclusion/Plan: 


The last ECHO reveals pt had combination of both systolic and diastolic CHF at 

EF 40-45%


on tele, vital monitor


restore home meds, Coreg, Lisinopril


recheck ECHO 








(6) HTN (hypertension)


Conclusion/Plan: 


stable, restore home meds








(7) Hypothyroidism


Conclusion/Plan: 


restore home meds


check TSH, follow up








(8) DVT prophylaxis


Conclusion/Plan: 


SCD and Lovenox








(9) Full code status


Conclusion/Plan: 


pt want to have full code








- Lab Results


Fish Bones: 


                                 09/22/18 08:28





                                 09/22/18 08:28





Core Measures





- Anticipated LOS


I expect patient to be DC'd or transferred within 96 hours.: Yes





- DVT/VTE - Prophylaxis


VTE/DVT Device ordered at admit?: Yes


VTE/DVT Prophylaxis med ordered at admit?: Yes

## 2018-09-22 NOTE — CT REPORT
Reason:  abd pain peritoneal GFR too low for contrast

Procedure Date:  09/22/2018   

Accession Number:  [id number] / [id number]                    

Procedure:  CT  - Abdomen/Pelvis W/O CPT Code:  

 

FULL RESULT:

 

 

EXAM:

CT ABDOMEN AND PELVIS

 

EXAM DATE: 9/22/2018 09:36 AM.

 

CLINICAL HISTORY: Abdominal pain, renal insufficiency.

 

COMPARISONS: CT abdomen pelvis dated 08/29/2018.

 

TECHNIQUE: Routine helical CT imaging was performed through the abdomen 

and pelvis. IV contrast: No. Enteric contrast: Contrast visualized in the 

colon. Reconstructions: Coronal and sagittal.

 

In accordance with CT protocol optimization, one or more of the following 

dose reduction techniques were utilized for this exam: automated exposure 

control, adjustment of mA and/or KV based on patient size, or use of 

iterative reconstructive technique.

 

FINDINGS:

Lung Bases: Unremarkable.

 

Liver: Unremarkable.

 

Gallbladder/Bile Ducts: Unremarkable.

 

Spleen: Unremarkable.

 

Pancreas: Unremarkable.

 

Adrenal Glands: Unremarkable.

 

Kidneys: Normal. No masses or hydronephrosis.

 

Peritoneal Cavity/Bowel: No free fluid, free air or adenopathy. No masses 

or acute inflammatory process. Colonic diverticula again visualized 

without evidence of diverticulitis.

 

Pelvic Organs: Coarse calcifications within the prostate. The bladder and 

visualized pelvic organs are otherwise within normal limits.

 

Vasculature: There appears to have been interval placement of an SMA 

stent. Aortobiiliac stent graft appears otherwise stable.

 

Bones: Extensive degenerative changes. Previous left hip replacement.

 

Other: None.

IMPRESSION:

1. No acute abnormality identified on this limited noncontrast exam.

2. There is diverticulosis without evidence of diverticulitis.

3. Interval placement of an SMA stent with otherwise unchanged appearance 

of aortobiiliac stent graft.

 

RADIA

## 2018-09-22 NOTE — ADVANCE CARE PLANNING NOTE
Advance Care Planning





- Date/Time


Date: 18


Time: 18:00





- Purpose of encounter


Text: 


advance care, such as palliative care








- Parties in attendance


Parties in attendance: 


pt and me








- Decisional capacity


Decisional capacity of: 


at this moment, pt is still requesting full code, not interested in palliative 

care, and want to go to home at his wife's care








- Subjective/Patient's story


Subjective/Patient's story: 


pt state he is very much enjoying his life now. he has chronic abdominal pain, 

intermittent, and come and go, otherwise he feels good. 








- Objective/Medical story


Objective/Medical Story: 


dementia, pt's EF is 30% at his last admission on [facility], AAA 

repaired, chronic mesentric ischemia, CVA, Afib, CKD








- Goals of Care


Goals of care determinations: 


advance care for pt, such as palliative care, change pt's code status, nurse 

facility replacement








- Plan


Plan: 


discuss with pt in detail for all possible advance care plans. pt still request 

full code, not ready to have a palliative care, pt and his wife wants to go 

home.


order ST/PT/OT and  consult for pt








- Code Status


Code Status: Attempt Resuscitation





- Time Spent on Advance Care Planning


Time spent on advance care plannin

## 2018-09-23 VITALS — DIASTOLIC BLOOD PRESSURE: 47 MMHG | SYSTOLIC BLOOD PRESSURE: 112 MMHG

## 2018-09-23 LAB
ALBUMIN DIAFP-MCNC: 3 G/DL (ref 3.2–5.5)
ALBUMIN/GLOB SERPL: 1.4 {RATIO} (ref 1–2.2)
ALP SERPL-CCNC: 52 IU/L (ref 42–121)
ALT SERPL W P-5'-P-CCNC: 12 IU/L (ref 10–60)
ANION GAP SERPL CALCULATED.4IONS-SCNC: 6 MMOL/L (ref 6–13)
AST SERPL W P-5'-P-CCNC: 14 IU/L (ref 10–42)
BASOPHILS NFR BLD AUTO: 0.1 10^3/UL (ref 0–0.1)
BASOPHILS NFR BLD AUTO: 1 %
BILIRUB BLD-MCNC: 0.6 MG/DL (ref 0.2–1)
BUN SERPL-MCNC: 21 MG/DL (ref 6–20)
CALCIUM UR-MCNC: 8.3 MG/DL (ref 8.5–10.3)
CHLORIDE SERPL-SCNC: 107 MMOL/L (ref 101–111)
CO2 SERPL-SCNC: 20 MMOL/L (ref 21–32)
CREAT SERPLBLD-SCNC: 1.4 MG/DL (ref 0.6–1.2)
EOSINOPHIL # BLD AUTO: 0.1 10^3/UL (ref 0–0.7)
EOSINOPHIL NFR BLD AUTO: 1.8 %
ERYTHROCYTE [DISTWIDTH] IN BLOOD BY AUTOMATED COUNT: 14.6 % (ref 12–15)
FERRITIN SERPL-MCNC: 324.1 NG/ML (ref 23.9–336.2)
GFRSERPLBLD MDRD-ARVRAT: 48 ML/MIN/{1.73_M2} (ref 89–?)
GLOBULIN SER-MCNC: 2.1 G/DL (ref 2.1–4.2)
GLUCOSE SERPL-MCNC: 76 MG/DL (ref 70–100)
HGB UR QL STRIP: 10.6 G/DL (ref 14–18)
HGB UR QL STRIP: 8.6 G/DL (ref 14–18)
IRON SATN MFR SERPL: 27 % (ref 20–50)
IRON SERPL-MCNC: 46 UG/DL (ref 45–182)
LYMPHOCYTES # SPEC AUTO: 0.6 10^3/UL (ref 1.5–3.5)
LYMPHOCYTES NFR BLD AUTO: 12 %
MAGNESIUM SERPL-MCNC: 2 MG/DL (ref 1.7–2.8)
MCH RBC QN AUTO: 30.6 PG (ref 27–31)
MCHC RBC AUTO-ENTMCNC: 33.2 G/DL (ref 32–36)
MCV RBC AUTO: 92.2 FL (ref 80–94)
MEAN RETIC VALUE: 116.2
MONOCYTES # BLD AUTO: 0.7 10^3/UL (ref 0–1)
MONOCYTES NFR BLD AUTO: 13.6 %
NEUTROPHILS # BLD AUTO: 3.9 10^3/UL (ref 1.5–6.6)
NEUTROPHILS # SNV AUTO: 5.4 X10^3/UL (ref 4.8–10.8)
NEUTROPHILS NFR BLD AUTO: 71.6 %
PDW BLD AUTO: 7 FL (ref 7.4–11.4)
PLATELET # BLD: 203 10^3/UL (ref 130–450)
PROT SPEC-MCNC: 5.1 G/DL (ref 6.7–8.2)
RBC MAR: 2.78 10^6/UL (ref 4.7–6.1)
RBC MAR: 2.81 10^6/UL (ref 4.7–6.1)
SODIUM SERPLBLD-SCNC: 133 MMOL/L (ref 135–145)
TIBC SERPL-MCNC: 168 UG/DL (ref 250–450)
TRANSFERRIN SERPL-MCNC: 120 MG/DL (ref 180–329)
VIT B12 SERPL-MCNC: 507 PG/ML (ref 180–914)

## 2018-09-23 RX ADMIN — SODIUM CHLORIDE SCH MG: 9 INJECTION, SOLUTION INTRAVENOUS at 06:32

## 2018-09-23 RX ADMIN — SODIUM CHLORIDE, PRESERVATIVE FREE SCH ML: 5 INJECTION INTRAVENOUS at 08:57

## 2018-09-23 RX ADMIN — TIMOLOL MALEATE SCH DROPS: 5 SOLUTION OPHTHALMIC at 08:58

## 2018-09-23 RX ADMIN — CARVEDILOL SCH MG: 3.12 TABLET, FILM COATED ORAL at 08:56

## 2018-09-23 RX ADMIN — SODIUM CHLORIDE, PRESERVATIVE FREE SCH ML: 5 INJECTION INTRAVENOUS at 01:16

## 2018-09-23 NOTE — DISCHARGE PLAN
Discharge Plan


Disposition: 01 Home, Self Care


Condition: Poor


Prescriptions: 


Ferrous Sulfate 325 mg PO DAILY #30 tablet


Nitrofurantoin Monohyd/M-Cryst [Macrobid 100 mg Capsule] 100 mg PO BID #10 

capsule


Diet: Soft


Activity Restrictions: Activity as Tolerated


Shower Restrictions: No (fall precaution)


Instruction Topics:  Abdominal Pain, UTI, Nitrofurantoin tablets or capsules


Additional Instructions or Follow Up instructions: 


You may follow up your PCP in one week, follow up vascular surgeon, and 

gastroenterologist as out-pt. You want to go home and decline any replacement 

for you. Should your symptoms return or worsen, you may present ER or call [phone] 

for help. 


Follow-Up Care: Outpatient Rehab - PT


No Smoking: If you smoke, Please STOP!  Call [phone] for help.


Follow-up with: 


Cesario Jay DO [Primary Care Provider] -

## 2018-09-23 NOTE — DISCHARGE SUMMARY
Discharge Summary


Discharge Date: 09/23/18


Discharging Provider: SANDRA ELLINGTON


Primary Care Provider: Dr. Jay


Condition at Discharge: Poor


Discharge Disposition: 01 Home, Self Care


Discharge Facility Name: home





- DIAGNOSES


Admission Diagnoses: 


(1) Abdominal pain








(2) UTI (urinary tract infection)








(3) Acute on chronic renal insufficiency








(4) Hyponatremia








(5) CHF (congestive heart failure)











(6) HTN (hypertension)








(7) Hypothyroidism





Discharge Diagnoses with Status of Each Condition: 


1) Abdominal pain


resolved. pt state he did not have any abdominal pain today. he tolerate the 

diet. No nausea, vomiting.





(2) UTI (urinary tract infection)


UA culture negative. continue to finish the antibiotics course





(3) Acute on chronic renal insufficiency


improved. Creatinine is 1.4 from yesterday 1.8





(4) Hyponatremia


stable, chronic, improved today





(5) CHF (congestive heart failure)


EF at 40-45% today ECHO. pt walk with PT in the hallway without difficult





(6) HTN (hypertension)


stable, continue home regimen, follow up PCP





(7) Hypothyroidism


stable, TSH is normal arrange





(8) dysphagia


resolved. pt tolerate the diet without any difficulty





(9) anemia


stable, HGB 10.6 as his baseline





pt decline any nurse facility replacement. pt's wife is very happy and willing 

to take care of pt.





- HPI


History of Present Illness: 


Mr. Garcia is 89-yrs-old male with a PMH significance for Dementia, combined 

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 2018, CVA, a-

Fib, HTN, BPH, hypothyoirism, status post AAA repair, both abdominal and sigmoid

diverticulosis with chronic abdominal discomfort and pain, dysphagia, ,CKD, 

pulmonary nodules with six-month followup recommended, chronic back pain, who 

present ER complaints of abdominal pain. Pt had elevated troponin with NSTEM on 

8/31/18. He was transferred to [facility] for further evaluation and 

treatment. We will request the medical records for that. pt's wife is in the 

bedside as well. Both Pt and wife can only provide limited information due to 

their dementia.  Pt report had diffused abdominal pain beginning today. Now he 

report his abdominal pain becomes better. pt report he saw his PCP and 

prescribed bactrim for his UTI. He report he took two dosage of bactrim. I 

calculate the rest is 8 pills. it is correct pill number. pt's wife report she 

did a good care of his , and is willing to continue to take care of his 

. Pt also state he does not want to go to any nurse facility. Pt denies 

nausea, vomiting, diarrhea, fever, chill, chest pain, shortness of breath, 

cough. CT of abdomen reveals no acute abnormality. UA reveals possible UTI. pt 

is afebrile and hemodynamic stable in ER. pt is admitted in observation for 

further evaluation and treatment.








- ALLERGIES


Allergies/Adverse Reactions: 


                                    Allergies











Allergy/AdvReac Type Severity Reaction Status Date / Time


 


No Known Drug Allergies Allergy   Verified 09/22/18 08:19














- MEDICATIONS


Home Medications: 


                                Ambulatory Orders











 Medication  Instructions  Recorded  Confirmed


 


Aspirin 81 mg PO DAILY 07/16/17 09/22/18


 


Tamsulosin HCl 0.4 mg PO DAILY 07/16/17 09/22/18


 


Zolpidem Tartrate 10 mg PO QPM PRN 07/16/17 09/22/18


 


Latanoprost 0.005% Ophth Drops 1 drops EACHEYE QPM 02/17/18 09/22/18





[Xalatan Ophth Drops]   


 


Timolol 0.5% Ophth Drops [Timoptic 1 drops EACHEYE BID 02/17/18 09/22/18





0.5% Ophth Drops]   


 


Doxazosin [Cardura] 4 mg PO QPM 06/25/18 09/22/18


 


Levothyroxine Sodium [Synthroid] 100 mcg PO QDAC 07/07/18 09/22/18


 


Pilocarpine HCl 5 mg PO TID 07/07/18 09/22/18


 


Tramadol HCl 50 mg PO Q6H PRN 07/07/18 09/22/18


 


raNITIdine [Zantac] 150 mg PO DAILY 07/07/18 09/22/18


 


Docusate Sodium 100 mg PO DAILY #30 capsule 08/26/18 09/22/18


 


Phenazopyridine HCl [Pyridium] 200 mg PO TID PRN #6 tablet 08/29/18 09/22/18


 


Atorvastatin Calcium 40 mg PO QPM 09/22/18 09/22/18


 


Carvedilol 3.125 mg PO BID 09/22/18 09/22/18


 


Fluticasone [Flonase] 1 sprays TRAE DAILY 09/22/18 09/22/18


 


Furosemide 20 mg PO DAILY 09/22/18 09/22/18


 


Lisinopril 5 mg PO DAILY 09/22/18 09/22/18


 


Ferrous Sulfate 325 mg PO DAILY #30 tablet 09/23/18 


 


Nitrofurantoin Monohyd/M-Cryst 100 mg PO BID #10 capsule 09/23/18 





[Macrobid 100 mg Capsule]   














- PHYSICAL EXAM AT DISCHARGE


General Appearance: positive: No acute distress, Alert.  negative: Lethargic


Eyes Bilateral: positive: Normal inspection, PERRL.  negative: No lid inflammat

ion, Conjunctivae nml


ENT: positive: ENT inspection nml, Pharynx nml, No signs of dehydration.  

negative: Purulent nasal drainage, Pharyngeal erythema, Oral lesions


Neck: positive: Nml inspection, Thyroid nml, No JVD, Trachea midline.  negative:

Thyromegaly, Lymphadenopathy (R), Lymphadenopathy (L), Stiff neck, 

Swelling/bruising, Tracheal deviation


Respiratory: positive: Chest non-tender, No respiratory distress, Breath sounds 

nml.  negative: Wheezes, Rales, Rhonchi


Cardiovascular: positive: Regular rate & rhythm, No murmur, No gallop.  

negative: Irregularly irregular, Extrasystoles, Tachycardia, Bradycardia, JVD 

present, Systolic murmur, Diastolic murmur


Peripheral Pulses: positive: 2+


Abdomen: positive: Non-tender, No organomegaly, Nml bowel sounds, No distention.

 negative: Tenderness, Guarding, Rebound


Back: positive: Nml inspection.  negative: CVA tenderness (R), CVA tenderness 

(L)


Skin: positive: Color nml, No rash, Warm, Dry.  negative: Cyanosis, Diaphoresis,

Pallor


Extremities: positive: Non-tender, Full ROM, Nml appearance.  negative: Calf 

tenderness, Joint swelling, Denise's sign/cords


Neurologic/Psychiatric: positive: Oriented x3, Motor nml, Sensation nml, 

Mood/affect nml.  negative: Weakness, Sensory loss, Facial droop, Slurred/abnml 

speech, Depressed mood/affect





- LABS


Result Diagrams: 


                                 09/23/18 12:20





                                 09/23/18 03:10





- FOLLOW UP


Follow Up: 


You may follow up your PCP in one week, follow up vascular surgeon, and 

gastroenterologist as out-pt. You want to go home and decline any replacement 

for you. Should your symptoms return or worsen, you may present ER or call 911 

for help. 








- TIME SPENT


Time Spent in Discharge (Minutes): 45

## 2018-10-02 ENCOUNTER — HOSPITAL ENCOUNTER (OUTPATIENT)
Dept: HOSPITAL 76 - ED | Age: 83
Setting detail: OBSERVATION
LOS: 1 days | Discharge: HOME | End: 2018-10-03
Attending: NURSE PRACTITIONER | Admitting: NURSE PRACTITIONER
Payer: MEDICARE

## 2018-10-02 ENCOUNTER — HOSPITAL ENCOUNTER (OUTPATIENT)
Dept: HOSPITAL 76 - EMS | Age: 83
Discharge: ANOTHER HOSPITAL | End: 2018-10-02
Attending: SURGERY
Payer: MEDICARE

## 2018-10-02 DIAGNOSIS — N40.0: ICD-10-CM

## 2018-10-02 DIAGNOSIS — R10.31: Primary | ICD-10-CM

## 2018-10-02 DIAGNOSIS — R63.4: ICD-10-CM

## 2018-10-02 DIAGNOSIS — M19.90: ICD-10-CM

## 2018-10-02 DIAGNOSIS — R13.10: ICD-10-CM

## 2018-10-02 DIAGNOSIS — N39.0: Primary | ICD-10-CM

## 2018-10-02 DIAGNOSIS — I48.91: ICD-10-CM

## 2018-10-02 DIAGNOSIS — F03.90: ICD-10-CM

## 2018-10-02 DIAGNOSIS — Z79.82: ICD-10-CM

## 2018-10-02 DIAGNOSIS — M54.9: ICD-10-CM

## 2018-10-02 DIAGNOSIS — H91.90: ICD-10-CM

## 2018-10-02 DIAGNOSIS — Z96.649: ICD-10-CM

## 2018-10-02 DIAGNOSIS — Z98.890: ICD-10-CM

## 2018-10-02 DIAGNOSIS — Z66: ICD-10-CM

## 2018-10-02 DIAGNOSIS — I13.0: ICD-10-CM

## 2018-10-02 DIAGNOSIS — E03.9: ICD-10-CM

## 2018-10-02 DIAGNOSIS — K57.30: ICD-10-CM

## 2018-10-02 DIAGNOSIS — G89.29: ICD-10-CM

## 2018-10-02 DIAGNOSIS — Z86.79: ICD-10-CM

## 2018-10-02 DIAGNOSIS — N18.9: ICD-10-CM

## 2018-10-02 DIAGNOSIS — Z87.891: ICD-10-CM

## 2018-10-02 DIAGNOSIS — I99.8: ICD-10-CM

## 2018-10-02 DIAGNOSIS — I50.42: ICD-10-CM

## 2018-10-02 LAB
ALBUMIN DIAFP-MCNC: 3.7 G/DL (ref 3.2–5.5)
ALBUMIN/GLOB SERPL: 1.2 {RATIO} (ref 1–2.2)
ALP SERPL-CCNC: 67 IU/L (ref 42–121)
ALT SERPL W P-5'-P-CCNC: 24 IU/L (ref 10–60)
ANION GAP SERPL CALCULATED.4IONS-SCNC: 13 MMOL/L (ref 6–13)
AST SERPL W P-5'-P-CCNC: 24 IU/L (ref 10–42)
BASOPHILS NFR BLD AUTO: 0.1 10^3/UL (ref 0–0.1)
BASOPHILS NFR BLD AUTO: 0.7 %
BILIRUB BLD-MCNC: 1.2 MG/DL (ref 0.2–1)
BUN SERPL-MCNC: 29 MG/DL (ref 6–20)
CALCIUM UR-MCNC: 9.5 MG/DL (ref 8.5–10.3)
CHLORIDE SERPL-SCNC: 105 MMOL/L (ref 101–111)
CLARITY UR REFRACT.AUTO: (no result)
CO2 SERPL-SCNC: 21 MMOL/L (ref 21–32)
CREAT SERPLBLD-SCNC: 0.6 MG/DL (ref 0.6–1.2)
EOSINOPHIL # BLD AUTO: 0 10^3/UL (ref 0–0.7)
EOSINOPHIL NFR BLD AUTO: 0.6 %
ERYTHROCYTE [DISTWIDTH] IN BLOOD BY AUTOMATED COUNT: 14.2 % (ref 12–15)
GFRSERPLBLD MDRD-ARVRAT: 127 ML/MIN/{1.73_M2} (ref 89–?)
GLOBULIN SER-MCNC: 3 G/DL (ref 2.1–4.2)
GLUCOSE SERPL-MCNC: 101 MG/DL (ref 70–100)
GLUCOSE UR QL STRIP.AUTO: NEGATIVE MG/DL
HGB UR QL STRIP: 11.1 G/DL (ref 14–18)
KETONES UR QL STRIP.AUTO: (no result) MG/DL
LIPASE SERPL-CCNC: 27 U/L (ref 22–51)
LYMPHOCYTES # SPEC AUTO: 1.2 10^3/UL (ref 1.5–3.5)
LYMPHOCYTES NFR BLD AUTO: 16.6 %
MAGNESIUM SERPL-MCNC: 2 MG/DL (ref 1.7–2.8)
MCH RBC QN AUTO: 31.3 PG (ref 27–31)
MCHC RBC AUTO-ENTMCNC: 34.8 G/DL (ref 32–36)
MCV RBC AUTO: 90 FL (ref 80–94)
MONOCYTES # BLD AUTO: 0.7 10^3/UL (ref 0–1)
MONOCYTES NFR BLD AUTO: 9.9 %
NEUTROPHILS # BLD AUTO: 5.1 10^3/UL (ref 1.5–6.6)
NEUTROPHILS # SNV AUTO: 7.1 X10^3/UL (ref 4.8–10.8)
NEUTROPHILS NFR BLD AUTO: 72.2 %
NITRITE UR QL STRIP.AUTO: NEGATIVE
PDW BLD AUTO: 7.2 FL (ref 7.4–11.4)
PH UR STRIP.AUTO: 7.5 PH (ref 5–7.5)
PLATELET # BLD: 262 10^3/UL (ref 130–450)
PROT SPEC-MCNC: 6.7 G/DL (ref 6.7–8.2)
PROT UR STRIP.AUTO-MCNC: 30 MG/DL
RBC # UR STRIP.AUTO: (no result) /UL
RBC # URNS HPF: (no result) /HPF (ref 0–5)
RBC MAR: 3.56 10^6/UL (ref 4.7–6.1)
SODIUM SERPLBLD-SCNC: 139 MMOL/L (ref 135–145)
SP GR UR STRIP.AUTO: 1.01 (ref 1–1.03)
SQUAMOUS URNS QL MICRO: (no result)
UROBILINOGEN UR QL STRIP.AUTO: (no result) E.U./DL
UROBILINOGEN UR STRIP.AUTO-MCNC: NEGATIVE MG/DL

## 2018-10-02 PROCEDURE — 87086 URINE CULTURE/COLONY COUNT: CPT

## 2018-10-02 PROCEDURE — 96365 THER/PROPH/DIAG IV INF INIT: CPT

## 2018-10-02 PROCEDURE — 81003 URINALYSIS AUTO W/O SCOPE: CPT

## 2018-10-02 PROCEDURE — 80053 COMPREHEN METABOLIC PANEL: CPT

## 2018-10-02 PROCEDURE — 83735 ASSAY OF MAGNESIUM: CPT

## 2018-10-02 PROCEDURE — 83690 ASSAY OF LIPASE: CPT

## 2018-10-02 PROCEDURE — 99283 EMERGENCY DEPT VISIT LOW MDM: CPT

## 2018-10-02 PROCEDURE — 96375 TX/PRO/DX INJ NEW DRUG ADDON: CPT

## 2018-10-02 PROCEDURE — 99285 EMERGENCY DEPT VISIT HI MDM: CPT

## 2018-10-02 PROCEDURE — 81001 URINALYSIS AUTO W/SCOPE: CPT

## 2018-10-02 PROCEDURE — 99284 EMERGENCY DEPT VISIT MOD MDM: CPT

## 2018-10-02 PROCEDURE — 84443 ASSAY THYROID STIM HORMONE: CPT

## 2018-10-02 PROCEDURE — 36415 COLL VENOUS BLD VENIPUNCTURE: CPT

## 2018-10-02 PROCEDURE — 83605 ASSAY OF LACTIC ACID: CPT

## 2018-10-02 PROCEDURE — 85025 COMPLETE CBC W/AUTO DIFF WBC: CPT

## 2018-10-02 PROCEDURE — 96361 HYDRATE IV INFUSION ADD-ON: CPT

## 2018-10-02 RX ADMIN — POTASSIUM CHLORIDE STA MEQ: 1500 TABLET, EXTENDED RELEASE ORAL at 20:03

## 2018-10-02 RX ADMIN — DOCUSATE SODIUM SCH MG: 250 CAPSULE, LIQUID FILLED ORAL at 22:23

## 2018-10-02 RX ADMIN — CARVEDILOL SCH MG: 3.12 TABLET, FILM COATED ORAL at 22:03

## 2018-10-02 RX ADMIN — SENNOSIDES SCH MG: 8.6 TABLET, FILM COATED ORAL at 22:24

## 2018-10-02 RX ADMIN — SODIUM CHLORIDE SCH MLS/HR: 9 INJECTION, SOLUTION INTRAVENOUS at 20:28

## 2018-10-02 RX ADMIN — POTASSIUM CHLORIDE STA MEQ: 1500 TABLET, EXTENDED RELEASE ORAL at 20:04

## 2018-10-02 NOTE — HISTORY & PHYSICAL EXAMINATION
Chief Complaint





- Chief Complaint


Chief Complaint: abdominal pain





History of Present Illness





- History of Present Illness


HPI Comment/Other: 


Mr. Garcia is 89-yrs-old male with a PMH significance for Dementia, combined 

chronic systolic and diastolic CHF at EF 40-45% on ECHO of 2018, CVA, a-

Fib, HTN, BPH, hypothyoirism, status post AAA repair, both abdominal and sigmoid

diverticulosis with chronic abdominal discomfort and pain, dysphagia, ,CKD, pu

lmonary nodules with six-month followup recommended, chronic back pain, who 

present ER complaints of abdominal pain. pt report his abdominal pain is 

intermittent, on and off. Pt had AAA repaired, and had chronic mesenteric 

ischemia diagnosis on [facility], and monitored by his vascular surgeon. Pt's

UA indicates UTI. pt also had elevated Lactic acid. Pt's WBC is normal. pt is 

afebrile and hemodynamic stable at this time. When discussion d/c plan with pt, 

pt want to be d/c to home. pt denies chest pain, fever, chill, shortness of 

breath, headache, nausea, vomiting, diarrhea.








History





- Past Medical History


Cardiovascular: reports: Atrial fibrillation


Respiratory: reports: None


Neuro: reports: Dementia, CVA


Endocrine/Autoimmune: reports: HyPOthyroidism


GI: reports: None


: reports: Benign prostate hypertrophy


HEENT: reports: Chronic hearing loss


Psych: reports: None


Musculoskeletal: reports: Osteoarthritis


Derm: reports: None


MRSA Hx?: No





- Past Surgical History


Ortho: reports: Hip replacement





- Family & Social History


Family History: Mother: , CVA/TIA, Father: , CAD, COPD/Emphysema


Family History Comment/Other: pt report he is living with his wife at [place]

in his own home. He had three children.





- POLST


Patient has POLST: No


POLST Status: DNR





Meds/Allgy





- Home Medications


Home Medications: 


                                Ambulatory Orders











 Medication  Instructions  Recorded  Confirmed


 


Aspirin 81 mg PO DAILY 07/16/17 10/02/18


 


Tamsulosin HCl 0.4 mg PO DAILY 07/16/17 10/02/18


 


Zolpidem Tartrate 10 mg PO QPM PRN 07/16/17 10/02/18


 


Latanoprost 0.005% Ophth Drops 1 drops EACHEYE QPM 02/17/18 10/02/18





[Xalatan Ophth Drops]   


 


Timolol 0.5% Ophth Drops [Timoptic 1 drops EACHEYE DAILY 02/17/18 10/02/18





0.5% Ophth Drops]   


 


Doxazosin [Cardura] 4 mg PO QPM 06/25/18 10/02/18


 


Levothyroxine Sodium [Synthroid] 100 mcg PO QDAC 07/07/18 10/02/18


 


Pilocarpine HCl 5 mg PO TID 07/07/18 10/02/18


 


raNITIdine [Zantac] 150 mg PO DAILY 07/07/18 10/02/18


 


Docusate Sodium 100 mg PO DAILY #30 capsule 08/26/18 10/02/18


 


Atorvastatin Calcium 40 mg PO QPM 09/22/18 10/02/18


 


Carvedilol 3.125 mg PO BID 09/22/18 10/02/18


 


Fluticasone [Flonase] 1 sprays TRAE DAILY 09/22/18 10/02/18


 


Furosemide 20 mg PO DAILY 09/22/18 10/02/18


 


Lisinopril 5 mg PO DAILY 09/22/18 10/02/18


 


Ferrous Sulfate 325 mg PO DAILY #30 tablet 09/23/18 10/02/18


 


Ipratropium [Atrovent] 2 puffs IH BID 10/02/18 10/02/18














- Allergies


Allergies/Adverse Reactions: 


                                    Allergies











Allergy/AdvReac Type Severity Reaction Status Date / Time


 


No Known Drug Allergies Allergy   Verified 10/02/18 13:10














Review of Systems





- Constitutional


Constitutional: denies: Fatigue, Fever, Chills, Malaise, Weakness, Poor 

appetite, Diaphoresis, Night sweats





- Eyes


Eyes: denies: Pain, Irritation, Amaurosis, Blurred vision, Spots in vision, 

Field loss, Vision loss, Dipolpia





- Ears, Nose & Throat


Ears, Nose & Throat: denies: Ear pain, Hearing loss, Hearing aids, Tinnitus, 

Vertigo, Nasal pain, Nasal discharge, Nosebleeds, Nasal obstruction, Nasal 

congestion, Postnasal drainage, Dentures, Sore throat, Hoarseness, Mouth 

lesions, Bleeding gums





- Cardiovascular


Cariovascular: denies: Irregular heart rate, Palpitations, Chest pain, Edema, 

Lightheadedness, Syncope, Exertional dyspnea, Decr. exercise tolerance





- Respiratory


Respiratory: denies: Cough, Sputum production, Wheezing, Snoring, Hemoptysis, 

Orthopnea, SOB at rest, SOB with exertion





- Gastrointestinal


Gastrointestinal: reports: Abdominal pain.  denies: Abdominal distention, 

Constipation, Diarrhea, Change in bowel habits, Rectal bleeding, Black stools, 

Bloody stools, Nausea, Vomiting, Bile emesis, Josh blood emesis, Coffee grounds

 emesis, Reflux/heartburn, Bloating, Poor appetite





- Genitourinary


Genitourinary: denies: Dysuria, Frequency, Urgency, Hematuria, Incontinence, 

Flank pain, Nocturia, Urethral discharge





- Musculoskeletal


Musculoskeletal: denies: Muscle pain, Back pain, Muscle aches, Stiffness, 

Limited range of motion, Muscle weakness, Gout, Joint pain





- Integumentary


Integumentary: denies: Rash, Pruritis, Lesions, Dryness, Acne





- Neurological


Neurological: denies: General weakness, Focal weakness, Headache, Dizziness, 

Numbness, Memory problems, Pre-existing deficit, Abnormal gait, Seizures, 

Incoordination, Slurred speech





- Psychiatric


Psychiatric: denies: Depression, Anxiety, Suicidal, Delusions, Hallucinations, 

Homicidal





- Endocrine


Endocrine: denies: Polyuria, Polydypsia, Polyphagia, Intolerance to cold





- Hematologic/Lymphatic


Hematologic/Lymphatic: denies: Anemia, Bruising, Petechiae, Blood clots, Lymphad

enopathy, Bleeding tendencies





Exam





- Vital Signs


Reviewed Vital Signs: Yes


Vital Signs: 





                                Vital Signs x48h











  Temp Pulse Resp BP Pulse Ox


 


 10/02/18 14:30   67  25 H  139/61 H  100


 


 10/02/18 13:07  36.4 C L  68  19  129/50 L  100














- Physical Exam


General Appearance: positive: No acute distress, Alert.  negative: Lethargic


Eyes Bilateral: positive: Normal inspection, PERRL, No lid inflammation, 

Conjunctivae nml


ENT: positive: ENT inspection nml, Pharynx nml, No signs of dehydration.  

negative: Purulent nasal drainage, Pharyngeal erythema, Oral lesions


Neck: positive: Nml inspection, Thyroid nml, No JVD, Trachea midline.  negative:

 Thyromegaly, Lymphadenopathy (R), Lymphadenopathy (L), Stiff neck, 

Swelling/bruising, Tracheal deviation


Respiratory: positive: Chest non-tender, No respiratory distress, Breath sounds 

nml.  negative: Wheezes, Rales, Rhonchi


Cardiovascular: positive: Regular rate & rhythm, No murmur, No gallop.  

negative: Irregularly irregular, Extrasystoles, Tachycardia, Bradycardia, JVD 

present, Systolic murmur, Diastolic murmur


Peripheral Pulses: positive: 2+


Abdomen: positive: Non-tender, No organomegaly, Nml bowel sounds, No distention.

  negative: Tenderness, Guarding, Rebound


Back: positive: Nml inspection.  negative: CVA tenderness (R), CVA tenderness 

(L)


Skin: positive: Color nml, No rash, Warm, Dry.  negative: Cyanosis, Diaphoresis,

 Pallor


Extremities: positive: Non-tender, Full ROM, Nml appearance.  negative: Calf 

tenderness, Joint swelling, Denise's sign/cords


Neurologic/Psychiatric: positive: Oriented x3, Sensation nml, Mood/affect nml.  

negative: Weakness, Sensory loss, Facial droop, Slurred/abnml speech, Depressed 

mood/affect





Conclusion/Plan





- Problem List


(1) Urinary tract infection


Conclusion/Plan: 


UA indicate UTI


treat with Rocephin


gently IVF


Qualifiers: 


   Urinary tract infection type: site unspecified   Hematuria presence: without 

hematuria   Qualified Code(s): N39.0 - Urinary tract infection, site not 

specified   





(2) Elevated lactic acid level


Conclusion/Plan: 


pt's WBC is normal, no fever, chill. but lactic acid is 3


continue monitor lactic acid


treat with UTI with rocephin


IVF of NS gently








(3) Abdominal pain


Conclusion/Plan: 


chronic, pain control


advise pt follow up his vascular surgeon as out-pt








(4) CHF (congestive heart failure)


Conclusion/Plan: 


stable, resume home meds


tele, vital monitor








(5) Afib


Conclusion/Plan: 


stable, resume home meds


 tele monitor








- Lab Results


Fish Bones: 


                                 10/03/18 13:33





                                 10/03/18 05:40





Core Measures





- Anticipated LOS


I expect patient to be DC'd or transferred within 96 hours.: Yes





- DVT/VTE - Prophylaxis


VTE/DVT Device ordered at admit?: Yes


VTE/DVT Prophylaxis med ordered at admit?: Yes

## 2018-10-03 VITALS — SYSTOLIC BLOOD PRESSURE: 116 MMHG | DIASTOLIC BLOOD PRESSURE: 54 MMHG

## 2018-10-03 LAB
ALBUMIN DIAFP-MCNC: 2.8 G/DL (ref 3.2–5.5)
ALBUMIN/GLOB SERPL: 1.3 {RATIO} (ref 1–2.2)
ALP SERPL-CCNC: 55 IU/L (ref 42–121)
ALT SERPL W P-5'-P-CCNC: 17 IU/L (ref 10–60)
ANION GAP SERPL CALCULATED.4IONS-SCNC: 10 MMOL/L (ref 6–13)
AST SERPL W P-5'-P-CCNC: 14 IU/L (ref 10–42)
BASOPHILS NFR BLD AUTO: 0 10^3/UL (ref 0–0.1)
BASOPHILS NFR BLD AUTO: 0.2 10^3/UL (ref 0–0.1)
BASOPHILS NFR BLD AUTO: 0.3 %
BASOPHILS NFR BLD AUTO: 1.3 %
BILIRUB BLD-MCNC: 0.8 MG/DL (ref 0.2–1)
BUN SERPL-MCNC: 23 MG/DL (ref 6–20)
CALCIUM UR-MCNC: 7.9 MG/DL (ref 8.5–10.3)
CHLORIDE SERPL-SCNC: 111 MMOL/L (ref 101–111)
CO2 SERPL-SCNC: 19 MMOL/L (ref 21–32)
CREAT SERPLBLD-SCNC: 1.2 MG/DL (ref 0.6–1.2)
EOSINOPHIL # BLD AUTO: 0 10^3/UL (ref 0–0.7)
EOSINOPHIL # BLD AUTO: 0.1 10^3/UL (ref 0–0.7)
EOSINOPHIL NFR BLD AUTO: 0.2 %
EOSINOPHIL NFR BLD AUTO: 1.1 %
ERYTHROCYTE [DISTWIDTH] IN BLOOD BY AUTOMATED COUNT: 14.3 % (ref 12–15)
ERYTHROCYTE [DISTWIDTH] IN BLOOD BY AUTOMATED COUNT: 14.5 % (ref 12–15)
GFRSERPLBLD MDRD-ARVRAT: 57 ML/MIN/{1.73_M2} (ref 89–?)
GLOBULIN SER-MCNC: 2.2 G/DL (ref 2.1–4.2)
GLUCOSE SERPL-MCNC: 99 MG/DL (ref 70–100)
HGB UR QL STRIP: 8.8 G/DL (ref 14–18)
HGB UR QL STRIP: 9.2 G/DL (ref 14–18)
LYMPHOCYTES # SPEC AUTO: 0.2 10^3/UL (ref 1.5–3.5)
LYMPHOCYTES # SPEC AUTO: 0.2 10^3/UL (ref 1.5–3.5)
LYMPHOCYTES NFR BLD AUTO: 1.4 %
LYMPHOCYTES NFR BLD AUTO: 2 %
MAGNESIUM SERPL-MCNC: 1.6 MG/DL (ref 1.7–2.8)
MCH RBC QN AUTO: 30.6 PG (ref 27–31)
MCH RBC QN AUTO: 30.9 PG (ref 27–31)
MCHC RBC AUTO-ENTMCNC: 33.4 G/DL (ref 32–36)
MCHC RBC AUTO-ENTMCNC: 33.4 G/DL (ref 32–36)
MCV RBC AUTO: 91.6 FL (ref 80–94)
MCV RBC AUTO: 92.5 FL (ref 80–94)
MONOCYTES # BLD AUTO: 0.4 10^3/UL (ref 0–1)
MONOCYTES # BLD AUTO: 0.5 10^3/UL (ref 0–1)
MONOCYTES NFR BLD AUTO: 3.6 %
MONOCYTES NFR BLD AUTO: 3.7 %
NEUTROPHILS # BLD AUTO: 12.1 10^3/UL (ref 1.5–6.6)
NEUTROPHILS # BLD AUTO: 9.8 10^3/UL (ref 1.5–6.6)
NEUTROPHILS # SNV AUTO: 10.6 X10^3/UL (ref 4.8–10.8)
NEUTROPHILS # SNV AUTO: 12.9 X10^3/UL (ref 4.8–10.8)
NEUTROPHILS NFR BLD AUTO: 92.9 %
NEUTROPHILS NFR BLD AUTO: 93.5 %
PDW BLD AUTO: 6.9 FL (ref 7.4–11.4)
PDW BLD AUTO: 7.4 FL (ref 7.4–11.4)
PLATELET # BLD: 197 10^3/UL (ref 130–450)
PLATELET # BLD: 226 10^3/UL (ref 130–450)
PROT SPEC-MCNC: 5 G/DL (ref 6.7–8.2)
RBC MAR: 2.87 10^6/UL (ref 4.7–6.1)
RBC MAR: 2.98 10^6/UL (ref 4.7–6.1)
SODIUM SERPLBLD-SCNC: 140 MMOL/L (ref 135–145)

## 2018-10-03 RX ADMIN — SODIUM CHLORIDE SCH MLS/HR: 9 INJECTION, SOLUTION INTRAVENOUS at 07:53

## 2018-10-03 RX ADMIN — DOCUSATE SODIUM SCH MG: 250 CAPSULE, LIQUID FILLED ORAL at 11:09

## 2018-10-03 RX ADMIN — CARVEDILOL SCH MG: 3.12 TABLET, FILM COATED ORAL at 11:09

## 2018-10-03 RX ADMIN — SODIUM CHLORIDE, PRESERVATIVE FREE PRN ML: 5 INJECTION INTRAVENOUS at 06:35

## 2018-10-03 RX ADMIN — SODIUM CHLORIDE, PRESERVATIVE FREE PRN ML: 5 INJECTION INTRAVENOUS at 06:40

## 2018-10-03 RX ADMIN — SENNOSIDES SCH MG: 8.6 TABLET, FILM COATED ORAL at 11:09

## 2018-10-03 RX ADMIN — SODIUM CHLORIDE, PRESERVATIVE FREE SCH: 5 INJECTION INTRAVENOUS at 11:11

## 2018-10-03 RX ADMIN — SODIUM CHLORIDE, PRESERVATIVE FREE SCH: 5 INJECTION INTRAVENOUS at 02:34

## 2018-10-03 NOTE — DISCHARGE PLAN
Discharge Plan


Disposition: 01 Home, Self Care


Condition: Poor


Prescriptions: 


Nitrofurantoin [Macrobid] 100 mg PO BID #12 capsule


Diet: Soft


Activity Restrictions: Activity as Tolerated


Shower Restrictions: No (fall precaution)


Instruction Topics:  UTI, Nitrofurantoin tablets or capsules


Additional Instructions or Follow Up instructions: 


you may follow up your PCP in one week, follow up your cardiologist and vascular

surgeon as out-pt. You denies any more abdominal pain today. Your UA indicated 

you have UTI. You are prescribed antibiotics to treat your UTI for another 6 

days, please finish the antibiotics course. should your symptoms return or 

worsen, you may present ER or call 911 for help.


No Smoking: If you smoke, Please STOP!  Call [phone] for help.


Follow-up with: 


Cesario Jay DO [Primary Care Provider] -

## 2018-10-03 NOTE — DISCHARGE SUMMARY
Discharge Summary


Discharge Date: 10/03/18


Discharging Provider: SANDRA ELLINGTON


Primary Care Provider: Dr. Cesario Jay


Condition at Discharge: Poor


Discharge Disposition: 01 Home, Self Care


Discharge Facility Name: home





- DIAGNOSES


Admission Diagnoses: 


(1) Urinary tract infection


  





(2) Elevated lactic acid level











(3) Abdominal pain








(4) CHF (congestive heart failure)








(5) Afib





Discharge Diagnoses with Status of Each Condition: 


(1) Urinary tract infection


  pt denies dysuria, pt request to be d/c today, no fever/chill, WBC is normal. 

pt is prescribed antibiotics to home. also called pt's home phone and left 

message for where his prescription was sent. pt declined replacement or other 

helps as he did on last admission





(2) Elevated lactic acid level


resolved





(3) Abdominal pain


resolved. pt also tolerated the diet, no nausea, vomiting or diarrhea.





(4) CHF (congestive heart failure)


stable





(5) Afib


stable





(6) dementia


stable, pt declined replacement or other helps as he did on last admission




















- HPI


History of Present Illness: 


Mr. Garcia is 89-yrs-old male with a PMH significance for Dementia, combined 

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 2018, CVA, a-

Fib, HTN, BPH, hypothyoirism, status post AAA repair, both abdominal and sigmoid

diverticulosis with chronic abdominal discomfort and pain, dysphagia, ,CKD, 

pulmonary nodules with six-month followup recommended, chronic back pain, who 

present ER complaints of abdominal pain. pt report his abdominal pain is 

intermittent, on and off. Pt had AAA repaired, and had chronic mesenteric 

ischemia diagnosis on [facility], and monitored by his vascular surgeon. Pt's

UA indicates UTI. pt also had elevated Lactic acid. Pt's WBC is normal. pt is 

afebrile and hemodynamic stable at this time. When discussion d/c plan with pt, 

pt want to be d/c to home. pt denies chest pain, fever, chill, shortness of 

breath, headache, nausea, vomiting, diarrhea.








- ALLERGIES


Allergies/Adverse Reactions: 


                                    Allergies











Allergy/AdvReac Type Severity Reaction Status Date / Time


 


No Known Drug Allergies Allergy   Verified 10/02/18 13:10














- MEDICATIONS


Home Medications: 


                                Ambulatory Orders











 Medication  Instructions  Recorded  Confirmed


 


Aspirin 81 mg PO DAILY 07/16/17 10/02/18


 


Tamsulosin HCl 0.4 mg PO DAILY 07/16/17 10/02/18


 


Zolpidem Tartrate 10 mg PO QPM PRN 07/16/17 10/02/18


 


Latanoprost 0.005% Ophth Drops 1 drops EACHEYE QPM 02/17/18 10/02/18





[Xalatan Ophth Drops]   


 


Timolol 0.5% Ophth Drops [Timoptic 1 drops EACHEYE DAILY 02/17/18 10/02/18





0.5% Ophth Drops]   


 


Doxazosin [Cardura] 4 mg PO QPM 06/25/18 10/02/18


 


Levothyroxine Sodium [Synthroid] 100 mcg PO QDAC 07/07/18 10/02/18


 


Pilocarpine HCl 5 mg PO TID 07/07/18 10/02/18


 


raNITIdine [Zantac] 150 mg PO DAILY 07/07/18 10/02/18


 


Docusate Sodium 100 mg PO DAILY #30 capsule 08/26/18 10/02/18


 


Atorvastatin Calcium 40 mg PO QPM 09/22/18 10/02/18


 


Carvedilol 3.125 mg PO BID 09/22/18 10/02/18


 


Fluticasone [Flonase] 1 sprays TRAE DAILY 09/22/18 10/02/18


 


Furosemide 20 mg PO DAILY 09/22/18 10/02/18


 


Lisinopril 5 mg PO DAILY 09/22/18 10/02/18


 


Ferrous Sulfate 325 mg PO DAILY #30 tablet 09/23/18 10/02/18


 


Ipratropium [Atrovent] 2 puffs IH BID 10/02/18 10/02/18


 


Nitrofurantoin [Macrobid] 100 mg PO BID #12 capsule 10/03/18 














- PHYSICAL EXAM AT DISCHARGE


General Appearance: positive: No acute distress, Alert.  negative: Lethargic


Eyes Bilateral: positive: Normal inspection, PERRL, No lid inflammation, 

Conjunctivae nml


ENT: positive: ENT inspection nml, Pharynx nml, No signs of dehydration.  

negative: Purulent nasal drainage, Pharyngeal erythema, Oral lesions


Neck: positive: Nml inspection, Thyroid nml, No JVD, Trachea midline.  negative:

 Thyromegaly, Lymphadenopathy (R), Lymphadenopathy (L), Stiff neck, 

Swelling/bruising, Tracheal deviation


Respiratory: positive: Chest non-tender, No respiratory distress, Breath sounds 

nml.  negative: Wheezes, Rales, Rhonchi


Cardiovascular: positive: Regular rate & rhythm, No murmur, No gallop.  negat

franklin: Irregularly irregular, Extrasystoles, Tachycardia, Bradycardia, JVD 

present, Systolic murmur, Diastolic murmur


Peripheral Pulses: positive: 2+


Abdomen: positive: Non-tender, No organomegaly, Nml bowel sounds, No distention.

  negative: Tenderness, Guarding, Rebound


Back: positive: Nml inspection.  negative: CVA tenderness (R), CVA tenderness 

(L)


Skin: positive: Color nml, No rash, Warm, Dry.  negative: Cyanosis, Diaphoresis,

 Pallor


Extremities: positive: Non-tender, Full ROM, Nml appearance.  negative: Calf 

tenderness, Joint swelling, Denise's sign/cords


Neurologic/Psychiatric: positive: Oriented x3, Sensation nml, Mood/affect nml.  

negative: Weakness, Sensory loss, Facial droop, Slurred/abnml speech, Depressed 

mood/affect





- LABS


Result Diagrams: 


                                 10/03/18 13:33





                                 10/03/18 05:40





- FOLLOW UP


Follow Up: 


you may follow up your PCP in one week, follow up your cardiologist and vascular

 surgeon as out-pt. You denies any more abdominal pain today. Your UA indicated 

you have UTI. You are prescribed antibiotics to treat your UTI for another 6 

days, please finish the antibiotics course. should your symptoms return or 

worsen, you may present ER or call 911 for help.








- TIME SPENT


Time Spent in Discharge (Minutes): 50

## 2018-10-04 ENCOUNTER — HOSPITAL ENCOUNTER (EMERGENCY)
Dept: HOSPITAL 76 - ED | Age: 83
Discharge: HOME | End: 2018-10-04
Payer: MEDICARE

## 2018-10-04 ENCOUNTER — HOSPITAL ENCOUNTER (OUTPATIENT)
Dept: HOSPITAL 76 - EMS | Age: 83
Discharge: ANOTHER HOSPITAL | End: 2018-10-04
Attending: SURGERY
Payer: MEDICARE

## 2018-10-04 VITALS — SYSTOLIC BLOOD PRESSURE: 139 MMHG | DIASTOLIC BLOOD PRESSURE: 66 MMHG

## 2018-10-04 DIAGNOSIS — M54.9: ICD-10-CM

## 2018-10-04 DIAGNOSIS — R10.84: ICD-10-CM

## 2018-10-04 DIAGNOSIS — E03.9: ICD-10-CM

## 2018-10-04 DIAGNOSIS — N18.9: Primary | ICD-10-CM

## 2018-10-04 DIAGNOSIS — Z79.82: ICD-10-CM

## 2018-10-04 DIAGNOSIS — I70.8: ICD-10-CM

## 2018-10-04 DIAGNOSIS — R10.30: Primary | ICD-10-CM

## 2018-10-04 DIAGNOSIS — K55.1: ICD-10-CM

## 2018-10-04 DIAGNOSIS — Z86.73: ICD-10-CM

## 2018-10-04 DIAGNOSIS — Z96.649: ICD-10-CM

## 2018-10-04 DIAGNOSIS — R20.2: ICD-10-CM

## 2018-10-04 LAB
ALBUMIN DIAFP-MCNC: 2.9 G/DL (ref 3.2–5.5)
ALBUMIN/GLOB SERPL: 1.2 {RATIO} (ref 1–2.2)
ALP SERPL-CCNC: 64 IU/L (ref 42–121)
ALT SERPL W P-5'-P-CCNC: 20 IU/L (ref 10–60)
ANION GAP SERPL CALCULATED.4IONS-SCNC: 7 MMOL/L (ref 6–13)
AST SERPL W P-5'-P-CCNC: 22 IU/L (ref 10–42)
BASOPHILS NFR BLD AUTO: 0 10^3/UL (ref 0–0.1)
BASOPHILS NFR BLD AUTO: 0.2 %
BILIRUB BLD-MCNC: 0.6 MG/DL (ref 0.2–1)
BUN SERPL-MCNC: 29 MG/DL (ref 6–20)
CALCIUM UR-MCNC: 8.2 MG/DL (ref 8.5–10.3)
CHLORIDE SERPL-SCNC: 105 MMOL/L (ref 101–111)
CO2 SERPL-SCNC: 21 MMOL/L (ref 21–32)
CREAT SERPLBLD-SCNC: 1.5 MG/DL (ref 0.6–1.2)
EOSINOPHIL # BLD AUTO: 0.1 10^3/UL (ref 0–0.7)
EOSINOPHIL NFR BLD AUTO: 1.3 %
ERYTHROCYTE [DISTWIDTH] IN BLOOD BY AUTOMATED COUNT: 14.7 % (ref 12–15)
GFRSERPLBLD MDRD-ARVRAT: 44 ML/MIN/{1.73_M2} (ref 89–?)
GLOBULIN SER-MCNC: 2.4 G/DL (ref 2.1–4.2)
GLUCOSE SERPL-MCNC: 136 MG/DL (ref 70–100)
HGB UR QL STRIP: 8.8 G/DL (ref 14–18)
LIPASE SERPL-CCNC: 26 U/L (ref 22–51)
LYMPHOCYTES # SPEC AUTO: 0.4 10^3/UL (ref 1.5–3.5)
LYMPHOCYTES NFR BLD AUTO: 4.6 %
MCH RBC QN AUTO: 30.6 PG (ref 27–31)
MCHC RBC AUTO-ENTMCNC: 33.4 G/DL (ref 32–36)
MCV RBC AUTO: 91.7 FL (ref 80–94)
MONOCYTES # BLD AUTO: 0.4 10^3/UL (ref 0–1)
MONOCYTES NFR BLD AUTO: 4.2 %
NEUTROPHILS # BLD AUTO: 8.5 10^3/UL (ref 1.5–6.6)
NEUTROPHILS # SNV AUTO: 9.5 X10^3/UL (ref 4.8–10.8)
NEUTROPHILS NFR BLD AUTO: 89.7 %
PDW BLD AUTO: 7.8 FL (ref 7.4–11.4)
PLATELET # BLD: 173 10^3/UL (ref 130–450)
PROT SPEC-MCNC: 5.3 G/DL (ref 6.7–8.2)
RBC MAR: 2.89 10^6/UL (ref 4.7–6.1)
SODIUM SERPLBLD-SCNC: 133 MMOL/L (ref 135–145)

## 2018-10-04 PROCEDURE — 99283 EMERGENCY DEPT VISIT LOW MDM: CPT

## 2018-10-04 PROCEDURE — 80053 COMPREHEN METABOLIC PANEL: CPT

## 2018-10-04 PROCEDURE — 85025 COMPLETE CBC W/AUTO DIFF WBC: CPT

## 2018-10-04 PROCEDURE — 96361 HYDRATE IV INFUSION ADD-ON: CPT

## 2018-10-04 PROCEDURE — 83690 ASSAY OF LIPASE: CPT

## 2018-10-04 PROCEDURE — 74174 CTA ABD&PLVS W/CONTRAST: CPT

## 2018-10-04 PROCEDURE — 96360 HYDRATION IV INFUSION INIT: CPT

## 2018-10-04 PROCEDURE — 36415 COLL VENOUS BLD VENIPUNCTURE: CPT

## 2018-10-04 PROCEDURE — 99284 EMERGENCY DEPT VISIT MOD MDM: CPT

## 2018-10-04 NOTE — ED PHYSICIAN DOCUMENTATION
PD HPI ABD PAIN





- Stated complaint


Stated Complaint: ABD PAIN





- Chief complaint


Chief Complaint: Abd Pain





- History obtained from


History obtained from: Patient, EMS





- History of Present Illness


Timing - onset: Chronic


Timing - duration: Years


Timing - details: Intermittant


Pain level max: 8


Pain level now: 7


Quality: Aching, Pain


Location: All over / everywhere


Radiation: Other (Low back)


Improved by: Other (nothing)


Worsened by: Eating


Associated symptoms: No: Fever, Nausea, Vomiting, Hematemesis, Diarrhea, 

Constipation, Melena, Hematochezia, Dysuria, Hematuria


Similar symptoms before: Diagnosis (Chronic abdominal pain, unclear etiology)


Recently seen: Admitted (Patient was discharged from the hospital yesterday 

after being treated for UTI)





Review of Systems


Ten Systems: 10 systems reviewed and negative


Constitutional: denies: Fever, Chills


Nose: denies: Rhinorrhea / runny nose, Congestion


Respiratory: denies: Cough


GI: denies: Vomiting, Constipation, Diarrhea, Hematemesis, Bloody / black stool


: denies: Dysuria


Skin: denies: Rash


Musculoskeletal: reports: Back pain (Chronic back pain, unchanged).  denies: 

Neck pain


Neurologic: reports: Generalized weakness.  denies: Focal weakness, Numbness





PD PAST MEDICAL HISTORY





- Past Medical History


Cardiovascular: Atrial fibrillation


Respiratory: None


Neuro: Dementia, CVA


Endocrine/Autoimmune: HyPOthyroidism


GI: None


: Benign prostate hypertrophy


HEENT: Chronic hearing loss


Psych: None


Musculoskeletal: Osteoarthritis


Derm: None





- Past Surgical History


Past Surgical History: Yes


Ortho: Hip replacement





- Present Medications


Home Medications: 


                                Ambulatory Orders











 Medication  Instructions  Recorded  Confirmed


 


Aspirin 81 mg PO DAILY 07/16/17 10/02/18


 


Tamsulosin HCl 0.4 mg PO DAILY 07/16/17 10/02/18


 


Zolpidem Tartrate 10 mg PO QPM PRN 07/16/17 10/02/18


 


Latanoprost 0.005% Ophth Drops 1 drops EACHEYE QPM 02/17/18 10/02/18





[Xalatan Ophth Drops]   


 


Timolol 0.5% Ophth Drops [Timoptic 1 drops EACHEYE DAILY 02/17/18 10/02/18





0.5% Ophth Drops]   


 


Doxazosin [Cardura] 4 mg PO QPM 06/25/18 10/02/18


 


Levothyroxine Sodium [Synthroid] 100 mcg PO QDAC 07/07/18 10/02/18


 


Pilocarpine HCl 5 mg PO TID 07/07/18 10/02/18


 


raNITIdine [Zantac] 150 mg PO DAILY 07/07/18 10/02/18


 


Docusate Sodium 100 mg PO DAILY #30 capsule 08/26/18 10/02/18


 


Atorvastatin Calcium 40 mg PO QPM 09/22/18 10/02/18


 


Carvedilol 3.125 mg PO BID 09/22/18 10/02/18


 


Fluticasone [Flonase] 1 sprays TRAE DAILY 09/22/18 10/02/18


 


Furosemide 20 mg PO DAILY 09/22/18 10/02/18


 


Lisinopril 5 mg PO DAILY 09/22/18 10/02/18


 


Ferrous Sulfate 325 mg PO DAILY #30 tablet 09/23/18 10/02/18


 


Ipratropium [Atrovent] 2 puffs IH BID 10/02/18 10/02/18


 


Nitrofurantoin [Macrobid] 100 mg PO BID #12 capsule 10/03/18 














- Allergies


Allergies/Adverse Reactions: 


                                    Allergies











Allergy/AdvReac Type Severity Reaction Status Date / Time


 


No Known Drug Allergies Allergy   Verified 10/02/18 13:10














- Social History


Does the pt smoke?: No


Smoking Status: Never smoker


Does the pt drink ETOH?: No


Does the pt have substance abuse?: No





- Immunizations


Immunizations are current?: Yes





- POLST


Patient has POLST: No


POLST Status: DNR





PD ED PE NORMAL





- Vitals


Vital signs reviewed: Yes





- General


General: Alert and oriented X 3, No acute distress





- HEENT


HEENT: Ears normal, Moist mucous membranes, Pharynx benign





- Neck


Neck: Supple, no meningeal sign





- Cardiac


Cardiac: RRR, Strong equal pulses





- Respiratory


Respiratory: No respiratory distress, Clear bilaterally





- Abdomen


Abdomen: Soft, Non distended, Other (Mild diffusely tender to palpation without 

peritoneal signs)





- Back


Back: No CVA TTP, No spinal TTP





- Derm


Derm: Warm and dry





- Extremities


Extremities: No edema





- Neuro


Neuro: Alert and oriented X 3





- Psych


Psych: Normal mood, Normal affect





Results





- Vitals


Vitals: 


                               Vital Signs - 24 hr











  10/04/18





  18:39


 


Temperature 37.1 C


 


Heart Rate 66


 


Respiratory 18





Rate 


 


Blood Pressure 131/67 H


 


O2 Saturation 100








                                     Oxygen











O2 Source [With Activity]      Room air


 


O2 Source                      Room air

















- Labs


Labs: 


                                Laboratory Tests











  10/04/18 10/04/18





  19:20 19:20


 


WBC  9.5 


 


RBC  2.89 L 


 


Hgb  8.8 L 


 


Hct  26.5 L 


 


MCV  91.7 


 


MCH  30.6 


 


MCHC  33.4 


 


RDW  14.7 


 


Plt Count  173 


 


MPV  7.8 


 


Neut # (Auto)  8.5 H 


 


Lymph # (Auto)  0.4 L 


 


Mono # (Auto)  0.4 


 


Eos # (Auto)  0.1 


 


Baso # (Auto)  0.0 


 


Absolute Nucleated RBC  0.00 


 


Nucleated RBC %  0.0 


 


Sodium   133 L


 


Potassium   3.2 L


 


Chloride   105


 


Carbon Dioxide   21


 


Anion Gap   7.0


 


BUN   29 H


 


Creatinine   1.5 H


 


Estimated GFR (MDRD)   44 L


 


Glucose   136 H


 


Calcium   8.2 L


 


Total Bilirubin   0.6


 


AST   22


 


ALT   20


 


Alkaline Phosphatase   64


 


Total Protein   5.3 L


 


Albumin   2.9 L


 


Globulin   2.4


 


Albumin/Globulin Ratio   1.2


 


Lipase   26














- Rads (name of study)


  ** CT abdomen and pelvis angio


Radiology: Prelim report reviewed, EMP read contemporaneously, See rad report 

(No definite findings to explain clinical symptoms. Advanced atherosclerotic 

vascular disease including abdominal aortic aneurysm and previous aorto biiliac 

stenting.  Severe stenosis of the celiac axis.  Prominent atherosclerotic 

vascular calcifications of the SMA.  No evidence for endovascular leak nor retr

operitoneal bleed.  No significant change in the appearance of the abdominal 

aortic aneurysm.  Degenerative changes of the spine and right hip)





PD MEDICAL DECISION MAKING





- ED course


Complexity details: reviewed old records, reviewed results, re-evaluated 

patient, considered differential, d/w patient


ED course: 





Symptoms resolved with IV fluid.  Suspect that as he becomes dehydrated, the 

mesenteric insufficiency worsens and causes pain.  Does not appear to have any 

acute occlusions tonight.  We will have him follow-up closely with his doctor in

the next few days for referral to either vascular surgery or interventional 

radiology to see if there is a stentable the lesion.  Patient counseled 

regarding signs and symptoms for which I believe and urgent re-evaluation would 

be necessary. Patient with good understanding of and agreement to plan and is 

comfortable going home at this time





This document was made in part using voice recognition software. While efforts 

are made to proofread this document, sound alike and grammatical errors may 

occur.





- Sepsis Event


Vital Signs: 


                               Vital Signs - 24 hr











  10/04/18





  18:39


 


Temperature 37.1 C


 


Heart Rate 66


 


Respiratory 18





Rate 


 


Blood Pressure 131/67 H


 


O2 Saturation 100








                                     Oxygen











O2 Source [With Activity]      Room air


 


O2 Source                      Room air

















Departure





- Departure


Disposition: 01 Home, Self Care


Clinical Impression: 


 Acute on chronic renal insufficiency, Chronic mesenteric insufficiency





Abdominal pain


Qualifiers:


 Abdominal location: generalized Qualified Code(s): R10.84 - Generalized 

abdominal pain





Condition: Good


Instructions:  ED PVD


Follow-Up: 


Cesario Jay DO [Primary Care Provider] - Within 3 Days


Comments: 


You have chronic atherosclerotic disease in your abdomen. You need to drink lots

of water to keep your blood volume up so you do not develop abdominal pain. You 

have severe stenosis of the celiac axis. It is unknown if vascular surgery would

help this. Your doctor can refer you for further care. Eat small meals 

frequently.

## 2018-10-05 NOTE — CT REPORT
Reason:  diffuse abd pain, h/o AAA, SMA stent

Procedure Date:  10/04/2018   

Accession Number:  [id number] / [id number]                    

Procedure:  CT  - Abdomen/Pelvis Angio CPT Code:  

 

FULL RESULT:

 

 

EXAM:

CT ANGIOGRAM ABDOMEN AND PELVIS WITH CONTRAST

 

EXAM DATE: 10/4/2018 08:25 PM.

 

CLINICAL HISTORY: Diffuse abdominal pain, history of AAA, SMA stent.

 

COMPARISONS: Abdomen and pelvis without contrast 09/22/2018 9:38 AM.

 

TECHNIQUE: Routine helical CT angiogram imaging was performed through the 

abdomen and pelvis in the arterial phase. IV contrast: 80 mL  Isovue-300. 

Enteric contrast: No. Reconstructions: Coronal, sagittal, and 3D MIP 

reconstructions.

 

In accordance with CT protocol optimization, one or more of the following 

dose reduction techniques were utilized for this exam: automated exposure 

control, adjustment of mA and/or KV based on patient size, or use of 

iterative reconstructive technique.

 

FINDINGS:

Vasculature: Normal. No aneurysm, dissection, or significant 

atherosclerotic disease of the abdominal aorta and iliac arteries. The 

visualized mesenteric and solid organ vascular structures are also within 

normal limits.

 

Lung Bases: There are chronic changes at the lung bases. In addition 

there are small bilateral pleural effusions. There are atherosclerotic 

vascular calcifications of the coronary arteries.

 

Abdominal Solid Organs: The liver is grossly unremarkable. The 

gallbladder is distended. There is no cholelithiasis. There is no hepatic 

ductal dilatation. The spleen is normal. The adrenal glands are normal. 

The pancreas is slightly atrophic and otherwise unremarkable.

 

The kidneys are normally positioned. On this arterial phase examination 

there is no significant enhancement of the kidneys.

 

There is a mid abdominal aortic aneurysm status post stenting. The 

diameter of the native aneurysm measures 4.5 cm x 4.1 cm. This is similar 

to the recent prior exam. The superior aspect of this stent is above the 

level of the celiac axis. There is stenosis of the celiac axis. There is 

flow within the splenic artery and the hepatic artery. There is prominent 

atherosclerotic vascular calcifications of the SMA. There is flow within 

the SMA.

 

There is no definite evidence for endovascular leak. There is no evidence 

for retroperitoneal leak.

 

The stents extend into both common iliac arteries.

 

Peritoneal Cavity: There is no free fluid in the abdomen and pelvis. 

There is no evidence for bowel distention.

 

Pelvic Organs: The urinary bladder is grossly unremarkable. There are 

calcifications within the prostate.

 

Bones: There are advanced degenerative changes of the thoracolumbar spine 

and degenerative changes at the right hip. There is a total left hip 

replacement.

 

Other: None.

IMPRESSION:

 

1. No definite findings to explain clinical symptoms.

 

2. Advanced atherosclerotic vascular disease including abdominal aortic 

aneurysm and previous aortobi-iliac stenting. There is severe stenosis of 

the celiac axis. There is prominent atherosclerotic vascular 

calcifications of the SMA. No evidence for endovascular leak nor 

retroperitoneal bleed. No significant change in the appearance of the 

abdominal aortic aneurysm.

 

3. Degenerative changes of the spine and the right hip.

 

[facility]

## 2018-10-11 ENCOUNTER — HOSPITAL ENCOUNTER (INPATIENT)
Dept: HOSPITAL 76 - ED | Age: 83
LOS: 7 days | Discharge: HOSPICE | DRG: 640 | End: 2018-10-18
Attending: NURSE PRACTITIONER | Admitting: INTERNAL MEDICINE
Payer: MEDICARE

## 2018-10-11 ENCOUNTER — HOSPITAL ENCOUNTER (OUTPATIENT)
Dept: HOSPITAL 76 - EMS | Age: 83
Discharge: ANOTHER HOSPITAL | End: 2018-10-11
Attending: SURGERY
Payer: MEDICARE
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
## 2018-10-11 DIAGNOSIS — H91.90: ICD-10-CM

## 2018-10-11 DIAGNOSIS — R53.1: ICD-10-CM

## 2018-10-11 DIAGNOSIS — I50.42: ICD-10-CM

## 2018-10-11 DIAGNOSIS — N18.3: ICD-10-CM
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
## 2018-10-11 DIAGNOSIS — Z87.891: ICD-10-CM

## 2018-10-11 DIAGNOSIS — I50.20: ICD-10-CM

## 2018-10-11 DIAGNOSIS — N40.0: ICD-10-CM

## 2018-10-11 DIAGNOSIS — Z87.440: ICD-10-CM

## 2018-10-11 DIAGNOSIS — E78.00: ICD-10-CM

## 2018-10-11 DIAGNOSIS — I69.993: ICD-10-CM

## 2018-10-11 DIAGNOSIS — F03.90: ICD-10-CM

## 2018-10-11 DIAGNOSIS — I48.91: ICD-10-CM

## 2018-10-11 DIAGNOSIS — Z95.828: ICD-10-CM

## 2018-10-11 DIAGNOSIS — Z79.82: ICD-10-CM

## 2018-10-11 DIAGNOSIS — Z66: ICD-10-CM

## 2018-10-11 DIAGNOSIS — D64.9: ICD-10-CM

## 2018-10-11 DIAGNOSIS — R29.6: ICD-10-CM

## 2018-10-11 DIAGNOSIS — I13.0: ICD-10-CM

## 2018-10-11 DIAGNOSIS — G47.33: ICD-10-CM

## 2018-10-11 DIAGNOSIS — E44.0: ICD-10-CM

## 2018-10-11 DIAGNOSIS — K55.059: ICD-10-CM

## 2018-10-11 DIAGNOSIS — M19.90: ICD-10-CM

## 2018-10-11 DIAGNOSIS — E87.6: Primary | ICD-10-CM

## 2018-10-11 DIAGNOSIS — M54.9: Primary | ICD-10-CM

## 2018-10-11 DIAGNOSIS — Z96.649: ICD-10-CM

## 2018-10-11 DIAGNOSIS — Z51.5: ICD-10-CM

## 2018-10-11 DIAGNOSIS — E86.0: ICD-10-CM

## 2018-10-11 DIAGNOSIS — E03.9: ICD-10-CM

## 2018-10-11 DIAGNOSIS — E83.42: ICD-10-CM

## 2018-10-11 LAB
ALBUMIN DIAFP-MCNC: 2.8 G/DL (ref 3.2–5.5)
ALBUMIN/GLOB SERPL: 1.2 {RATIO} (ref 1–2.2)
ALP SERPL-CCNC: 66 IU/L (ref 42–121)
ALT SERPL W P-5'-P-CCNC: 21 IU/L (ref 10–60)
ANION GAP SERPL CALCULATED.4IONS-SCNC: 10 MMOL/L (ref 6–13)
AST SERPL W P-5'-P-CCNC: 16 IU/L (ref 10–42)
BASOPHILS NFR BLD AUTO: 0.1 10^3/UL (ref 0–0.1)
BASOPHILS NFR BLD AUTO: 1.3 %
BILIRUB BLD-MCNC: 0.8 MG/DL (ref 0.2–1)
BUN SERPL-MCNC: 15 MG/DL (ref 6–20)
CALCIUM UR-MCNC: 7.9 MG/DL (ref 8.5–10.3)
CHLORIDE SERPL-SCNC: 107 MMOL/L (ref 101–111)
CLARITY UR REFRACT.AUTO: CLEAR
CO2 SERPL-SCNC: 23 MMOL/L (ref 21–32)
CREAT SERPLBLD-SCNC: 1.2 MG/DL (ref 0.6–1.2)
EOSINOPHIL # BLD AUTO: 0 10^3/UL (ref 0–0.7)
EOSINOPHIL NFR BLD AUTO: 0.4 %
ERYTHROCYTE [DISTWIDTH] IN BLOOD BY AUTOMATED COUNT: 15.1 % (ref 12–15)
GFRSERPLBLD MDRD-ARVRAT: 57 ML/MIN/{1.73_M2} (ref 89–?)
GLOBULIN SER-MCNC: 2.3 G/DL (ref 2.1–4.2)
GLUCOSE SERPL-MCNC: 101 MG/DL (ref 70–100)
GLUCOSE UR QL STRIP.AUTO: NEGATIVE MG/DL
HGB UR QL STRIP: 9 G/DL (ref 14–18)
KETONES UR QL STRIP.AUTO: 15 MG/DL
LIPASE SERPL-CCNC: 18 U/L (ref 22–51)
LYMPHOCYTES # SPEC AUTO: 0.8 10^3/UL (ref 1.5–3.5)
LYMPHOCYTES NFR BLD AUTO: 12.3 %
MAGNESIUM SERPL-MCNC: 1.6 MG/DL (ref 1.7–2.8)
MCH RBC QN AUTO: 31.2 PG (ref 27–31)
MCHC RBC AUTO-ENTMCNC: 33.7 G/DL (ref 32–36)
MCV RBC AUTO: 92.8 FL (ref 80–94)
MONOCYTES # BLD AUTO: 0.7 10^3/UL (ref 0–1)
MONOCYTES NFR BLD AUTO: 9.8 %
NEUTROPHILS # BLD AUTO: 5.1 10^3/UL (ref 1.5–6.6)
NEUTROPHILS # SNV AUTO: 6.7 X10^3/UL (ref 4.8–10.8)
NEUTROPHILS NFR BLD AUTO: 76.2 %
NITRITE UR QL STRIP.AUTO: NEGATIVE
PDW BLD AUTO: 7.9 FL (ref 7.4–11.4)
PH UR STRIP.AUTO: 6 PH (ref 5–7.5)
PLATELET # BLD: 204 10^3/UL (ref 130–450)
PROT SPEC-MCNC: 5.1 G/DL (ref 6.7–8.2)
PROT UR STRIP.AUTO-MCNC: (no result) MG/DL
RBC # UR STRIP.AUTO: NEGATIVE /UL
RBC MAR: 2.87 10^6/UL (ref 4.7–6.1)
SODIUM SERPLBLD-SCNC: 140 MMOL/L (ref 135–145)
SP GR UR STRIP.AUTO: 1.02 (ref 1–1.03)
SQUAMOUS URNS QL MICRO: (no result)
UROBILINOGEN UR QL STRIP.AUTO: (no result) E.U./DL
UROBILINOGEN UR STRIP.AUTO-MCNC: NEGATIVE MG/DL

## 2018-10-11 PROCEDURE — 99221 1ST HOSP IP/OBS SF/LOW 40: CPT

## 2018-10-11 PROCEDURE — 81001 URINALYSIS AUTO W/SCOPE: CPT

## 2018-10-11 PROCEDURE — 36415 COLL VENOUS BLD VENIPUNCTURE: CPT

## 2018-10-11 PROCEDURE — 84100 ASSAY OF PHOSPHORUS: CPT

## 2018-10-11 PROCEDURE — 85025 COMPLETE CBC W/AUTO DIFF WBC: CPT

## 2018-10-11 PROCEDURE — 87086 URINE CULTURE/COLONY COUNT: CPT

## 2018-10-11 PROCEDURE — 96372 THER/PROPH/DIAG INJ SC/IM: CPT

## 2018-10-11 PROCEDURE — 83690 ASSAY OF LIPASE: CPT

## 2018-10-11 PROCEDURE — 96376 TX/PRO/DX INJ SAME DRUG ADON: CPT

## 2018-10-11 PROCEDURE — 83735 ASSAY OF MAGNESIUM: CPT

## 2018-10-11 PROCEDURE — 81003 URINALYSIS AUTO W/O SCOPE: CPT

## 2018-10-11 PROCEDURE — 84443 ASSAY THYROID STIM HORMONE: CPT

## 2018-10-11 PROCEDURE — 80053 COMPREHEN METABOLIC PANEL: CPT

## 2018-10-11 PROCEDURE — 96365 THER/PROPH/DIAG IV INF INIT: CPT

## 2018-10-11 PROCEDURE — 99283 EMERGENCY DEPT VISIT LOW MDM: CPT

## 2018-10-11 PROCEDURE — 96361 HYDRATE IV INFUSION ADD-ON: CPT

## 2018-10-11 PROCEDURE — 96366 THER/PROPH/DIAG IV INF ADDON: CPT

## 2018-10-11 PROCEDURE — 83605 ASSAY OF LACTIC ACID: CPT

## 2018-10-11 PROCEDURE — 99284 EMERGENCY DEPT VISIT MOD MDM: CPT

## 2018-10-11 NOTE — ED PHYSICIAN DOCUMENTATION
PD HPI ALTERED MENTAL STATUS





- Stated complaint


Stated Complaint: GLF - BACK PAIN





- Chief complaint


Chief Complaint: General





- History obtained from


History obtained from: Patient, Family





- History of Present Illness


Timing - onset: How many days ago (2-3)


Timing - duration: Days (2-3)


Timing - details: Gradual onset (He has had feeling of general weakness and 

malaise with less appetite and intake over the last few days.  Today he was 

walking with his walker trying to get to bed and felt that his legs just gave 

out on him.  He slumped to the floor without any acute abrupt fall or injury per

se but did go to the ground and was unable to get up off the floor.  His wife 

called EMS for assistance.  He feels that he is unable to ambulate himself at 

this point even with a walker.  He denies any focal weakness.)


Quality / character: Other (general weakness.)


Associated symptoms: NVD (nausea with less appetite/intake, but no vomiting. He 

says he has had some loose stools, but not overt diarrhea.).  No: Fever, 

Headache, Dyspnea, Cough


Contributing factors: No: New medication, Recent med change, Recent illness 

(seen in ER a week ago for abd pain that improved with IV fluids and felt 

related to his chronic mesenteric ischemia. He denies change in meds.)


Basline status: Alert and oriented X 3, Walker


Similar symptoms before: Has not had sx before


Recently seen: Clinic (for abd pain.)





Review of Systems


Constitutional: reports: Fatigue.  denies: Fever, Chills, Myalgias


Ears: reports: Loss of hearing


Nose: denies: Rhinorrhea / runny nose, Congestion


Throat: denies: Sore throat


Cardiac: denies: Chest pain / pressure, Palpitations


Respiratory: reports: Dyspnea.  denies: Cough, Wheezing


GI: reports: Nausea, Diarrhea (loose but not overt diarrhea per se).  denies: 

Abdominal Pain (not the past week, but often prior.), Abdominal Swelling, 

Vomiting, Constipation, Hematemesis, Bloody / black stool


: denies: Dysuria


Skin: denies: Rash, Lesions


Musculoskeletal: denies: Neck pain, Back pain


Neurologic: reports: Generalized weakness.  denies: Focal weakness, Numbness, 

Near syncope, Confused, Headache


Endocrine: reports: Weight loss, Easy bruising / bleeding





PD PAST MEDICAL HISTORY





- Past Medical History


Cardiovascular: Atrial fibrillation, Other (intestinal ischemia)


Respiratory: None


Neuro: Dementia, CVA


Endocrine/Autoimmune: HyPOthyroidism


GI: None


: Benign prostate hypertrophy


HEENT: Chronic hearing loss


Psych: None


Musculoskeletal: Osteoarthritis


Derm: None





- Past Surgical History


Past Surgical History: Yes


Ortho: Hip replacement





- Present Medications


Home Medications: 


                                Ambulatory Orders











 Medication  Instructions  Recorded  Confirmed


 


Aspirin 81 mg PO DAILY 07/16/17 10/02/18


 


Tamsulosin HCl 0.4 mg PO DAILY 07/16/17 10/02/18


 


Zolpidem Tartrate 10 mg PO QPM PRN 07/16/17 10/02/18


 


Latanoprost 0.005% Ophth Drops 1 drops EACHEYE QPM 02/17/18 10/02/18





[Xalatan Ophth Drops]   


 


Timolol 0.5% Ophth Drops [Timoptic 1 drops EACHEYE DAILY 02/17/18 10/02/18





0.5% Ophth Drops]   


 


Doxazosin [Cardura] 4 mg PO QPM 06/25/18 10/02/18


 


Levothyroxine Sodium [Synthroid] 100 mcg PO QDAC 07/07/18 10/02/18


 


Pilocarpine HCl 5 mg PO TID 07/07/18 10/02/18


 


raNITIdine [Zantac] 150 mg PO DAILY 07/07/18 10/02/18


 


Docusate Sodium 100 mg PO DAILY #30 capsule 08/26/18 10/02/18


 


Atorvastatin Calcium 40 mg PO QPM 09/22/18 10/02/18


 


Carvedilol 3.125 mg PO BID 09/22/18 10/02/18


 


Fluticasone [Flonase] 1 sprays TRAE DAILY 09/22/18 10/02/18


 


Furosemide 20 mg PO DAILY 09/22/18 10/02/18


 


Lisinopril 5 mg PO DAILY 09/22/18 10/02/18


 


Ferrous Sulfate 325 mg PO DAILY #30 tablet 09/23/18 10/02/18


 


Ipratropium [Atrovent] 2 puffs IH BID 10/02/18 10/02/18


 


Nitrofurantoin [Macrobid] 100 mg PO BID #12 capsule 10/03/18 














- Allergies


Allergies/Adverse Reactions: 


                                    Allergies











Allergy/AdvReac Type Severity Reaction Status Date / Time


 


No Known Drug Allergies Allergy   Verified 10/11/18 19:46














- Social History


Does the pt smoke?: No


Smoking Status: Never smoker


Does the pt drink ETOH?: No


Does the pt have substance abuse?: No





- Family History


Family history: reports: Non contributory





- Immunizations


Immunizations are current?: Yes





- POLST


Patient has POLST: No


POLST Status: DNR





PD ED PE NORMAL





- Vitals


Vital signs reviewed: Yes





- General


General: Alert and oriented X 3, No acute distress, Well developed/nourished, 

Other (dried lips)





- HEENT


HEENT: Pharynx benign.  No: Moist mucous membranes





- Neck


Neck: Supple, no meningeal sign, No adenopathy, No JVD





- Cardiac


Cardiac: RRR, No murmur





- Respiratory


Respiratory: Clear bilaterally





- Abdomen


Abdomen: Soft, Non tender, Non distended.  No: Normal bowel sounds (decreased)





- Male 


Male : Deferred





- Rectal


Rectal: Other (rectal showing some stool in vault, guiac negative. )





- Back


Back: No CVA TTP





- Derm


Derm: Warm and dry.  No: Normal color (pale)





- Extremities


Extremities: No tenderness to palpate, Normal ROM s pain, No edema, No calf 

tenderness / cord





- Neuro


Neuro: Alert and oriented X 3, No motor deficit, Normal speech, Other (he needed

assistance to get out of bed and to bathroom in same room )


Eye Opening: Spontaneous


Motor: Obeys Commands


Verbal: Oriented


GCS Score: 15





Results





- Vitals


Vitals: 


                               Vital Signs - 24 hr











  10/11/18





  19:46


 


Temperature 36.8 C


 


Heart Rate 90


 


Respiratory 16





Rate 


 


Blood Pressure 146/70 H


 


O2 Saturation 100








                                     Oxygen











O2 Source [With Activity]      Room air


 


O2 Source                      Room air

















PD MEDICAL DECISION MAKING





- ED course


Complexity details: considered differential (No injury with this fall and had 

just slumped to the floor.  He is showing general weakness.  He has had less 

intake and perhaps some loose stool/mild diarrhea.  His electrolytes are showing

significantly low potassium level which is acute.  He has not had any intestinal

ischemia type pains in the last week.  He is given some IV fluids here as well 

as potassium supplementation.  However will need to continue this potassium 

supplement dictation at least overnight and see how his symptoms improve.), d/w 

patient





Departure





- Departure


Disposition: ED Place in Observation


Clinical Impression: 


 Generalized weakness, Hypokalemia





Fall


Qualifiers:


 Encounter type: initial encounter Qualified Code(s): W19.XXXA - Unspecified 

fall, initial encounter





Condition: Stable


Record reviewed to determine appropriate education?: Yes

## 2018-10-11 NOTE — HISTORY & PHYSICAL EXAMINATION
Chief Complaint





- Chief Complaint


Chief Complaint: Generalized Weakness





History of Present Illness





- Admitted From


Admitted From:: Emergency Department





- History Obtained From


Records Reviewed: Yes


History obtained from: Patient and patients wife


Exam Limitations: None





- History of Present Illness


HPI Comment/Other: 





Patient is an 89-year-old gentleman with a past medical history significant for 

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 

recent echocardiogram showing an ejection fraction of 40-45%, abdominal aortic 

aneurysm status post stent, likely mesenteric ischemia with chronic abdominal 

pain, osteoarthritis, hypertension, obstructive sleep apnea, hypothyroidism, 

chronic back pain, CKD stage III, history of CVA, dementia, BPH and atrial 

fibrillation who presented to the emergency department with a chief complaint of

generalized weakness.  The patient has been hospitalized twice in the past month

for abdominal pain and is thought to have chronic mesenteric ischemia which 

seems to cause increased abdominal pain when the patient becomes dehydrated or 

hypotensive.  The patient was most recently in the emergency department with 

abdominal pain 1 week earlier.  The patient has since been diagnosed with a 

urinary tract infection and was started on nitrofurantoin.  The patient states 

that over the last week he has had decreased appetite with decreased p.o. intake

of fluids and food.  He states that his mouth is been extremely dry and he has 

been having increased urinary frequency.  The patient states that today when he 

was walking he fell to the floor because of weakness and could not get back up. 

The patient states that he was walking with his walker to his bed and states he 

was so weak he just could not move.  He states that he felt his legs giving out 

on him and slowly fell to the floor.  The patient denies hitting his head and 

does admit to some back pain.  When the patient's wife saw him lying on the 

floor she immediately called EMS.  The patient was brought to the emergency 

department.  The patient denies any fevers or chills.  He denies any abdominal 

pain, nausea, vomiting, diarrhea or any constipation.





Patient denies any headaches, blurred vision, runny nose, sore throat, nasal 

congestion, difficulty swallowing, chest pain, shortness of air, orthopnea, PND,

increased lower extremity swelling, dysuria, joint swelling, muscle aches, neck 

stiffness, recent unintentional weight loss, skin rash, skin changes, hair loss,

night sweats, polyuria, polydipsia or any focal neurologic deficits.





On presentation to the emergency department the patient was afebrile and heart 

rate was slightly elevated at 90 patient's blood pressure was normal and he was 

not in any respiratory distress.  The patient underwent routine lab work which 

revealed no leukocytosis, a chronic anemia, a mildly decreased magnesium level 

of 1.6 and a severely decreased potassium of 2.3.  The patient has been on Lasix

at home for his congestive heart failure and this in combination with the fact 

the patient has had poor appetite and poor oral intake it appears that he is 

developed severe hypokalemia.  The patient's UA was negative for ongoing 

infection.  The patient was placed in observation for potassium replacement and 

IV fluids as he appeared to be dehydrated on examination.





History





- Past Medical History


Cardiovascular: reports: Congestive heart failure, Hypertension, High 

cholesterol, Atrial fibrillation


Respiratory: reports: None


Neuro: reports: Dementia, CVA


Endocrine/Autoimmune: reports: HyPOthyroidism


GI: reports: None


: reports: Benign prostate hypertrophy


HEENT: reports: Chronic hearing loss


Psych: reports: None


Musculoskeletal: reports: Osteoarthritis


Derm: reports: None


MRSA Hx?: No





- Past Surgical History


Ortho: reports: Hip replacement





- Family & Social History


Family History: Mother: , CVA/TIA, Father: , CAD, COPD/Emphysema


Living arrangement: At home


Living Situation: With spouse/s.o.


Social History Notes: The patient lives in [place] with his wife.  He has 

been  to his wife for 68 years and they have been living on [facility] for 60 years.  The patient was born in [place] and left [place] to join

the  at the age of 20 and served in the [employer] for 20 years.  After 

leaving the [employer] the patient worked as a air  with the 

[employer] here in [place].  After he retired he remained on [facility].  He 

had his wife have 3 children all boys.  1 of his sons lives in [place], one 

lives in [place] and one lives in [facility].  The patient states that 

he did smoke cigarettes for about 10 years but quit in his 40s.  He denies any 

alcohol or illicit drug use.





- POLST


Patient has POLST: No


POLST Status: DNR





Meds/Allgy





- Home Medications


Home Medications: 


                                Ambulatory Orders











 Medication  Instructions  Recorded  Confirmed


 


Aspirin 81 mg PO DAILY 07/16/17 10/02/18


 


Tamsulosin HCl 0.4 mg PO DAILY 07/16/17 10/02/18


 


Zolpidem Tartrate 10 mg PO QPM PRN 07/16/17 10/02/18


 


Latanoprost 0.005% Ophth Drops 1 drops EACHEYE QPM 02/17/18 10/02/18





[Xalatan Ophth Drops]   


 


Timolol 0.5% Ophth Drops [Timoptic 1 drops EACHEYE DAILY 02/17/18 10/02/18





0.5% Ophth Drops]   


 


Doxazosin [Cardura] 4 mg PO QPM 06/25/18 10/02/18


 


Levothyroxine Sodium [Synthroid] 100 mcg PO QDAC 07/07/18 10/02/18


 


Pilocarpine HCl 5 mg PO TID 07/07/18 10/02/18


 


raNITIdine [Zantac] 150 mg PO DAILY 07/07/18 10/02/18


 


Docusate Sodium 100 mg PO DAILY #30 capsule 08/26/18 10/02/18


 


Atorvastatin Calcium 40 mg PO QPM 09/22/18 10/02/18


 


Carvedilol 3.125 mg PO BID 09/22/18 10/02/18


 


Fluticasone [Flonase] 1 sprays TRAE DAILY 09/22/18 10/02/18


 


Furosemide 20 mg PO DAILY 09/22/18 10/02/18


 


Lisinopril 5 mg PO DAILY 09/22/18 10/02/18


 


Ferrous Sulfate 325 mg PO DAILY #30 tablet 09/23/18 10/02/18


 


Ipratropium [Atrovent] 2 puffs IH BID 10/02/18 10/02/18


 


Nitrofurantoin [Macrobid] 100 mg PO BID #12 capsule 10/03/18 














- Allergies


Allergies/Adverse Reactions: 


                                    Allergies











Allergy/AdvReac Type Severity Reaction Status Date / Time


 


No Known Drug Allergies Allergy   Verified 10/11/18 19:46














Review of Systems





- Other Findings


Other Findings: 





A comprehensive review of systems was performed the pertinent positives and 

negatives are stated above in the HPI and the remainder of the review of systems

 is negative.


Prior Level of Functionality: 





The patient uses a walker at home and lives with his wife.  He has his wife 

support for his activities of daily living but is still partially independent.  

He appears to be declining over the last several months and has been requiring 

increasing number of hospitalizations.  It does not appear that the patient's 

wife alone is going to be able to continue to take care of the patient.  They do

 appear to need caregiver support.





Exam





- Vital Signs


Reviewed Vital Signs: Yes


Vital Signs: 





                                Vital Signs x48h











  Temp Pulse Resp BP Pulse Ox


 


 10/11/18 21:48  36.3 C L  72  22  125/69  98


 


 10/11/18 19:46  36.8 C  90  16  146/70 H  100














- Physical Exam


General Appearance: positive: No acute distress, Alert, Other (Patient appears 

elderly, cachectic and very dry on exam.)


Eyes Bilateral: positive: Normal inspection, PERRL, EOMI, No lid inflammation, 

Conjunctivae nml, No scleral icterus


ENT: positive: ENT inspection nml, Pharynx nml, Dry mucous membranes.  negative:

 Purulent nasal drainage, Pharyngeal erythema, Oral lesions


Neck: positive: Nml inspection, Thyroid nml, No JVD, Trachea midline.  negative:

 Thyromegaly, Lymphadenopathy (R), Lymphadenopathy (L), Stiff neck, Kernig's 

sign, Carotid bruit, Tracheal deviation


Respiratory: positive: Chest non-tender, No respiratory distress, Breath sounds 

nml.  negative: Wheezes, Rales, Rhonchi


Cardiovascular: positive: No murmur, No gallop, Irregularly irregular


Peripheral Pulses: positive: 2+


Abdomen: positive: No organomegaly, Nml bowel sounds, No distention, Tenderness 

(Patient has abdominal tenderness specifically in the epigastric area but states

 this is nothing new.), Guarding.  negative: Rebound, Hepatomegaly


Back: positive: Nml inspection.  negative: CVA tenderness (R), CVA tenderness 

(L)


Skin: positive: Color nml, No rash, Warm, Dry.  negative: Cyanosis, Diaphoresis,

 Pallor


Extremities: positive: Non-tender, Full ROM, Nml appearance, No pedal edema


Neurologic/Psychiatric: positive: Oriented x3, CN's nml (2-12), Motor nml, 

Sensation nml, Mood/affect nml





Conclusion/Plan





- Problem List


(1) Hypokalemia


Conclusion/Plan: 





Patient presented with generalized weakness and inability to get up off of the 

floor after a fall.  Patient has had poor appetite and poor oral intake over the

 last week.  He has not had any vomiting or diarrhea.  He does admit to some 

nausea.  The symptoms seem to come on after he was started on nitrofurantoin for

 urinary tract infection.  This could be partially side effects from the 

nitrofurantoin.  The patient also has been on Lasix for his congestive heart 

failure which likely also was contributing to hypokalemia.





Plan:


Placed in observation


Replace potassium IV n.p.o.


Monitor on telemetry overnight


Hold nitrofurantoin and Lasix


IV antiemetics for nausea


IV fluids


Monitor potassium








(2) Hypomagnesemia


Conclusion/Plan: 


Along with his hypokalemia the patient also has a hypomagnesemia with a 

magnesium that is slightly low at 1.6.  This is likely also secondary to poor 

appetite and poor oral intake.  Patient will be given magnesium replacement and 

we will continue to monitor his magnesium.








(3) Protein-calorie malnutrition, moderate


Conclusion/Plan: 


Patient appears to have protein calorie malnutrition as his albumin and protein 

are low and patient has a BMI of 16.8 and appears and then seated on examinati

on.  Patient has also had very poor oral intake for months now.  The patient 

appears to be having failure to thrive.





Plan:


Nutrition consult for supplemental nutrition


It may be getting close to time where patient needs a palliative care consult as

 he does appear to be having failure to thrive.








(4) Generalized weakness


Conclusion/Plan: 


Patient presented with generalized weakness.  Patient's generalized weakness 

appears to be secondary to dehydration from poor oral intake and continued 

diuretics.  He was also extremely hypokalemic which could have caused him to be 

weak.  Patient will receive IV fluids along with electrolyte replacement and we 

will get a nutrition consult.  We will also get a physical therapy consultation.








(5) CHF (congestive heart failure)


Conclusion/Plan: 


Patient has chronic congestive heart failure with combined systolic and 

diastolic dysfunction.  Patient has an ejection fraction of 40-45%.  Currently 

the patient appears very dry on examination.  We will give the patient IV fluids

 and electrolyte replacement while he is hospitalized but will still need to be 

careful with fluid given his history of CHF.  The patient will be continued on 

his home lisinopril and Coreg.


Qualifiers: 


   Heart failure type: combined systolic and diastolic   Heart failure 

chronicity: chronic   Qualified Code(s): I50.42 - Chronic combined systolic 

(congestive) and diastolic (congestive) heart failure   





(6) HTN (hypertension)


Conclusion/Plan: 


Patient has a history of hypertension and blood pressure was slightly elevated 

on presentation.  The patient will be continued on his home antihypertensive 

medications will continue to monitor his blood pressure and titrate medications 

as needed.


Qualifiers: 


   Hypertension type: essential hypertension   Qualified Code(s): I10 - 

Essential (primary) hypertension   





(7) Hypothyroidism


Conclusion/Plan: 


The patient has a history of hypothyroidism and is on Synthroid at home.  We 

will continue the patient's Synthroid dose while he is hospitalized.  The 

patient's TSH was last checked just 9 days ago therefore we will not need to do 

another check of his TSH.


Qualifiers: 


   Hypothyroidism type: unspecified   Qualified Code(s): E03.9 - Hypothyroidism,

 unspecified   





- Lab Results


Fish Bones: 


                                 10/11/18 21:34





                                 10/11/18 21:34


Other Lab Results: 








                               Laboratory Results











WBC  6.7 x10^3/uL (4.8-10.8)   10/11/18  21:    


 


RBC  2.87 10^6/uL (4.70-6.10)  L  10/11/18  21:34    


 


Hgb  9.0 g/dL (14.0-18.0)  L  10/11/18  21:    


 


Hct  26.6 % (42.0-52.0)  L  10/11/18  21:34    


 


MCV  92.8 fL (80.0-94.0)   10/11/18  21:    


 


MCH  31.2 pg (27.0-31.0)  H  10/11/18  21:34    


 


MCHC  33.7 g/dL (32.0-36.0)   10/11/18  21:    


 


RDW  15.1 % (12.0-15.0)  H  10/11/18  21:34    


 


Plt Count  204 10^3/uL (130-450)   10/11/18  21:34    


 


MPV  7.9 fL (7.4-11.4)   10/11/18  21:34    


 


Neut # (Auto)  5.1 10^3/uL (1.5-6.6)   10/11/18  21:34    


 


Lymph # (Auto)  0.8 10^3/uL (1.5-3.5)  L  10/11/18  21:34    


 


Mono # (Auto)  0.7 10^3/uL (0.0-1.0)   10/11/18  21:    


 


Eos # (Auto)  0.0 10^3/uL (0.0-0.7)   10/11/18  21:34    


 


Baso # (Auto)  0.1 10^3/uL (0.0-0.1)   10/11/18  21:34    


 


Absolute Nucleated RBC  0.00 x10^3/uL  10/11/18  21:34    


 


Nucleated RBC %  0.0 /100WBC  10/11/18  21:34    


 


Sodium  140 mmol/L (135-145)   10/11/18  21:34    


 


Potassium  2.3 mmol/L (3.5-5.0)  L*  10/11/18  21:34    


 


Chloride  107 mmol/L (101-111)   10/11/18  21:34    


 


Carbon Dioxide  23 mmol/L (21-32)   10/11/18  21:34    


 


Anion Gap  10.0  (6-13)   10/11/18  21:34    


 


BUN  15 mg/dL (6-20)   10/11/18  21:34    


 


Creatinine  1.2 mg/dL (0.6-1.2)   10/11/18  21:34    


 


Estimated GFR (MDRD)  57  (>89)  L  10/11/18  21:34    


 


Glucose  101 mg/dL ()  H  10/11/18  21:34    


 


Lactic Acid  1.6 mmol/L (0.5-2.2)   10/11/18  21:34    


 


Calcium  7.9 mg/dL (8.5-10.3)  L  10/11/18  21:34    


 


Magnesium  1.6 mg/dL (1.7-2.8)  L  10/11/18  21:34    


 


Total Bilirubin  0.8 mg/dL (0.2-1.0)   10/11/18  21:34    


 


AST  16 IU/L (10-42)   10/11/18  21:34    


 


ALT  21 IU/L (10-60)   10/11/18  21:34    


 


Alkaline Phosphatase  66 IU/L ()   10/11/18  21:34    


 


Total Protein  5.1 g/dL (6.7-8.2)  L  10/11/18  21:34    


 


Albumin  2.8 g/dL (3.2-5.5)  L  10/11/18  21:34    


 


Globulin  2.3 g/dL (2.1-4.2)   10/11/18  21:34    


 


Albumin/Globulin Ratio  1.2  (1.0-2.2)   10/11/18  21:34    


 


Lipase  18 U/L (22-51)  L  10/11/18  21:34    


 


Urine Color  YELLOW   10/11/18  20:55    


 


Urine Clarity  CLEAR  (CLEAR)   10/11/18  20:55    


 


Urine pH  6.0 PH (5.0-7.5)   10/11/18  20:55    


 


Ur Specific Gravity  1.025  (1.002-1.030)   10/11/18  20:55    


 


Urine Protein  TRACE mg/dL (NEGATIVE)   10/11/18  20:55    


 


Urine Glucose (UA)  NEGATIVE mg/dL (NEGATIVE)   10/11/18  20:55    


 


Urine Ketones  15 mg/dL (NEGATIVE)  H  10/11/18  20:55    


 


Urine Occult Blood  NEGATIVE  (NEGATIVE)   10/11/18  20:55    


 


Urine Nitrite  NEGATIVE  (NEGATIVE)   10/11/18  20:55    


 


Urine Bilirubin  NEGATIVE  (NEGATIVE)   10/11/18  20:55    


 


Urine Urobilinogen  0.2 (NORMAL) E.U./dL (NORMAL)   10/11/18  20:55    


 


Ur Leukocyte Esterase  TRACE  (NEGATIVE)  H  10/11/18  20:55    


 


Urine RBC  None Seen /HPF (0-5)   10/11/18  20:55    


 


Urine WBC  4-5 /HPF (0-3)   10/11/18  20:55    


 


Ur Squamous Epith Cells  MOD Squamous  (<= Few)  H  10/11/18  20:55    


 


Urine Bacteria  None Seen /HPF (None Seen)   10/11/18  20:55    


 


Ur Microscopic Review  INDICATED   10/11/18  20:55    


 


Urine Culture Comments  NOT INDICATED   10/11/18  20:55    














Core Measures





- Anticipated LOS


I expect patient to be DC'd or transferred within 96 hours.: Yes





- DVT/VTE - Prophylaxis


VTE/DVT Prophylaxis med ordered at admit?: Yes

## 2018-10-12 LAB
ALBUMIN DIAFP-MCNC: 3.2 G/DL (ref 3.2–5.5)
ALBUMIN/GLOB SERPL: 1.3 {RATIO} (ref 1–2.2)
ALP SERPL-CCNC: 75 IU/L (ref 42–121)
ALT SERPL W P-5'-P-CCNC: 23 IU/L (ref 10–60)
ANION GAP SERPL CALCULATED.4IONS-SCNC: 11 MMOL/L (ref 6–13)
AST SERPL W P-5'-P-CCNC: 22 IU/L (ref 10–42)
BASOPHILS # BLD MANUAL: 0 10^3/UL (ref 0–0.1)
BASOPHILS NFR BLD AUTO: 0.3 %
BILIRUB BLD-MCNC: 0.8 MG/DL (ref 0.2–1)
BUN SERPL-MCNC: 14 MG/DL (ref 6–20)
CALCIUM UR-MCNC: 8.1 MG/DL (ref 8.5–10.3)
CHLORIDE SERPL-SCNC: 105 MMOL/L (ref 101–111)
CO2 SERPL-SCNC: 22 MMOL/L (ref 21–32)
CREAT SERPLBLD-SCNC: 1 MG/DL (ref 0.6–1.2)
EOSINOPHIL # BLD MANUAL: 0.1 10^3/UL (ref 0–0.7)
EOSINOPHIL NFR BLD AUTO: 1.3 %
ERYTHROCYTE [DISTWIDTH] IN BLOOD BY AUTOMATED COUNT: 14.8 % (ref 12–15)
GFRSERPLBLD MDRD-ARVRAT: 70 ML/MIN/{1.73_M2} (ref 89–?)
GLOBULIN SER-MCNC: 2.5 G/DL (ref 2.1–4.2)
GLUCOSE SERPL-MCNC: 99 MG/DL (ref 70–100)
HGB UR QL STRIP: 11.1 G/DL (ref 14–18)
LYMPH ABN NFR BLD MANUAL: 0 %
LYMPHOBLASTS # BLD: 19 %
LYMPHOCYTES # BLD MANUAL: 2 10^3/UL (ref 1.5–3.5)
LYMPHOCYTES NFR BLD AUTO: 20.7 %
MAGNESIUM SERPL-MCNC: 1.8 MG/DL (ref 1.7–2.8)
MANUAL DIF COMMENT BLD-IMP: (no result)
MCH RBC QN AUTO: 30.5 PG (ref 27–31)
MCHC RBC AUTO-ENTMCNC: 33.4 G/DL (ref 32–36)
MCV RBC AUTO: 91.2 FL (ref 80–94)
MONOCYTES # BLD MANUAL: 0.4 10^3/UL (ref 0–1)
MONOCYTES NFR BLD AUTO: 10.2 %
NEUTROPHILS # SNV AUTO: 8.8 X10^3/UL (ref 4.8–10.8)
NEUTROPHILS NFR BLD AUTO: 67.5 %
NEUTROPHILS NFR BLD MANUAL: 6.3 10^3/UL (ref 1.5–6.6)
NEUTS BAND NFR BLD MANUAL: 72 %
NEUTS BAND NFR BLD: 0 %
PDW BLD AUTO: 8.5 FL (ref 7.4–11.4)
PHOSPHATE BLD-MCNC: 2.1 MG/DL (ref 2.5–4.6)
PLATELET # BLD: 237 10^3/UL (ref 130–450)
PLATELET BLD QL SMEAR: (no result)
PROT SPEC-MCNC: 5.7 G/DL (ref 6.7–8.2)
RBC MAR: 3.64 10^6/UL (ref 4.7–6.1)
RBC MORPH BLD: (no result)
SODIUM SERPLBLD-SCNC: 138 MMOL/L (ref 135–145)

## 2018-10-12 RX ADMIN — LATANOPROST SCH DROPS: 50 SOLUTION OPHTHALMIC at 21:20

## 2018-10-12 RX ADMIN — SODIUM CHLORIDE, PRESERVATIVE FREE SCH: 5 INJECTION INTRAVENOUS at 08:28

## 2018-10-12 RX ADMIN — POTASSIUM CHLORIDE SCH MLS/HR: 7.46 INJECTION, SOLUTION INTRAVENOUS at 01:57

## 2018-10-12 RX ADMIN — POTASSIUM CHLORIDE SCH MLS/HR: 7.46 INJECTION, SOLUTION INTRAVENOUS at 06:34

## 2018-10-12 RX ADMIN — ASPIRIN SCH MG: 81 TABLET, CHEWABLE ORAL at 08:26

## 2018-10-12 RX ADMIN — SODIUM CHLORIDE AND POTASSIUM CHLORIDE SCH MLS/HR: 9; 1.49 INJECTION, SOLUTION INTRAVENOUS at 22:58

## 2018-10-12 RX ADMIN — CARVEDILOL SCH MG: 3.12 TABLET, FILM COATED ORAL at 08:26

## 2018-10-12 RX ADMIN — POTASSIUM CHLORIDE SCH MLS/HR: 7.46 INJECTION, SOLUTION INTRAVENOUS at 05:13

## 2018-10-12 RX ADMIN — POTASSIUM CHLORIDE SCH MLS/HR: 7.46 INJECTION, SOLUTION INTRAVENOUS at 03:53

## 2018-10-12 RX ADMIN — SODIUM CHLORIDE AND POTASSIUM CHLORIDE SCH MLS/HR: 9; 1.49 INJECTION, SOLUTION INTRAVENOUS at 12:18

## 2018-10-12 RX ADMIN — ENOXAPARIN SODIUM SCH MG: 100 INJECTION SUBCUTANEOUS at 08:26

## 2018-10-12 RX ADMIN — SODIUM PHOSPHATE, DIBASIC, ANHYDROUS, POTASSIUM PHOSPHATE, MONOBASIC, AND SODIUM PHOSPHATE, MONOBASIC, MONOHYDRATE SCH MG: 852; 155; 130 TABLET, COATED ORAL at 17:48

## 2018-10-12 RX ADMIN — CARVEDILOL SCH MG: 3.12 TABLET, FILM COATED ORAL at 21:14

## 2018-10-12 RX ADMIN — LEVOTHYROXINE SODIUM SCH MCG: 0.1 TABLET ORAL at 06:34

## 2018-10-12 RX ADMIN — TIMOLOL MALEATE SCH DROPS: 5 SOLUTION OPHTHALMIC at 12:35

## 2018-10-12 RX ADMIN — FAMOTIDINE SCH MG: 20 TABLET, FILM COATED ORAL at 21:16

## 2018-10-12 RX ADMIN — POLYETHYLENE GLYCOL 3350 SCH GM: 17 POWDER, FOR SOLUTION ORAL at 08:24

## 2018-10-12 RX ADMIN — FAMOTIDINE SCH MG: 20 TABLET, FILM COATED ORAL at 08:26

## 2018-10-12 RX ADMIN — SODIUM CHLORIDE AND POTASSIUM CHLORIDE SCH MLS/HR: 9; 1.49 INJECTION, SOLUTION INTRAVENOUS at 00:49

## 2018-10-12 RX ADMIN — SODIUM PHOSPHATE, DIBASIC, ANHYDROUS, POTASSIUM PHOSPHATE, MONOBASIC, AND SODIUM PHOSPHATE, MONOBASIC, MONOHYDRATE SCH MG: 852; 155; 130 TABLET, COATED ORAL at 08:26

## 2018-10-12 RX ADMIN — FLUTICASONE PROPIONATE SCH: 50 SPRAY, METERED NASAL at 10:28

## 2018-10-12 RX ADMIN — LISINOPRIL SCH MG: 5 TABLET ORAL at 08:25

## 2018-10-12 RX ADMIN — SODIUM PHOSPHATE, DIBASIC, ANHYDROUS, POTASSIUM PHOSPHATE, MONOBASIC, AND SODIUM PHOSPHATE, MONOBASIC, MONOHYDRATE SCH MG: 852; 155; 130 TABLET, COATED ORAL at 12:35

## 2018-10-12 RX ADMIN — Medication SCH MG: at 00:50

## 2018-10-12 RX ADMIN — Medication SCH MG: at 08:33

## 2018-10-12 RX ADMIN — POTASSIUM CHLORIDE SCH MLS/HR: 7.46 INJECTION, SOLUTION INTRAVENOUS at 02:55

## 2018-10-12 RX ADMIN — DOXAZOSIN SCH MG: 4 TABLET ORAL at 21:15

## 2018-10-12 RX ADMIN — TAMSULOSIN HYDROCHLORIDE SCH MG: 0.4 CAPSULE ORAL at 08:25

## 2018-10-12 RX ADMIN — ATORVASTATIN CALCIUM SCH MG: 40 TABLET, FILM COATED ORAL at 21:14

## 2018-10-12 RX ADMIN — SODIUM CHLORIDE, PRESERVATIVE FREE SCH ML: 5 INJECTION INTRAVENOUS at 00:49

## 2018-10-12 RX ADMIN — POTASSIUM CHLORIDE SCH MLS/HR: 7.46 INJECTION, SOLUTION INTRAVENOUS at 00:49

## 2018-10-12 RX ADMIN — SODIUM CHLORIDE, PRESERVATIVE FREE SCH: 5 INJECTION INTRAVENOUS at 17:39

## 2018-10-12 RX ADMIN — POTASSIUM CHLORIDE SCH MEQ: 1500 TABLET, EXTENDED RELEASE ORAL at 08:26

## 2018-10-12 NOTE — DISCHARGE SUMMARY
Discharge Summary


Discharge Date: 10/12/18


Discharging Provider: JOSE LUIS Lowery


Primary Care Provider: Cesario Jay


Code Status: Do Not Attempt Resuscitation


Condition at Discharge: Fair


Discharge Disposition: 01 Home, Self Care





- ALLERGIES


Allergies/Adverse Reactions: 


                                    Allergies











Allergy/AdvReac Type Severity Reaction Status Date / Time


 


No Known Drug Allergies Allergy   Verified 10/11/18 19:46














- MEDICATIONS


Home Medications: 


                                Ambulatory Orders











 Medication  Instructions  Recorded  Confirmed


 


Aspirin 81 mg PO DAILY 07/16/17 10/12/18


 


Tamsulosin HCl 0.4 mg PO DAILY 07/16/17 10/12/18


 


Zolpidem Tartrate 10 mg PO QPM PRN 07/16/17 10/12/18


 


Latanoprost 0.005% Ophth Drops 1 drops EACHEYE QPM 02/17/18 10/12/18





[Xalatan Ophth Drops]   


 


Timolol 0.5% Ophth Drops [Timoptic 1 drops EACHEYE DAILY 02/17/18 10/12/18





0.5% Ophth Drops]   


 


Doxazosin [Cardura] 4 mg PO QPM 06/25/18 10/12/18


 


Levothyroxine Sodium [Synthroid] 100 mcg PO QDAC 07/07/18 10/12/18


 


Pilocarpine HCl 5 mg PO TID 07/07/18 10/12/18


 


raNITIdine [Zantac] 150 mg PO DAILY 07/07/18 10/12/18


 


Atorvastatin Calcium 40 mg PO QPM 09/22/18 10/12/18


 


Carvedilol 3.125 mg PO BID 09/22/18 10/02/18


 


Fluticasone [Flonase] 2 sprays TRAE DAILY 09/22/18 10/12/18


 


Furosemide 20 mg PO DAILY 09/22/18 10/02/18


 


Lisinopril 5 mg PO DAILY 09/22/18 10/02/18


 


Ferrous Sulfate 325 mg PO DAILY #30 tablet 09/23/18 10/02/18


 


Ipratropium [Atrovent] 2 puffs IH BID 10/02/18 10/12/18


 


Clopidogrel Bisulfate [Clopidogrel] 75 mg PO DAILY 10/12/18 














- PHYSICAL EXAM AT DISCHARGE


General Appearance: positive: No acute distress, Alert


Eyes Bilateral: positive: PERRL


ENT: positive: Pharynx nml, No signs of dehydration


Neck: positive: Thyroid nml, No JVD, Trachea midline


Respiratory: positive: Chest non-tender, No respiratory distress, Breath sounds 

nml


Cardiovascular: positive: Regular rate & rhythm, No gallop, Systolic murmur


Peripheral Pulses: positive: 2+


Abdomen: positive: Non-tender, Nml bowel sounds


Back: positive: Nml inspection


Skin: positive: No rash, Warm, Dry


Extremities: positive: Non-tender, Full ROM, Nml appearance, No pedal edema


Neurologic/Psychiatric: positive: CN's nml (2-12), Disoriented to time, 

Weakness, Sensory loss, Depressed mood/affect


Reflexes: Bicep (R): 2+, Bicep (L): 2+





- LABS


Result Diagrams: 


                                 10/12/18 05:53





                                 10/12/18 05:53





- TIME SPENT


Time Spent in Discharge (Minutes): 55

## 2018-10-12 NOTE — DISCHARGE PLAN
Discharge Plan


Condition: Fair


Diet: Regular


Activity Restrictions: Activity as Tolerated


Shower Restrictions: No


Driving Restrictions: Yes


Assistance Devices: Walker


Weight Bearing: Full Weight


Additional Instructions or Follow Up instructions: 


You were admitted for weakness and you were not able to get up after a fall.  

Luckily, you were found to have no injuries.  





Also, some of your electrolytes were abnormal, so you were given supplements for

that.  Please take all of your regular medications as usual, there have been no 

changes.





A physical therapist saw you and recommends home health physical therapy, so I 

have ordered this.  Also, you will need a walker, so I have ordered this.





Please see your PCP within one week.


Follow-Up Care: Home Health - PT


No Smoking: If you smoke, Please STOP!  Call [phone] for help.


Follow-up with: 


Cesario Jay DO [Primary Care Provider] -

## 2018-10-12 NOTE — PROVIDER PROGRESS NOTE
Subjective





- Prog Note Date


Prog Note Date: 10/12/18


Prog Note Time: 15:00





- Subjective


Pt reports feeling: No change


Subjective: 


Raisa complains of profound weakness today with ongoing abdominal pain.  He denies

chest pain, rashes, a new cough or shortness of breath at rest.  He states that 

eating has been difficult for him.  








Current Medications





- Current Medications


Current Medications: 





Active Medications





Acetaminophen (Tylenol)  650 mg PO Q4HR PRN


   PRN Reason: Pain 1 to 4


Aspirin ([place] Aspirin)  81 mg PO DAILY [facility]


   Last Admin: 10/12/18 08:26 Dose:  81 mg


Atorvastatin Calcium (Lipitor)  40 mg PO QPM [facility]


Carvedilol (Coreg)  3.125 mg PO BID [facility]


   Last Admin: 10/12/18 08:26 Dose:  3.125 mg


Doxazosin Mesylate (Cardura)  4 mg PO QPM [facility]


Enoxaparin Sodium (Lovenox)  40 mg SUBQ DAILY [facility]


   Last Admin: 10/12/18 08:26 Dose:  40 mg


Famotidine (Pepcid)  20 mg PO BID [facility]


   Last Admin: 10/12/18 08:26 Dose:  20 mg


Famotidine (Pepcid)  20 mg PO DAILY [facility]


   Last Admin: 10/12/18 08:27 Dose:  Not Given


Ferrous Sulfate (Feosol)  325 mg PO DAILY [facility]


   Last Admin: 10/12/18 08:25 Dose:  325 mg


Fluticasone Propionate (Flonase)  0 sprays TRAE DAILY [facility]


   Last Admin: 10/12/18 10:28 Dose:  Not Given


Potassium Chloride/Sodium Chloride (Normal Saline 0.9% W/20 Meq Kcl)  1,000 mls 

@ 100 mls/hr IV .Q10H [facility]


   Last Admin: 10/12/18 12:18 Dose:  100 mls/hr


Latanoprost (Xalatan Ophth Drops)  1 drops EACHEYE QPM [facility]


Levothyroxine Sodium (Synthroid)  100 mcg PO QDAC [facility]


   Last Admin: 10/12/18 06:34 Dose:  100 mcg


Lisinopril (Zestril)  5 mg PO DAILY [facility]


   Last Admin: 10/12/18 08:25 Dose:  5 mg


Magnesium Oxide (Mag Ox)  400 mg PO DAILYWM [facility]


   Last Admin: 10/12/18 08:33 Dose:  400 mg


Ondansetron HCl (Zofran Inj)  4 mg IVP Q6HR PRN


   PRN Reason: Nausea / Vomiting


(Pilocarpine Hcl [ Pilocarpine Hcl] 5 Mg) Tab  1 each PO TID [facility]


   Last Admin: 10/12/18 12:36 Dose:  Not Given


Polyethylene Glycol (Miralax)  17 gm PO DAILY [facility]


   Last Admin: 10/12/18 08:24 Dose:  17 gm


Potassium Chloride (K-Dur)  20 meq PO DAILYWM [facility]


   Last Admin: 10/12/18 08:26 Dose:  20 meq


Prochlorperazine Edisylate (Compazine Inj)  10 mg IVP Q6HR PRN


   PRN Reason: Nausea / Vomiting


Promethazine HCl (Phenergan Inj)  25 mg IM Q6HR PRN


   PRN Reason: Nausea / Vomiting


Sodium Chloride (Normal Saline Flush 0.9%)  10 ml IVP PRN PRN


   PRN Reason: AS NEEDED PER PROVIDER ORDERS


Sodium Chloride (Normal Saline Flush 0.9%)  10 ml IVP 0100,0900,1700 [facility]


   Last Admin: 10/12/18 17:39 Dose:  Not Given


Sodium Phosphate (K-Phos Neutral)  250 mg PO TIDWM [facility]


   Last Admin: 10/12/18 17:48 Dose:  250 mg


Tamsulosin HCl (Flomax)  0.4 mg PO DAILY [facility]


   Last Admin: 10/12/18 08:25 Dose:  0.4 mg


Timolol Maleate (Timoptic 0.5% Ophth Drops)  1 drops EACHEYE DAILY [facility]


   Last Admin: 10/12/18 12:35 Dose:  1 drops


Zolpidem Tartrate (Ambien)  10 mg PO QPM PRN


   PRN Reason: Insomnia





                                        





Aspirin 81 mg PO DAILY 07/16/17 


Tamsulosin HCl 0.4 mg PO DAILY 07/16/17 


Zolpidem Tartrate 10 mg PO QPM PRN 07/16/17 


Latanoprost 0.005% Ophth Drops [Xalatan Ophth Drops] 1 drops EACHEYE QPM 

02/17/18 


Timolol 0.5% Ophth Drops [Timoptic 0.5% Ophth Drops] 1 drops EACHEYE DAILY 

02/17/18 


Doxazosin [Cardura] 4 mg PO QPM 06/25/18 


Levothyroxine Sodium [Synthroid] 100 mcg PO QDAC 07/07/18 


Pilocarpine HCl 5 mg PO TID 07/07/18 


raNITIdine [Zantac] 150 mg PO DAILY 07/07/18 


Atorvastatin Calcium 40 mg PO QPM 09/22/18 


Carvedilol 3.125 mg PO BID 09/22/18 


Fluticasone [Flonase] 2 sprays TRAE DAILY 09/22/18 


Furosemide 20 mg PO DAILY 09/22/18 


Lisinopril 5 mg PO DAILY 09/22/18 


Ipratropium [Atrovent] 2 puffs IH BID 10/02/18 


Clopidogrel Bisulfate [Clopidogrel] 75 mg PO DAILY 10/12/18 











Objective





- Vital Signs/Intake & Output


Reviewed Vital Signs: Yes


Vital Signs: 


                                Vital Signs x48h











  Temp Pulse Resp BP Pulse Ox


 


 10/12/18 15:31  36.3 C L  59 L  20  116/59 L  96


 


 10/12/18 13:42  36.3 C L  71  20  103/82 H  95











Intake & Output: 


                                 Intake & Output











 10/09/18 10/10/18 10/11/18 10/12/18





 23:59 23:59 23:59 23:59


 


Intake Total   1100 3333


 


Output Total    900


 


Balance   1100 2433














- Objective


General Appearance: positive: Alert, Mild distress, Anxious


Eyes Bilateral: positive: PERRL, No lid inflammation


Eyes: OU Conjunctivae pale


ENT: positive: Pharynx nml, No signs of dehydration, Other (profound [place])


Neck: positive: Thyroid nml, No JVD, Trachea midline


Respiratory: positive: Chest non-tender, No respiratory distress, Other 

(diminished.)


Cardiovascular: positive: Regular rate & rhythm, Systolic murmur


Peripheral Pulses: 1+ Radial (R), 1+ Radial (L)


Abdomen: positive: Non-tender, Nml bowel sounds


Back: positive: Nml inspection


Skin: positive: No rash, Warm, Dry, Pallor


Extremities: positive: Non-tender, No pedal edema, Joint swelling


Neurologic/Psychiatric: positive: Disoriented to time, Weakness, Sensory loss, 

Slurred/abnml speech, Depressed mood/affect


Reflexes: Bicep (R): 2+, Bicep (L): 2+





- Lab Results


Fish Bones: 


                                 10/14/18 05:15





                                 10/14/18 05:15


Other Labs: 


                               Lab Results x24hrs











  10/12/18 10/12/18 10/12/18 Range/Units





  05:53 05:53 05:53 


 


WBC    8.8  (4.8-10.8)  x10^3/uL


 


RBC    3.64 L  (4.70-6.10)  10^6/uL


 


Hgb    11.1 L  (14.0-18.0)  g/dL


 


Hct    33.2 L  (42.0-52.0)  %


 


MCV    91.2  (80.0-94.0)  fL


 


MCH    30.5  (27.0-31.0)  pg


 


MCHC    33.4  (32.0-36.0)  g/dL


 


RDW    14.8  (12.0-15.0)  %


 


Plt Count    237  (130-450)  10^3/uL


 


MPV    8.5  (7.4-11.4)  fL


 


Neut # (Auto)    Not Reportable  (1.5-6.6)  10^3/uL


 


Lymph # (Auto)    Not Reportable  (1.5-3.5)  10^3/uL


 


Mono # (Auto)    Not Reportable  (0.0-1.0)  10^3/uL


 


Eos # (Auto)    Not Reportable  (0.0-0.7)  10^3/uL


 


Baso # (Auto)    Not Reportable  (0.0-0.1)  10^3/uL


 


Absolute Nucleated RBC    Not Reportable  x10^3/uL


 


Total Counted    100  


 


Band Neuts % (Manual)    0 (0 - 10) %


 


Reactive Lymphs % (Man)    4  %


 


Abnorm Lymph % (Manual)    0  %


 


Nucleated RBC %    Not Reportable  /100WBC


 


Neutrophils # (Manual)    6.3  (1.5-6.6)  10^3/uL


 


Lymphocytes # (Manual)    2.0  (1.5-3.5)  10^3/uL


 


Monocytes # (Manual)    0.4  (0.0-1.0)  10^3/uL


 


Eosinophils # (Manual)    0.1  (0-0.7)  10^3/uL


 


Basophils # (Manual)    0.0  (0-0.1)  10^3/uL


 


Differential Comment    MANUAL DIFFERENTIAL  


 


Platelet Estimate    NORMAL (130-450,000)  (NORMAL)  


 


RBC Morph Micro Appear    NORMAL APPEARANCE  (NORMAL)  


 


Sodium   138   (135-145)  mmol/L


 


Potassium   3.8   (3.5-5.0)  mmol/L


 


Chloride   105   (101-111)  mmol/L


 


Carbon Dioxide   22   (21-32)  mmol/L


 


Anion Gap   11.0   (6-13)  


 


BUN   14   (6-20)  mg/dL


 


Creatinine   1.0   (0.6-1.2)  mg/dL


 


Estimated GFR (MDRD)   70 L   (>89)  


 


Glucose   99   ()  mg/dL


 


Lactic Acid  1.9    (0.5-2.2)  mmol/L


 


Calcium   8.1 L   (8.5-10.3)  mg/dL


 


Phosphorus   2.1 L   (2.5-4.6)  mg/dL


 


Magnesium   1.8   (1.7-2.8)  mg/dL


 


Total Bilirubin   0.8   (0.2-1.0)  mg/dL


 


AST   22   (10-42)  IU/L


 


ALT   23   (10-60)  IU/L


 


Alkaline Phosphatase   75   ()  IU/L


 


Total Protein   5.7 L   (6.7-8.2)  g/dL


 


Albumin   3.2   (3.2-5.5)  g/dL


 


Globulin   2.5   (2.1-4.2)  g/dL


 


Albumin/Globulin Ratio   1.3   (1.0-2.2)  


 


Lipase     (22-51)  U/L


 


Urine Color     


 


Urine Clarity     (CLEAR)  


 


Urine pH     (5.0-7.5)  PH


 


Ur Specific Gravity     (1.002-1.030)  


 


Urine Protein     (NEGATIVE)  mg/dL


 


Urine Glucose (UA)     (NEGATIVE)  mg/dL


 


Urine Ketones     (NEGATIVE)  mg/dL


 


Urine Occult Blood     (NEGATIVE)  


 


Urine Nitrite     (NEGATIVE)  


 


Urine Bilirubin     (NEGATIVE)  


 


Urine Urobilinogen     (NORMAL)  E.U./dL


 


Ur Leukocyte Esterase     (NEGATIVE)  


 


Urine RBC     (0-5)  /HPF


 


Urine WBC     (0-3)  /HPF


 


Ur Squamous Epith Cells     (<= Few)  


 


Urine Bacteria     (None Seen)  /HPF


 


Ur Microscopic Review     


 


Urine Culture Comments     














  10/11/18 10/11/18 10/11/18 Range/Units





  21:34 21:34 21:34 


 


WBC    6.7  (4.8-10.8)  x10^3/uL


 


RBC    2.87 L  (4.70-6.10)  10^6/uL


 


Hgb    9.0 L  (14.0-18.0)  g/dL


 


Hct    26.6 L  (42.0-52.0)  %


 


MCV    92.8  (80.0-94.0)  fL


 


MCH    31.2 H  (27.0-31.0)  pg


 


MCHC    33.7  (32.0-36.0)  g/dL


 


RDW    15.1 H  (12.0-15.0)  %


 


Plt Count    204  (130-450)  10^3/uL


 


MPV    7.9  (7.4-11.4)  fL


 


Neut # (Auto)    5.1  (1.5-6.6)  10^3/uL


 


Lymph # (Auto)    0.8 L  (1.5-3.5)  10^3/uL


 


Mono # (Auto)    0.7  (0.0-1.0)  10^3/uL


 


Eos # (Auto)    0.0  (0.0-0.7)  10^3/uL


 


Baso # (Auto)    0.1  (0.0-0.1)  10^3/uL


 


Absolute Nucleated RBC    0.00  x10^3/uL


 


Total Counted     


 


Band Neuts % (Manual)    (0 - 10) %


 


Reactive Lymphs % (Man)     %


 


Abnorm Lymph % (Manual)     %


 


Nucleated RBC %    0.0  /100WBC


 


Neutrophils # (Manual)     (1.5-6.6)  10^3/uL


 


Lymphocytes # (Manual)     (1.5-3.5)  10^3/uL


 


Monocytes # (Manual)     (0.0-1.0)  10^3/uL


 


Eosinophils # (Manual)     (0-0.7)  10^3/uL


 


Basophils # (Manual)     (0-0.1)  10^3/uL


 


Differential Comment     


 


Platelet Estimate     (NORMAL)  


 


RBC Morph Micro Appear     (NORMAL)  


 


Sodium   140   (135-145)  mmol/L


 


Potassium   2.3 L*   (3.5-5.0)  mmol/L


 


Chloride   107   (101-111)  mmol/L


 


Carbon Dioxide   23   (21-32)  mmol/L


 


Anion Gap   10.0   (6-13)  


 


BUN   15   (6-20)  mg/dL


 


Creatinine   1.2   (0.6-1.2)  mg/dL


 


Estimated GFR (MDRD)   57 L   (>89)  


 


Glucose   101 H   ()  mg/dL


 


Lactic Acid  1.6    (0.5-2.2)  mmol/L


 


Calcium   7.9 L   (8.5-10.3)  mg/dL


 


Phosphorus     (2.5-4.6)  mg/dL


 


Magnesium   1.6 L   (1.7-2.8)  mg/dL


 


Total Bilirubin   0.8   (0.2-1.0)  mg/dL


 


AST   16   (10-42)  IU/L


 


ALT   21   (10-60)  IU/L


 


Alkaline Phosphatase   66   ()  IU/L


 


Total Protein   5.1 L   (6.7-8.2)  g/dL


 


Albumin   2.8 L   (3.2-5.5)  g/dL


 


Globulin   2.3   (2.1-4.2)  g/dL


 


Albumin/Globulin Ratio   1.2   (1.0-2.2)  


 


Lipase   18 L   (22-51)  U/L


 


Urine Color     


 


Urine Clarity     (CLEAR)  


 


Urine pH     (5.0-7.5)  PH


 


Ur Specific Gravity     (1.002-1.030)  


 


Urine Protein     (NEGATIVE)  mg/dL


 


Urine Glucose (UA)     (NEGATIVE)  mg/dL


 


Urine Ketones     (NEGATIVE)  mg/dL


 


Urine Occult Blood     (NEGATIVE)  


 


Urine Nitrite     (NEGATIVE)  


 


Urine Bilirubin     (NEGATIVE)  


 


Urine Urobilinogen     (NORMAL)  E.U./dL


 


Ur Leukocyte Esterase     (NEGATIVE)  


 


Urine RBC     (0-5)  /HPF


 


Urine WBC     (0-3)  /HPF


 


Ur Squamous Epith Cells     (<= Few)  


 


Urine Bacteria     (None Seen)  /HPF


 


Ur Microscopic Review     


 


Urine Culture Comments     














  10/11/18 Range/Units





  20:55 


 


WBC   (4.8-10.8)  x10^3/uL


 


RBC   (4.70-6.10)  10^6/uL


 


Hgb   (14.0-18.0)  g/dL


 


Hct   (42.0-52.0)  %


 


MCV   (80.0-94.0)  fL


 


MCH   (27.0-31.0)  pg


 


MCHC   (32.0-36.0)  g/dL


 


RDW   (12.0-15.0)  %


 


Plt Count   (130-450)  10^3/uL


 


MPV   (7.4-11.4)  fL


 


Neut # (Auto)   (1.5-6.6)  10^3/uL


 


Lymph # (Auto)   (1.5-3.5)  10^3/uL


 


Mono # (Auto)   (0.0-1.0)  10^3/uL


 


Eos # (Auto)   (0.0-0.7)  10^3/uL


 


Baso # (Auto)   (0.0-0.1)  10^3/uL


 


Absolute Nucleated RBC   x10^3/uL


 


Total Counted   


 


Band Neuts % (Manual)  (0 - 10) %


 


Reactive Lymphs % (Man)   %


 


Abnorm Lymph % (Manual)   %


 


Nucleated RBC %   /100WBC


 


Neutrophils # (Manual)   (1.5-6.6)  10^3/uL


 


Lymphocytes # (Manual)   (1.5-3.5)  10^3/uL


 


Monocytes # (Manual)   (0.0-1.0)  10^3/uL


 


Eosinophils # (Manual)   (0-0.7)  10^3/uL


 


Basophils # (Manual)   (0-0.1)  10^3/uL


 


Differential Comment   


 


Platelet Estimate   (NORMAL)  


 


RBC Morph Micro Appear   (NORMAL)  


 


Sodium   (135-145)  mmol/L


 


Potassium   (3.5-5.0)  mmol/L


 


Chloride   (101-111)  mmol/L


 


Carbon Dioxide   (21-32)  mmol/L


 


Anion Gap   (6-13)  


 


BUN   (6-20)  mg/dL


 


Creatinine   (0.6-1.2)  mg/dL


 


Estimated GFR (MDRD)   (>89)  


 


Glucose   ()  mg/dL


 


Lactic Acid   (0.5-2.2)  mmol/L


 


Calcium   (8.5-10.3)  mg/dL


 


Phosphorus   (2.5-4.6)  mg/dL


 


Magnesium   (1.7-2.8)  mg/dL


 


Total Bilirubin   (0.2-1.0)  mg/dL


 


AST   (10-42)  IU/L


 


ALT   (10-60)  IU/L


 


Alkaline Phosphatase   ()  IU/L


 


Total Protein   (6.7-8.2)  g/dL


 


Albumin   (3.2-5.5)  g/dL


 


Globulin   (2.1-4.2)  g/dL


 


Albumin/Globulin Ratio   (1.0-2.2)  


 


Lipase   (22-51)  U/L


 


Urine Color  YELLOW  


 


Urine Clarity  CLEAR  (CLEAR)  


 


Urine pH  6.0  (5.0-7.5)  PH


 


Ur Specific Gravity  1.025  (1.002-1.030)  


 


Urine Protein  TRACE  (NEGATIVE)  mg/dL


 


Urine Glucose (UA)  NEGATIVE  (NEGATIVE)  mg/dL


 


Urine Ketones  15 H  (NEGATIVE)  mg/dL


 


Urine Occult Blood  NEGATIVE  (NEGATIVE)  


 


Urine Nitrite  NEGATIVE  (NEGATIVE)  


 


Urine Bilirubin  NEGATIVE  (NEGATIVE)  


 


Urine Urobilinogen  0.2 (NORMAL)  (NORMAL)  E.U./dL


 


Ur Leukocyte Esterase  TRACE H  (NEGATIVE)  


 


Urine RBC  None Seen  (0-5)  /HPF


 


Urine WBC  4-5  (0-3)  /HPF


 


Ur Squamous Epith Cells  MOD Squamous H  (<= Few)  


 


Urine Bacteria  None Seen  (None Seen)  /HPF


 


Ur Microscopic Review  INDICATED  


 


Urine Culture Comments  NOT INDICATED  














ABX Reporting


Has patient been on IV antibiotics over the past 48 hours?: No





Assessment/Plan





- Problem List


(1) Generalized weakness


Impression: 


Patient presented with generalized weakness.  Patient's generalized weakness 

appears to be secondary to dehydration from poor oral intake and continued 

diuretics.  He was also extremely hypokalemic which could have caused him to be 

weak.  Patient has received IV fluids along with electrolyte replacement and we 

are awaiting a nutrition consult.  He is able to ambulate with PT, but after 

this activity, the patient becomes extremely weak and has profound fatigue.    


Plan:  Continue daily PT.








(2) Fall


Impression: 


The patient is reported to have fallen at home without being able to get up on 

his own.  When asking his wife, who apparently was present during this time, she

 has no recollection of any of these events.  She is also very confused.  The 

patient has been seen or admitted more than an acceptable amount over the past 

few months, so this issue will be difficult to resolve and NOT having a safe 

discharge plan will be the most likely barrier to discharge.


Plan:  Continue to monitor and PT consult.


Qualifiers: 


   Encounter type: initial encounter   Qualified Code(s): W19.XXXA - Unspecified

 fall, initial encounter   





(3) Confusion


Impression: 


The patient cannot state his medical conditions, and does not know common 

situational facts.  He also has moderate to severe hearing impairment, so this 

may be contributing.  He has electrolyte abnormalities, that may also be causing

 worsening of his mental status.


Plan:  Continue to monitor, fall precautions.








(4) Hypokalemia


Impression: 


Patient presented with generalized weakness and inability to get up off of the 

floor after a fall.  Patient has had poor appetite and poor oral intake over the

 last week.  He has not had any vomiting or diarrhea.  He does admit to some 

nausea.  The symptoms seem to come on after he was started on nitrofurantoin for

 urinary tract infection.  This could be partially side effects from the 

nitrofurantoin.  The patient also has been on Lasix for his congestive heart 

failure which likely also was contributing to hypokalemia.  The patient was 

given replacement potassium both IV and PO, he was initially monitored on 

telemetry, but discontinued after his echo.  His previously scheduled 

nitrofurantoin and Lasix continue to be on hold and he is getting antiemetics 

for nausea, and IV fluids.


Plan:  Continue to monitor potassium by checking daily labs.








(5) Protein-calorie malnutrition, moderate


Impression: 


The patient underwent a nutritional consult and is noted to have lost at least 

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 

dementia.


Plan:  High calorie foods, supplemental protein shakes and up to the chair for 

meals with staff encouragement of PO intake.








(6) Abdominal pain


Impression: 


On exam, the patient claims that his abdominal pain is not new and it proves to 

be his #1 complaint.  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 

chronic ischemia that becomes worse when the patient is dehydrated, which may be

 leading to his falls.  He also explains that his abdominal pain restricts him 

from eating regular meals.  He is able to tolerate protein shakes.


Plan:  continue to treat pain and encourage meals.


Qualifiers: 


   Abdominal location: generalized   Qualified Code(s): R10.84 - Generalized 

abdominal pain   





(7) Hypothyroidism


Impression: 


The patient has a history of hypothyroidism and is on Synthroid at home, which 

has been continued while hospitalized.  The patient has had a recent TSH, that 

was normal, so he was continued on the same dose.


Plan:  continue to monitor and give daily med.


Qualifiers: 


   Hypothyroidism type: acquired   Qualified Code(s): E03.9 - Hypothyroidism, 

unspecified   





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


Impression: 


The patient is prescribed daily lasix, lisinopril and a beta blocker at home.  

His lasix is on hold since he is getting IV fluids, but he has been on the beta 

blocker and ACE since admission.  His last known EF was 40%, as per echo done in

 September 2018.


Plan:  Restart lasix based on ease of breathing, labs, and edema.

## 2018-10-13 LAB
ALBUMIN DIAFP-MCNC: 2.6 G/DL (ref 3.2–5.5)
ALBUMIN/GLOB SERPL: 1.2 {RATIO} (ref 1–2.2)
ALP SERPL-CCNC: 63 IU/L (ref 42–121)
ALT SERPL W P-5'-P-CCNC: 19 IU/L (ref 10–60)
ANION GAP SERPL CALCULATED.4IONS-SCNC: 5 MMOL/L (ref 6–13)
AST SERPL W P-5'-P-CCNC: 17 IU/L (ref 10–42)
BASOPHILS NFR BLD AUTO: 0 10^3/UL (ref 0–0.1)
BASOPHILS NFR BLD AUTO: 0.8 %
BILIRUB BLD-MCNC: < 0.2 MG/DL (ref 0.2–1)
BUN SERPL-MCNC: 14 MG/DL (ref 6–20)
CALCIUM UR-MCNC: 7.8 MG/DL (ref 8.5–10.3)
CHLORIDE SERPL-SCNC: 111 MMOL/L (ref 101–111)
CO2 SERPL-SCNC: 23 MMOL/L (ref 21–32)
CREAT SERPLBLD-SCNC: 1.1 MG/DL (ref 0.6–1.2)
EOSINOPHIL # BLD AUTO: 0.1 10^3/UL (ref 0–0.7)
EOSINOPHIL NFR BLD AUTO: 1.4 %
ERYTHROCYTE [DISTWIDTH] IN BLOOD BY AUTOMATED COUNT: 15.4 % (ref 12–15)
GFRSERPLBLD MDRD-ARVRAT: 63 ML/MIN/{1.73_M2} (ref 89–?)
GLOBULIN SER-MCNC: 2.1 G/DL (ref 2.1–4.2)
GLUCOSE SERPL-MCNC: 106 MG/DL (ref 70–100)
HGB UR QL STRIP: 8.7 G/DL (ref 14–18)
LYMPHOCYTES # SPEC AUTO: 0.8 10^3/UL (ref 1.5–3.5)
LYMPHOCYTES NFR BLD AUTO: 14 %
MAGNESIUM SERPL-MCNC: 1.8 MG/DL (ref 1.7–2.8)
MCH RBC QN AUTO: 30.5 PG (ref 27–31)
MCHC RBC AUTO-ENTMCNC: 32.9 G/DL (ref 32–36)
MCV RBC AUTO: 92.7 FL (ref 80–94)
MONOCYTES # BLD AUTO: 0.5 10^3/UL (ref 0–1)
MONOCYTES NFR BLD AUTO: 8.8 %
NEUTROPHILS # BLD AUTO: 4.5 10^3/UL (ref 1.5–6.6)
NEUTROPHILS # SNV AUTO: 6 X10^3/UL (ref 4.8–10.8)
NEUTROPHILS NFR BLD AUTO: 75 %
PDW BLD AUTO: 8.2 FL (ref 7.4–11.4)
PHOSPHATE BLD-MCNC: 2 MG/DL (ref 2.5–4.6)
PLATELET # BLD: 197 10^3/UL (ref 130–450)
PROT SPEC-MCNC: 4.7 G/DL (ref 6.7–8.2)
RBC MAR: 2.87 10^6/UL (ref 4.7–6.1)
SODIUM SERPLBLD-SCNC: 139 MMOL/L (ref 135–145)

## 2018-10-13 RX ADMIN — TAMSULOSIN HYDROCHLORIDE SCH MG: 0.4 CAPSULE ORAL at 08:19

## 2018-10-13 RX ADMIN — POLYETHYLENE GLYCOL 3350 SCH: 17 POWDER, FOR SOLUTION ORAL at 08:24

## 2018-10-13 RX ADMIN — POTASSIUM CHLORIDE SCH MEQ: 1500 TABLET, EXTENDED RELEASE ORAL at 08:19

## 2018-10-13 RX ADMIN — FERROUS SULFATE TAB 325 MG (65 MG ELEMENTAL FE) SCH MG: 325 (65 FE) TAB at 10:05

## 2018-10-13 RX ADMIN — SODIUM CHLORIDE, PRESERVATIVE FREE SCH ML: 5 INJECTION INTRAVENOUS at 16:56

## 2018-10-13 RX ADMIN — ASPIRIN SCH MG: 81 TABLET, CHEWABLE ORAL at 08:19

## 2018-10-13 RX ADMIN — SODIUM CHLORIDE AND POTASSIUM CHLORIDE SCH MLS/HR: 9; 1.49 INJECTION, SOLUTION INTRAVENOUS at 07:04

## 2018-10-13 RX ADMIN — SODIUM CHLORIDE, PRESERVATIVE FREE SCH: 5 INJECTION INTRAVENOUS at 11:32

## 2018-10-13 RX ADMIN — ENOXAPARIN SODIUM SCH MG: 100 INJECTION SUBCUTANEOUS at 08:19

## 2018-10-13 RX ADMIN — SODIUM PHOSPHATE, DIBASIC, ANHYDROUS, POTASSIUM PHOSPHATE, MONOBASIC, AND SODIUM PHOSPHATE, MONOBASIC, MONOHYDRATE SCH MG: 852; 155; 130 TABLET, COATED ORAL at 12:44

## 2018-10-13 RX ADMIN — LATANOPROST SCH DROPS: 50 SOLUTION OPHTHALMIC at 20:47

## 2018-10-13 RX ADMIN — ATORVASTATIN CALCIUM SCH MG: 40 TABLET, FILM COATED ORAL at 20:47

## 2018-10-13 RX ADMIN — FAMOTIDINE SCH MG: 20 TABLET, FILM COATED ORAL at 20:47

## 2018-10-13 RX ADMIN — FLUTICASONE PROPIONATE SCH: 50 SPRAY, METERED NASAL at 08:24

## 2018-10-13 RX ADMIN — FAMOTIDINE SCH MG: 20 TABLET, FILM COATED ORAL at 08:19

## 2018-10-13 RX ADMIN — TIMOLOL MALEATE SCH DROPS: 5 SOLUTION OPHTHALMIC at 08:19

## 2018-10-13 RX ADMIN — SODIUM PHOSPHATE, DIBASIC, ANHYDROUS, POTASSIUM PHOSPHATE, MONOBASIC, AND SODIUM PHOSPHATE, MONOBASIC, MONOHYDRATE SCH MG: 852; 155; 130 TABLET, COATED ORAL at 08:18

## 2018-10-13 RX ADMIN — DOXAZOSIN SCH MG: 4 TABLET ORAL at 20:47

## 2018-10-13 RX ADMIN — SODIUM CHLORIDE, PRESERVATIVE FREE SCH: 5 INJECTION INTRAVENOUS at 06:00

## 2018-10-13 RX ADMIN — Medication SCH MG: at 10:05

## 2018-10-13 RX ADMIN — LEVOTHYROXINE SODIUM SCH MCG: 0.1 TABLET ORAL at 07:01

## 2018-10-13 RX ADMIN — SODIUM PHOSPHATE, DIBASIC, ANHYDROUS, POTASSIUM PHOSPHATE, MONOBASIC, AND SODIUM PHOSPHATE, MONOBASIC, MONOHYDRATE SCH MG: 852; 155; 130 TABLET, COATED ORAL at 16:56

## 2018-10-13 RX ADMIN — LISINOPRIL SCH MG: 5 TABLET ORAL at 08:19

## 2018-10-13 RX ADMIN — CARVEDILOL SCH MG: 3.12 TABLET, FILM COATED ORAL at 20:47

## 2018-10-13 RX ADMIN — CARVEDILOL SCH MG: 3.12 TABLET, FILM COATED ORAL at 08:19

## 2018-10-13 NOTE — PROVIDER PROGRESS NOTE
Subjective





- Prog Note Date


Prog Note Date: 10/13/18


Prog Note Time: 15:23





- Subjective


Pt reports feeling: No change


Subjective: 


Raisa complains of "feeling very weak".  His wife, Jackie is at the bedside.  He 

states that he has been just sleeping and states that he is interested in 

Hospice.  He denies any new symptoms such as chest pain, nausea, vomiting, 

rashes, bleeding, or a new cough.  Placement is a barrier to a safe discharge.





Current Medications





- Current Medications


Current Medications: 





Active Medications





Acetaminophen (Tylenol)  650 mg PO Q4HR PRN


   PRN Reason: Pain 1 to 4


Aspirin (St Yosvany Aspirin)  81 mg PO DAILY [facility]


   Last Admin: 10/14/18 08:52 Dose:  81 mg


Atorvastatin Calcium (Lipitor)  40 mg PO QPM [facility]


   Last Admin: 10/13/18 20:47 Dose:  40 mg


Carvedilol (Coreg)  3.125 mg PO BID [facility]


   Last Admin: 10/14/18 08:52 Dose:  3.125 mg


Doxazosin Mesylate (Cardura)  4 mg PO QPM [facility]


   Last Admin: 10/13/18 20:47 Dose:  4 mg


Enoxaparin Sodium (Lovenox)  40 mg SUBQ DAILY [facility]


   Last Admin: 10/14/18 08:53 Dose:  40 mg


Famotidine (Pepcid)  20 mg PO BID [facility]


   Last Admin: 10/14/18 08:52 Dose:  20 mg


Ferrous Sulfate (Feosol)  325 mg PO 1000 [facility]


   Last Admin: 10/14/18 11:00 Dose:  325 mg


Fluticasone Propionate (Flonase)  0 sprays TARE DAILY [facility]


   Last Admin: 10/14/18 09:12 Dose:  Not Given


Latanoprost (Xalatan Ophth Drops)  1 drops EACHEYE QPM [facility]


   Last Admin: 10/13/18 20:47 Dose:  1 drops


Levothyroxine Sodium (Synthroid)  100 mcg PO QDAC [facility]


   Last Admin: 10/14/18 06:48 Dose:  100 mcg


Lisinopril (Zestril)  2.5 mg PO DAILY [facility]


   Last Admin: 10/14/18 08:52 Dose:  2.5 mg


Magnesium Oxide (Mag Ox)  400 mg PO 1000 [facility]


   Last Admin: 10/14/18 11:00 Dose:  400 mg


Ondansetron HCl (Zofran Inj)  4 mg IVP Q6HR PRN


   PRN Reason: Nausea / Vomiting


(Pilocarpine Hcl [ Pilocarpine Hcl] 5 Mg) Tab  1 each PO TID [facility]


   Last Admin: 10/14/18 12:26 Dose:  Not Given


Polyethylene Glycol (Miralax)  17 gm PO DAILY [facility]


   Last Admin: 10/14/18 08:53 Dose:  17 gm


Potassium Chloride (K-Dur)  20 meq PO DAILYWM [facility]


   Last Admin: 10/14/18 08:51 Dose:  20 meq


Prochlorperazine Edisylate (Compazine Inj)  10 mg IVP Q6HR PRN


   PRN Reason: Nausea / Vomiting


Promethazine HCl (Phenergan Inj)  25 mg IM Q6HR PRN


   PRN Reason: Nausea / Vomiting


Sodium Chloride (Normal Saline Flush 0.9%)  10 ml IVP PRN PRN


   PRN Reason: AS NEEDED PER PROVIDER ORDERS


Sodium Chloride (Normal Saline Flush 0.9%)  10 ml IVP 0100,0900,1700 [facility]


   Last Admin: 10/14/18 08:54 Dose:  10 ml


Sodium Phosphate (K-Phos Neutral)  250 mg PO TIDWM [facility]


   Last Admin: 10/14/18 12:47 Dose:  250 mg


Tamsulosin HCl (Flomax)  0.4 mg PO DAILY [facility]


   Last Admin: 10/14/18 08:53 Dose:  0.4 mg


Timolol Maleate (Timoptic 0.5% Ophth Drops)  1 drops EACHEYE DAILY [facility]


   Last Admin: 10/14/18 08:54 Dose:  1 drops


Zolpidem Tartrate (Ambien)  10 mg PO QPM PRN


   PRN Reason: Insomnia





                                        





Aspirin 81 mg PO DAILY 07/16/17 


Tamsulosin HCl 0.4 mg PO DAILY 07/16/17 


Zolpidem Tartrate 10 mg PO QPM PRN 07/16/17 


Latanoprost 0.005% Ophth Drops [Xalatan Ophth Drops] 1 drops EACHEYE QPM 

02/17/18 


Timolol 0.5% Ophth Drops [Timoptic 0.5% Ophth Drops] 1 drops EACHEYE DAILY 

02/17/18 


Doxazosin [Cardura] 4 mg PO QPM 06/25/18 


Levothyroxine Sodium [Synthroid] 100 mcg PO QDAC 07/07/18 


Pilocarpine HCl 5 mg PO TID 07/07/18 


raNITIdine [Zantac] 150 mg PO DAILY 07/07/18 


Atorvastatin Calcium 40 mg PO QPM 09/22/18 


Carvedilol 3.125 mg PO BID 09/22/18 


Fluticasone [Flonase] 2 sprays TRAE DAILY 09/22/18 


Furosemide 20 mg PO DAILY 09/22/18 


Lisinopril 5 mg PO DAILY 09/22/18 


Ipratropium [Atrovent] 2 puffs IH BID 10/02/18 


Clopidogrel Bisulfate [Clopidogrel] 75 mg PO DAILY 10/12/18 











Objective





- Vital Signs/Intake & Output


Reviewed Vital Signs: Yes


Vital Signs: 


                                Vital Signs x48h











  Temp Pulse Resp BP BP Pulse Ox


 


 10/13/18 11:15   62  20   110/54 L  98


 


 10/13/18 07:45  36.6 C  63  24  110/59 L   97











Intake & Output: 


                                 Intake & Output











 10/10/18 10/11/18 10/12/18 10/13/18





 23:59 23:59 23:59 23:59


 


Intake Total  1100 4633 1510


 


Output Total   900 700


 


Balance  1100 3733 810














- Objective


General Appearance: positive: No acute distress, Lethargic


Eyes: OU Conjunctivae pale, OU Scleral icterus


ENT: positive: Pharynx nml, Dry mucous membranes


Neck: positive: Thyroid nml, No JVD, Trachea midline


Respiratory: positive: Chest non-tender, No respiratory distress, Other 

(scattered crackles to bilateral low lobes)


Cardiovascular: positive: Regular rate & rhythm, No gallop, Systolic murmur


Peripheral Pulses: 1+ Radial (R), 1+ Radial (L)


Abdomen: positive: Nml bowel sounds, Tenderness, Guarding


Back: positive: Nml inspection


Skin: positive: No rash, Warm, Dry, Pallor


Extremities: positive: Non-tender, No pedal edema


Neurologic/Psychiatric: positive: Disoriented to time, Weakness, Sensory loss, 

Slurred/abnml speech (sluggish speech), Depressed mood/affect


Reflexes: Bicep (R): 3+, Bicep (L): 3+





- Lab Results


Fish Bones: 


                                 10/14/18 05:15





                                 10/14/18 05:15


Other Labs: 


                               Lab Results x24hrs











  10/13/18 10/13/18 Range/Units





  04:35 04:35 


 


WBC   6.0  (4.8-10.8)  x10^3/uL


 


RBC   2.87 L  (4.70-6.10)  10^6/uL


 


Hgb   8.7 L  (14.0-18.0)  g/dL


 


Hct   26.6 L  (42.0-52.0)  %


 


MCV   92.7  (80.0-94.0)  fL


 


MCH   30.5  (27.0-31.0)  pg


 


MCHC   32.9  (32.0-36.0)  g/dL


 


RDW   15.4 H  (12.0-15.0)  %


 


Plt Count   197  (130-450)  10^3/uL


 


MPV   8.2  (7.4-11.4)  fL


 


Neut # (Auto)   4.5  (1.5-6.6)  10^3/uL


 


Lymph # (Auto)   0.8 L  (1.5-3.5)  10^3/uL


 


Mono # (Auto)   0.5  (0.0-1.0)  10^3/uL


 


Eos # (Auto)   0.1  (0.0-0.7)  10^3/uL


 


Baso # (Auto)   0.0  (0.0-0.1)  10^3/uL


 


Absolute Nucleated RBC   0.00  x10^3/uL


 


Nucleated RBC %   0.0  /100WBC


 


Sodium  139   (135-145)  mmol/L


 


Potassium  4.5   (3.5-5.0)  mmol/L


 


Chloride  111   (101-111)  mmol/L


 


Carbon Dioxide  23   (21-32)  mmol/L


 


Anion Gap  5.0 L   (6-13)  


 


BUN  14   (6-20)  mg/dL


 


Creatinine  1.1   (0.6-1.2)  mg/dL


 


Estimated GFR (MDRD)  63 L   (>89)  


 


Glucose  106 H   ()  mg/dL


 


Calcium  7.8 L   (8.5-10.3)  mg/dL


 


Phosphorus  2.0 L   (2.5-4.6)  mg/dL


 


Magnesium  1.8   (1.7-2.8)  mg/dL


 


Total Bilirubin  < 0.2 L   (0.2-1.0)  mg/dL


 


AST  17   (10-42)  IU/L


 


ALT  19   (10-60)  IU/L


 


Alkaline Phosphatase  63   ()  IU/L


 


Total Protein  4.7 L   (6.7-8.2)  g/dL


 


Albumin  2.6 L   (3.2-5.5)  g/dL


 


Globulin  2.1   (2.1-4.2)  g/dL


 


Albumin/Globulin Ratio  1.2   (1.0-2.2)  














ABX Reporting


Has patient been on IV antibiotics over the past 48 hours?: No





Assessment/Plan





- Problem List


(1) Generalized weakness


Impression: 


Patient presented with generalized weakness and appears to be secondary to 

dehydration from poor oral intake, electrolyte abnormalities, and continued 

diuretics.  His IV fluids were stopped late yesterday as he was having more 

hypoxia.  He has been able to ambulate with PT, but after this activity, the 

patient becomes extremely weak and has profound fatigue.  As per physical 

therapy reports, the patient can physically demonstrate up to 150 feet of 

ambulation, but becomes profoundly weak and appears hypoxic.   


Plan:  Continue daily PT and encourage to the chair for meals.








(2) Fall


Impression: 


The patient is reported to have fallen at home without being able to get up on 

his own.  When asking his wife, who apparently was present during this time, she

 has no recollection of any of these events.  She is also very confused.  The 

patient has been seen or admitted more than an acceptable amount over the past 

few months, so this issue will be difficult to resolve and NOT having a safe 

discharge plan will be the most likely barrier to discharge.


Plan:  Continue to monitor and daily PT.


Qualifiers: 


   Encounter type: initial encounter   Qualified Code(s): W19.XXXA - Unspecified

 fall, initial encounter   





(3) Dementia


Impression: 


I spoke to the patient's son, Narendra to inform him of his father's medical 

condition.  Narendra last saw his father in September at which time he would become

 easily irritated and refused many things.  During this hospital stay, the 

patient has been very fatigued and thankful about being here as he has been 

falling at home.  The patient has difficulty with stating the complexity of his 

medical conditions, and wishes not to eat.  He states, "I just want to stay in 

bed".  


Plan:  Palliative care consult after Narendra prays about this and speaks to his 

siblings.








(4) Abdominal pain


Impression: 


On exam, the patient claims that his abdominal pain is not new and it proves to 

be his #1 complaint.  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 

chronic ischemia that becomes worse when the patient is dehydrated, which may be

 leading to his falls.  He also explains that his abdominal pain restricts him 

from eating regular meals.  He is able to tolerate protein shakes.


Plan:  continue to treat pain and encourage meals.


Qualifiers: 


   Abdominal location: generalized   Qualified Code(s): R10.84 - Generalized 

abdominal pain   





(5) Protein-calorie malnutrition, moderate


Impression: 





The patient underwent a nutritional consult and is noted to have lost at least 

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 

dementia.


Plan:  High calorie foods, supplemental protein shakes and up to the chair for 

meals with staff encouragement of PO intake.

## 2018-10-14 LAB
ALBUMIN DIAFP-MCNC: 2.6 G/DL (ref 3.2–5.5)
ALBUMIN/GLOB SERPL: 1.1 {RATIO} (ref 1–2.2)
ALP SERPL-CCNC: 66 IU/L (ref 42–121)
ALT SERPL W P-5'-P-CCNC: 21 IU/L (ref 10–60)
ANION GAP SERPL CALCULATED.4IONS-SCNC: 6 MMOL/L (ref 6–13)
AST SERPL W P-5'-P-CCNC: 20 IU/L (ref 10–42)
BASOPHILS NFR BLD AUTO: 0.1 10^3/UL (ref 0–0.1)
BASOPHILS NFR BLD AUTO: 1 %
BILIRUB BLD-MCNC: 0.4 MG/DL (ref 0.2–1)
BUN SERPL-MCNC: 13 MG/DL (ref 6–20)
CALCIUM UR-MCNC: 8 MG/DL (ref 8.5–10.3)
CHLORIDE SERPL-SCNC: 108 MMOL/L (ref 101–111)
CO2 SERPL-SCNC: 23 MMOL/L (ref 21–32)
CREAT SERPLBLD-SCNC: 1 MG/DL (ref 0.6–1.2)
EOSINOPHIL # BLD AUTO: 0.1 10^3/UL (ref 0–0.7)
EOSINOPHIL NFR BLD AUTO: 1.9 %
ERYTHROCYTE [DISTWIDTH] IN BLOOD BY AUTOMATED COUNT: 15.6 % (ref 12–15)
GFRSERPLBLD MDRD-ARVRAT: 70 ML/MIN/{1.73_M2} (ref 89–?)
GLOBULIN SER-MCNC: 2.3 G/DL (ref 2.1–4.2)
GLUCOSE SERPL-MCNC: 88 MG/DL (ref 70–100)
HGB UR QL STRIP: 9.5 G/DL (ref 14–18)
LYMPHOCYTES # SPEC AUTO: 0.7 10^3/UL (ref 1.5–3.5)
LYMPHOCYTES NFR BLD AUTO: 10.9 %
MCH RBC QN AUTO: 30.8 PG (ref 27–31)
MCHC RBC AUTO-ENTMCNC: 33.6 G/DL (ref 32–36)
MCV RBC AUTO: 91.8 FL (ref 80–94)
MONOCYTES # BLD AUTO: 0.5 10^3/UL (ref 0–1)
MONOCYTES NFR BLD AUTO: 7.7 %
NEUTROPHILS # BLD AUTO: 5.2 10^3/UL (ref 1.5–6.6)
NEUTROPHILS # SNV AUTO: 6.7 X10^3/UL (ref 4.8–10.8)
NEUTROPHILS NFR BLD AUTO: 78.5 %
PDW BLD AUTO: 8.4 FL (ref 7.4–11.4)
PLATELET # BLD: 190 10^3/UL (ref 130–450)
PROT SPEC-MCNC: 4.9 G/DL (ref 6.7–8.2)
RBC MAR: 3.08 10^6/UL (ref 4.7–6.1)
SODIUM SERPLBLD-SCNC: 137 MMOL/L (ref 135–145)

## 2018-10-14 RX ADMIN — SODIUM PHOSPHATE, DIBASIC, ANHYDROUS, POTASSIUM PHOSPHATE, MONOBASIC, AND SODIUM PHOSPHATE, MONOBASIC, MONOHYDRATE SCH MG: 852; 155; 130 TABLET, COATED ORAL at 17:00

## 2018-10-14 RX ADMIN — SODIUM CHLORIDE, PRESERVATIVE FREE SCH ML: 5 INJECTION INTRAVENOUS at 17:00

## 2018-10-14 RX ADMIN — SODIUM PHOSPHATE, DIBASIC, ANHYDROUS, POTASSIUM PHOSPHATE, MONOBASIC, AND SODIUM PHOSPHATE, MONOBASIC, MONOHYDRATE SCH MG: 852; 155; 130 TABLET, COATED ORAL at 08:52

## 2018-10-14 RX ADMIN — FAMOTIDINE SCH MG: 20 TABLET, FILM COATED ORAL at 08:52

## 2018-10-14 RX ADMIN — SODIUM CHLORIDE, PRESERVATIVE FREE SCH ML: 5 INJECTION INTRAVENOUS at 23:58

## 2018-10-14 RX ADMIN — CARVEDILOL SCH MG: 3.12 TABLET, FILM COATED ORAL at 19:56

## 2018-10-14 RX ADMIN — Medication SCH MG: at 11:00

## 2018-10-14 RX ADMIN — FAMOTIDINE SCH MG: 20 TABLET, FILM COATED ORAL at 19:56

## 2018-10-14 RX ADMIN — SODIUM CHLORIDE, PRESERVATIVE FREE SCH ML: 5 INJECTION INTRAVENOUS at 00:37

## 2018-10-14 RX ADMIN — TAMSULOSIN HYDROCHLORIDE SCH MG: 0.4 CAPSULE ORAL at 08:53

## 2018-10-14 RX ADMIN — ENOXAPARIN SODIUM SCH MG: 100 INJECTION SUBCUTANEOUS at 08:53

## 2018-10-14 RX ADMIN — ATORVASTATIN CALCIUM SCH MG: 40 TABLET, FILM COATED ORAL at 19:56

## 2018-10-14 RX ADMIN — DOXAZOSIN SCH MG: 4 TABLET ORAL at 19:56

## 2018-10-14 RX ADMIN — LEVOTHYROXINE SODIUM SCH MCG: 0.1 TABLET ORAL at 06:48

## 2018-10-14 RX ADMIN — ASPIRIN SCH MG: 81 TABLET, CHEWABLE ORAL at 08:52

## 2018-10-14 RX ADMIN — SODIUM CHLORIDE, PRESERVATIVE FREE SCH ML: 5 INJECTION INTRAVENOUS at 08:54

## 2018-10-14 RX ADMIN — FLUTICASONE PROPIONATE SCH: 50 SPRAY, METERED NASAL at 09:12

## 2018-10-14 RX ADMIN — CARVEDILOL SCH MG: 3.12 TABLET, FILM COATED ORAL at 08:52

## 2018-10-14 RX ADMIN — POTASSIUM CHLORIDE SCH MEQ: 1500 TABLET, EXTENDED RELEASE ORAL at 08:51

## 2018-10-14 RX ADMIN — LISINOPRIL SCH MG: 5 TABLET ORAL at 08:52

## 2018-10-14 RX ADMIN — POLYETHYLENE GLYCOL 3350 SCH GM: 17 POWDER, FOR SOLUTION ORAL at 08:53

## 2018-10-14 RX ADMIN — LATANOPROST SCH DROPS: 50 SOLUTION OPHTHALMIC at 19:58

## 2018-10-14 RX ADMIN — TIMOLOL MALEATE SCH DROPS: 5 SOLUTION OPHTHALMIC at 08:54

## 2018-10-14 RX ADMIN — FERROUS SULFATE TAB 325 MG (65 MG ELEMENTAL FE) SCH MG: 325 (65 FE) TAB at 11:00

## 2018-10-14 RX ADMIN — SODIUM PHOSPHATE, DIBASIC, ANHYDROUS, POTASSIUM PHOSPHATE, MONOBASIC, AND SODIUM PHOSPHATE, MONOBASIC, MONOHYDRATE SCH MG: 852; 155; 130 TABLET, COATED ORAL at 12:47

## 2018-10-14 NOTE — PROVIDER PROGRESS NOTE
Subjective





- Prog Note Date


Prog Note Date: 10/14/18


Prog Note Time: 12:00





- Subjective


Pt reports feeling: No change


Subjective: 


Raisa has no complaints today and prefers to remain in bed.  He denies any new 

symptoms.  He is medically stable today but lacks a safe discharge plan at this 

time given the number of times he has been in the ED/admitted.





Current Medications





- Current Medications


Current Medications: 





Active Medications





Acetaminophen (Tylenol)  650 mg PO Q4HR PRN


   PRN Reason: Pain 1 to 4


Aspirin (St Yosvany Aspirin)  81 mg PO DAILY [facility]


   Last Admin: 10/14/18 08:52 Dose:  81 mg


Atorvastatin Calcium (Lipitor)  40 mg PO QPM [facility]


   Last Admin: 10/14/18 19:56 Dose:  40 mg


Carvedilol (Coreg)  3.125 mg PO BID [facility]


   Last Admin: 10/14/18 19:56 Dose:  3.125 mg


Doxazosin Mesylate (Cardura)  4 mg PO QPM [facility]


   Last Admin: 10/14/18 19:56 Dose:  4 mg


Enoxaparin Sodium (Lovenox)  40 mg SUBQ DAILY [facility]


   Last Admin: 10/14/18 08:53 Dose:  40 mg


Famotidine (Pepcid)  20 mg PO BID [facility]


   Last Admin: 10/14/18 19:56 Dose:  20 mg


Ferrous Sulfate (Feosol)  325 mg PO 1000 [facility]


   Last Admin: 10/14/18 11:00 Dose:  325 mg


Fluticasone Propionate (Flonase)  0 sprays TRAE DAILY [facility]


   Last Admin: 10/14/18 09:12 Dose:  Not Given


Latanoprost (Xalatan Ophth Drops)  1 drops EACHEYE QPM [facility]


   Last Admin: 10/14/18 19:58 Dose:  1 drops


Levothyroxine Sodium (Synthroid)  100 mcg PO QDAC [facility]


   Last Admin: 10/14/18 06:48 Dose:  100 mcg


Lisinopril (Zestril)  2.5 mg PO DAILY [facility]


   Last Admin: 10/14/18 08:52 Dose:  2.5 mg


Magnesium Oxide (Mag Ox)  400 mg PO 1000 [facility]


   Last Admin: 10/14/18 11:00 Dose:  400 mg


Ondansetron HCl (Zofran Inj)  4 mg IVP Q6HR PRN


   PRN Reason: Nausea / Vomiting


(Pilocarpine Hcl [ Pilocarpine Hcl] 5 Mg) Tab  1 each PO TID [facility]


   Last Admin: 10/14/18 20:04 Dose:  Not Given


Polyethylene Glycol (Miralax)  17 gm PO DAILY [facility]


   Last Admin: 10/14/18 08:53 Dose:  17 gm


Potassium Chloride (K-Dur)  20 meq PO DAILYWM [facility]


   Last Admin: 10/14/18 08:51 Dose:  20 meq


Prochlorperazine Edisylate (Compazine Inj)  10 mg IVP Q6HR PRN


   PRN Reason: Nausea / Vomiting


Promethazine HCl (Phenergan Inj)  25 mg IM Q6HR PRN


   PRN Reason: Nausea / Vomiting


Sodium Chloride (Normal Saline Flush 0.9%)  10 ml IVP PRN PRN


   PRN Reason: AS NEEDED PER PROVIDER ORDERS


Sodium Chloride (Normal Saline Flush 0.9%)  10 ml IVP 0100,0900,1700 [facility]


   Last Admin: 10/14/18 17:00 Dose:  10 ml


Sodium Phosphate (K-Phos Neutral)  250 mg PO TIDWM [facility]


   Last Admin: 10/14/18 17:00 Dose:  250 mg


Tamsulosin HCl (Flomax)  0.4 mg PO DAILY [facility]


   Last Admin: 10/14/18 08:53 Dose:  0.4 mg


Timolol Maleate (Timoptic 0.5% Ophth Drops)  1 drops EACHEYE DAILY [facility]


   Last Admin: 10/14/18 08:54 Dose:  1 drops


Zolpidem Tartrate (Ambien)  10 mg PO QPM PRN


   PRN Reason: Insomnia





                                        





Aspirin 81 mg PO DAILY 07/16/17 


Tamsulosin HCl 0.4 mg PO DAILY 07/16/17 


Zolpidem Tartrate 10 mg PO QPM PRN 07/16/17 


Latanoprost 0.005% Ophth Drops [Xalatan Ophth Drops] 1 drops EACHEYE QPM 02/ 17/18 


Timolol 0.5% Ophth Drops [Timoptic 0.5% Ophth Drops] 1 drops EACHEYE DAILY 

02/17/18 


Doxazosin [Cardura] 4 mg PO QPM 06/25/18 


Levothyroxine Sodium [Synthroid] 100 mcg PO QDAC 07/07/18 


Pilocarpine HCl 5 mg PO TID 07/07/18 


raNITIdine [Zantac] 150 mg PO DAILY 07/07/18 


Atorvastatin Calcium 40 mg PO QPM 09/22/18 


Carvedilol 3.125 mg PO BID 09/22/18 


Fluticasone [Flonase] 2 sprays TRAE DAILY 09/22/18 


Furosemide 20 mg PO DAILY 09/22/18 


Lisinopril 5 mg PO DAILY 09/22/18 


Ipratropium [Atrovent] 2 puffs IH BID 10/02/18 


Clopidogrel Bisulfate [Clopidogrel] 75 mg PO DAILY 10/12/18 











Objective





- Vital Signs/Intake & Output


Reviewed Vital Signs: Yes


Intake & Output: 


                                 Intake & Output











 10/11/18 10/12/18 10/13/18 10/14/18





 23:59 23:59 23:59 23:59


 


Intake Total 1100 4633 2980 270


 


Output Total  900 1400 600


 


Balance 1100 3733 1580 -330














- Objective


General Appearance: positive: Alert, Mild distress, Lethargic


Eyes: OU Conjunctivae pale, OU Scleral icterus


ENT: positive: No signs of dehydration


Neck: positive: Thyroid nml, No JVD, Trachea midline, Stiff neck


Respiratory: positive: Chest non-tender, No respiratory distress, Other 

(diminished)


Cardiovascular: positive: Regular rate & rhythm, No gallop, Systolic murmur, 

Decreased pulse(s)


Peripheral Pulses: 1+ Radial (R), 1+ Radial (L)


Abdomen: positive: Nml bowel sounds, Tenderness, Guarding


Back: positive: Nml inspection


Skin: positive: No rash, Warm, Dry, Pallor


Extremities: positive: Non-tender, No pedal edema, Joint swelling


Neurologic/Psychiatric: positive: Disoriented to time, Weakness, Sensory loss, 

Slurred/abnml speech, Depressed mood/affect


Reflexes: Bicep (R): 2+, Bicep (L): 2+





- Lab Results


Fish Bones: 


                                 10/14/18 05:15





                                 10/14/18 05:15


Other Labs: 


                               Lab Results x24hrs











  10/14/18 10/14/18 Range/Units





  05:15 05:15 


 


WBC   6.7  (4.8-10.8)  x10^3/uL


 


RBC   3.08 L  (4.70-6.10)  10^6/uL


 


Hgb   9.5 L  (14.0-18.0)  g/dL


 


Hct   28.3 L  (42.0-52.0)  %


 


MCV   91.8  (80.0-94.0)  fL


 


MCH   30.8  (27.0-31.0)  pg


 


MCHC   33.6  (32.0-36.0)  g/dL


 


RDW   15.6 H  (12.0-15.0)  %


 


Plt Count   190  (130-450)  10^3/uL


 


MPV   8.4  (7.4-11.4)  fL


 


Neut # (Auto)   5.2  (1.5-6.6)  10^3/uL


 


Lymph # (Auto)   0.7 L  (1.5-3.5)  10^3/uL


 


Mono # (Auto)   0.5  (0.0-1.0)  10^3/uL


 


Eos # (Auto)   0.1  (0.0-0.7)  10^3/uL


 


Baso # (Auto)   0.1  (0.0-0.1)  10^3/uL


 


Absolute Nucleated RBC   0.00  x10^3/uL


 


Nucleated RBC %   0.1  /100WBC


 


Sodium  137   (135-145)  mmol/L


 


Potassium  4.5   (3.5-5.0)  mmol/L


 


Chloride  108   (101-111)  mmol/L


 


Carbon Dioxide  23   (21-32)  mmol/L


 


Anion Gap  6.0   (6-13)  


 


BUN  13   (6-20)  mg/dL


 


Creatinine  1.0   (0.6-1.2)  mg/dL


 


Estimated GFR (MDRD)  70 L   (>89)  


 


Glucose  88   ()  mg/dL


 


Calcium  8.0 L   (8.5-10.3)  mg/dL


 


Total Bilirubin  0.4   (0.2-1.0)  mg/dL


 


AST  20   (10-42)  IU/L


 


ALT  21   (10-60)  IU/L


 


Alkaline Phosphatase  66   ()  IU/L


 


Total Protein  4.9 L   (6.7-8.2)  g/dL


 


Albumin  2.6 L   (3.2-5.5)  g/dL


 


Globulin  2.3   (2.1-4.2)  g/dL


 


Albumin/Globulin Ratio  1.1   (1.0-2.2)  














ABX Reporting


Has patient been on IV antibiotics over the past 48 hours?: No





Assessment/Plan





- Problem List


(1) Generalized weakness


Impression: 


Patient presented with generalized weakness and appears to be secondary to de

hydration from poor oral intake, electrolyte abnormalities, and continued 

diuretics.  He has been able to ambulate with PT, but after this activity, the 

patient becomes extremely weak and has profound fatigue.  As per physical 

therapy reports, the patient can physically demonstrate up to 150 feet of 

ambulation, but becomes profoundly weak and appears hypoxic.  Today, PT reports 

that the patient refused a therapy session when asked.   


Plan:  Continue daily PT and encourage to the chair for meals.








(2) Fall


Impression: 


The patient is reported to have fallen at home without being able to get up on 

his own.  When asking his wife, who apparently was present during this time, she

 has no recollection of any of these events.  She is also very confused.  The 

patient has been seen or admitted more than an acceptable amount over the past 

few months, so this issue will be difficult to resolve and NOT having a safe 

discharge plan will be the most likely barrier to discharge.  The patient's son,

 Narendra requests more time for prayer and discussion with his siblings despite 

reminders that his father is medically stable, so a timely decision should be 

made.


Plan:  Continue to monitor and daily PT.


Qualifiers: 


   Encounter type: initial encounter   Qualified Code(s): W19.XXXA - Unspecified

 fall, initial encounter   





(3) Dementia


Impression: 


I spoke to the patient's son, Narendra yesterday to inform him of his father's 

medical condition.  Narendra last saw his father in September at which time he 

would become easily irritated and refused many things.  During this hospital 

stay, the patient has been very fatigued and thankful about being here as he has

 been falling at home.  The patient has difficulty with stating the complexity 

of his medical conditions, and wishes not to eat.  He states, "I just sleep most

 of the time". 


Plan:  Palliative care consult after Narendra prays about this and speaks to his 

siblings.








(4) Abdominal pain


Impression: 


The patient states that his stomach pain is about the same each day and 

continues to have a very poor appetite.  The most likely cause of this is his 

known mesenteric chronic ischemia that becomes worse when the patient is 

dehydrated, which may be leading to his falls.  He also explains that his 

abdominal pain restricts him from eating regular meals.  He is able to tolerate 

protein shakes.


Plan:  continue to treat pain and encourage meals.  Nutrition is following.


Qualifiers: 


   Abdominal location: generalized   Qualified Code(s): R10.84 - Generalized 

abdominal pain   





(5) Protein-calorie malnutrition, moderate


Impression: 


The patient underwent a nutritional consult and is noted to have lost at least 

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 

dementia.  As per chart review, he only consumes bites during meals with the hi

ghest amount being 25%.  He is incontinent of urine.


Plan:  High calorie foods, supplemental protein shakes and up to the chair for 

meals with staff encouragement of PO intake.

## 2018-10-15 LAB
BASOPHILS NFR BLD AUTO: 0 10^3/UL (ref 0–0.1)
BASOPHILS NFR BLD AUTO: 0.5 %
EOSINOPHIL # BLD AUTO: 0.2 10^3/UL (ref 0–0.7)
EOSINOPHIL NFR BLD AUTO: 1.7 %
ERYTHROCYTE [DISTWIDTH] IN BLOOD BY AUTOMATED COUNT: 15.1 % (ref 12–15)
HGB UR QL STRIP: 9.9 G/DL (ref 14–18)
LYMPHOCYTES # SPEC AUTO: 0.7 10^3/UL (ref 1.5–3.5)
LYMPHOCYTES NFR BLD AUTO: 7.2 %
MCH RBC QN AUTO: 31 PG (ref 27–31)
MCHC RBC AUTO-ENTMCNC: 33.3 G/DL (ref 32–36)
MCV RBC AUTO: 93 FL (ref 80–94)
MONOCYTES # BLD AUTO: 0.5 10^3/UL (ref 0–1)
MONOCYTES NFR BLD AUTO: 5.8 %
NEUTROPHILS # BLD AUTO: 7.9 10^3/UL (ref 1.5–6.6)
NEUTROPHILS # SNV AUTO: 9.3 X10^3/UL (ref 4.8–10.8)
NEUTROPHILS NFR BLD AUTO: 84.8 %
PDW BLD AUTO: 8.3 FL (ref 7.4–11.4)
PLATELET # BLD: 185 10^3/UL (ref 130–450)
RBC MAR: 3.19 10^6/UL (ref 4.7–6.1)

## 2018-10-15 RX ADMIN — DOXAZOSIN SCH MG: 4 TABLET ORAL at 21:48

## 2018-10-15 RX ADMIN — SODIUM CHLORIDE, PRESERVATIVE FREE SCH ML: 5 INJECTION INTRAVENOUS at 21:47

## 2018-10-15 RX ADMIN — SODIUM CHLORIDE, PRESERVATIVE FREE SCH ML: 5 INJECTION INTRAVENOUS at 23:33

## 2018-10-15 RX ADMIN — TIMOLOL MALEATE SCH DROPS: 5 SOLUTION OPHTHALMIC at 08:34

## 2018-10-15 RX ADMIN — FERROUS SULFATE TAB 325 MG (65 MG ELEMENTAL FE) SCH MG: 325 (65 FE) TAB at 10:35

## 2018-10-15 RX ADMIN — CARVEDILOL SCH MG: 3.12 TABLET, FILM COATED ORAL at 08:24

## 2018-10-15 RX ADMIN — FAMOTIDINE SCH MG: 20 TABLET, FILM COATED ORAL at 08:25

## 2018-10-15 RX ADMIN — TAMSULOSIN HYDROCHLORIDE SCH MG: 0.4 CAPSULE ORAL at 08:25

## 2018-10-15 RX ADMIN — LISINOPRIL SCH MG: 5 TABLET ORAL at 08:25

## 2018-10-15 RX ADMIN — ATORVASTATIN CALCIUM SCH MG: 40 TABLET, FILM COATED ORAL at 21:48

## 2018-10-15 RX ADMIN — ASPIRIN SCH MG: 81 TABLET, CHEWABLE ORAL at 08:24

## 2018-10-15 RX ADMIN — Medication SCH MG: at 10:35

## 2018-10-15 RX ADMIN — SODIUM CHLORIDE, PRESERVATIVE FREE SCH ML: 5 INJECTION INTRAVENOUS at 08:33

## 2018-10-15 RX ADMIN — FLUTICASONE PROPIONATE SCH SPR: 50 SPRAY, METERED NASAL at 08:32

## 2018-10-15 RX ADMIN — LEVOTHYROXINE SODIUM SCH MCG: 0.1 TABLET ORAL at 06:36

## 2018-10-15 RX ADMIN — FAMOTIDINE SCH MG: 20 TABLET, FILM COATED ORAL at 21:48

## 2018-10-15 RX ADMIN — POLYETHYLENE GLYCOL 3350 SCH: 17 POWDER, FOR SOLUTION ORAL at 08:38

## 2018-10-15 RX ADMIN — ENOXAPARIN SODIUM SCH MG: 100 INJECTION SUBCUTANEOUS at 08:25

## 2018-10-15 RX ADMIN — LATANOPROST SCH DROPS: 50 SOLUTION OPHTHALMIC at 21:49

## 2018-10-15 RX ADMIN — CARVEDILOL SCH MG: 3.12 TABLET, FILM COATED ORAL at 21:50

## 2018-10-15 NOTE — PROVIDER PROGRESS NOTE
Subjective





- Prog Note Date


Prog Note Date: 10/15/18


Prog Note Time: 16:07





- Subjective


Pt reports feeling: No change


Subjective: 


Fes is minimally responsive on exam today.  He appears comfortable and offers no

complaints.  His wife and son are at the bedside, planning to meet with social 

work regarding next steps in higher level of care as it is not recommended that 

he return home given his profound weakness and frequent falls.





Current Medications





- Current Medications


Current Medications: 





Active Medications





Acetaminophen (Tylenol)  650 mg PO Q4HR PRN


   PRN Reason: Pain 1 to 4


Aspirin (St Yosvany Aspirin)  81 mg PO DAILY [facility]


   Last Admin: 10/15/18 08:24 Dose:  81 mg


Atorvastatin Calcium (Lipitor)  40 mg PO QPM [facility]


   Last Admin: 10/14/18 19:56 Dose:  40 mg


Carvedilol (Coreg)  3.125 mg PO BID [facility]


   Last Admin: 10/15/18 08:24 Dose:  3.125 mg


Doxazosin Mesylate (Cardura)  4 mg PO QPM [facility]


   Last Admin: 10/14/18 19:56 Dose:  4 mg


Enoxaparin Sodium (Lovenox)  40 mg SUBQ DAILY [facility]


   Last Admin: 10/15/18 08:25 Dose:  40 mg


Famotidine (Pepcid)  20 mg PO BID [facility]


   Last Admin: 10/15/18 08:25 Dose:  20 mg


Ferrous Sulfate (Feosol)  325 mg PO 1000 [facility]


   Last Admin: 10/15/18 10:35 Dose:  325 mg


Fluticasone Propionate (Flonase)  0 sprays TRAE DAILY [facility]


   Last Admin: 10/15/18 08:32 Dose:  1 spr


Latanoprost (Xalatan Ophth Drops)  1 drops EACHEYE QPM [facility]


   Last Admin: 10/14/18 19:58 Dose:  1 drops


Levothyroxine Sodium (Synthroid)  100 mcg PO QDAC [facility]


   Last Admin: 10/15/18 06:36 Dose:  100 mcg


Lisinopril (Zestril)  2.5 mg PO DAILY [facility]


   Last Admin: 10/15/18 08:25 Dose:  2.5 mg


Magnesium Oxide (Mag Ox)  400 mg PO 1000 [facility]


   Last Admin: 10/15/18 10:35 Dose:  400 mg


Ondansetron HCl (Zofran Inj)  4 mg IVP Q6HR PRN


   PRN Reason: Nausea / Vomiting


(Pilocarpine Hcl [ Pilocarpine Hcl] 5 Mg) Tab  1 each PO TID [facility]


   Last Admin: 10/15/18 13:08 Dose:  Not Given


Polyethylene Glycol (Miralax)  17 gm PO DAILY [facility]


   Last Admin: 10/15/18 08:38 Dose:  Not Given


Prochlorperazine Edisylate (Compazine Inj)  10 mg IVP Q6HR PRN


   PRN Reason: Nausea / Vomiting


Promethazine HCl (Phenergan Inj)  25 mg IM Q6HR PRN


   PRN Reason: Nausea / Vomiting


Sodium Chloride (Normal Saline Flush 0.9%)  10 ml IVP PRN PRN


   PRN Reason: AS NEEDED PER PROVIDER ORDERS


Sodium Chloride (Normal Saline Flush 0.9%)  10 ml IVP 0100,0900,1700 [facility]


   Last Admin: 10/15/18 08:33 Dose:  10 ml


Tamsulosin HCl (Flomax)  0.4 mg PO DAILY [facility]


   Last Admin: 10/15/18 08:25 Dose:  0.4 mg


Timolol Maleate (Timoptic 0.5% Ophth Drops)  1 drops EACHEYE DAILY [facility]


   Last Admin: 10/15/18 08:34 Dose:  1 drops


Zolpidem Tartrate (Ambien)  10 mg PO QPM PRN


   PRN Reason: Insomnia





                                        





Aspirin 81 mg PO DAILY 07/16/17 


Tamsulosin HCl 0.4 mg PO DAILY 07/16/17 


Zolpidem Tartrate 10 mg PO QPM PRN 07/16/17 


Latanoprost 0.005% Ophth Drops [Xalatan Ophth Drops] 1 drops EACHEYE QPM 

02/17/18 


Timolol 0.5% Ophth Drops [Timoptic 0.5% Ophth Drops] 1 drops EACHEYE DAILY 

02/17/18 


Doxazosin [Cardura] 4 mg PO QPM 06/25/18 


Levothyroxine Sodium [Synthroid] 100 mcg PO QDAC 07/07/18 


Pilocarpine HCl 5 mg PO TID 07/07/18 


raNITIdine [Zantac] 150 mg PO DAILY 07/07/18 


Atorvastatin Calcium 40 mg PO QPM 09/22/18 


Carvedilol 3.125 mg PO BID 09/22/18 


Fluticasone [Flonase] 2 sprays TRAE DAILY 09/22/18 


Furosemide 20 mg PO DAILY 09/22/18 


Lisinopril 5 mg PO DAILY 09/22/18 


Ipratropium [Atrovent] 2 puffs IH BID 10/02/18 


Clopidogrel Bisulfate [Clopidogrel] 75 mg PO DAILY 10/12/18 











Objective





- Vital Signs/Intake & Output


Reviewed Vital Signs: Yes


Vital Signs: 


                                Vital Signs x48h











  Temp Pulse Resp BP Pulse Ox


 


 10/15/18 15:41  36.2 C L  61  18  115/62  97











Intake & Output: 


                                 Intake & Output











 10/12/18 10/13/18 10/14/18 10/15/18





 23:59 23:59 23:59 23:59


 


Intake Total 4633 2980 470 200


 


Output Total 900 1400 800 500


 


Balance 3733 2447 -074 -300














- Objective


General Appearance: positive: No acute distress, Lethargic


Eyes Bilateral: positive: No lid inflammation


Eyes: OU Conjunctivae pale


ENT: positive: Dry mucous membranes


Neck: positive: No JVD, Trachea midline


Respiratory: positive: Chest non-tender, No respiratory distress, Rhonchi


Cardiovascular: positive: No gallop, Irregularly irregular, Systolic murmur


Peripheral Pulses: 1+ Radial (R), 1+ Radial (L)


Abdomen: positive: Tenderness, Guarding, Abnml bowel sounds


Back: positive: Nml inspection


Skin: positive: No rash, Warm, Dry, Cyanosis, Pallor


Extremities: positive: Non-tender, No pedal edema, Joint swelling


Neurologic/Psychiatric: positive: Disoriented to person, Disoriented to place, 

Disoriented to time, Weakness, Sensory loss, Slurred/abnml speech, Depressed 

mood/affect


Reflexes: Bicep (R): 3+, Bicep (L): 3+





- Lab Results


Fish Bones: 


                                 10/15/18 05:29





                                 10/14/18 05:15


Other Labs: 


                               Lab Results x24hrs











  10/15/18 Range/Units





  05:29 


 


WBC  9.3  (4.8-10.8)  x10^3/uL


 


RBC  3.19 L  (4.70-6.10)  10^6/uL


 


Hgb  9.9 L  (14.0-18.0)  g/dL


 


Hct  29.7 L  (42.0-52.0)  %


 


MCV  93.0  (80.0-94.0)  fL


 


MCH  31.0  (27.0-31.0)  pg


 


MCHC  33.3  (32.0-36.0)  g/dL


 


RDW  15.1 H  (12.0-15.0)  %


 


Plt Count  185  (130-450)  10^3/uL


 


MPV  8.3  (7.4-11.4)  fL


 


Neut # (Auto)  7.9 H  (1.5-6.6)  10^3/uL


 


Lymph # (Auto)  0.7 L  (1.5-3.5)  10^3/uL


 


Mono # (Auto)  0.5  (0.0-1.0)  10^3/uL


 


Eos # (Auto)  0.2  (0.0-0.7)  10^3/uL


 


Baso # (Auto)  0.0  (0.0-0.1)  10^3/uL


 


Absolute Nucleated RBC  0.00  x10^3/uL


 


Nucleated RBC %  0.0  /100WBC














ABX Reporting


Has patient been on IV antibiotics over the past 48 hours?: No





Assessment/Plan





- Problem List


(1) Generalized weakness


Impression: 


Patient presented with generalized weakness and appears to be secondary to 

dehydration from poor oral intake, electrolyte abnormalities, and continued 

diuretics.  He has been able to ambulate with PT, but after this activity, the 

patient becomes extremely weak and has profound fatigue.  As per physical 

therapy reports, the patient was initially able demonstrate up to 150 feet of 

ambulation, but becomes profoundly weak and appears hypoxic.  Today, PT reports;

 the patient was agreeable w/some persuading to get up and work with PT; pt. 

reports he needs to go to the bathroom as well.  He refuses to walk any distance

 as his profound weakness does not allow. 


Plan:  Continue daily PT and encourage to the chair for meals.








(2) Fall


Impression: 


The patient is reported to have fallen at home without being able to get up on 

his own.  When asking his wife, who apparently was present during this time, she

 has no recollection of any of these events.  She is also very confused.  The 

patient has been seen or admitted more than an acceptable amount over the past 

few months, so this issue will be difficult to resolve and NOT having a safe 

discharge plan will be the most likely barrier to discharge.  The patient's son,

 Narendra met with social work and myself regarding next steps in plan of care.  He

 is agreeable to a Palliative consult.


Plan:  Continue to monitor and daily PT.


Qualifiers: 


   Encounter type: initial encounter   Qualified Code(s): W19.XXXA - Unspecified

 fall, initial encounter   





(3) Dementia


Impression: 


During this hospital stay, the patient has been very fatigued and thankful about

 being here as he has been falling at home, but today is minimally response and 

appears comfortable.  The patient has difficulty with stating the complexity of 

his medical conditions, and wishes not to eat.  He states, "I just sleep most of

 the time".  He has had a more rapid decline in his mentation, physical 

abilities, and poor eating that began after September 1, 2018.  To predict 

progression of dementia, the expected course of events may be that the patient 

becomes bed-bound in the next 1.5 months similar to the current time line given 

is severely poor PO intake, lack of motivation and previous progression.


Plan:  Palliative care consult, fall precautions.








(4) Abdominal pain


Impression: 


The patient states that his stomach pain is about the same each day and 

continues to have a very poor appetite.  The most likely cause of this is his 

known mesenteric chronic ischemia that becomes worse when the patient is 

dehydrated, which may be leading to his falls.  He also explains that his 

abdominal pain restricts him from eating regular meals.  He is able to tolerate 

protein shakes, but he only is promoted to drink these with encouragement.


Plan:  continue to treat pain and encourage meals.  Nutrition is following.


Qualifiers: 


   Abdominal location: generalized   Qualified Code(s): R10.84 - Generalized 

abdominal pain   





(5) Protein-calorie malnutrition, moderate


Impression: 


The patient underwent a nutritional consult and is noted to have lost at least 

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 

dementia.  As per chart review, he only consumes bites during meals with the 

highest amount being 25%.  He is incontinent of urine.  He is working with PT, 

when he does not refuse.


Plan:  High calorie foods, supplemental protein shakes and up to the chair for 

meals with staff encouragement of PO intake.

## 2018-10-16 LAB
ALBUMIN DIAFP-MCNC: 2.5 G/DL (ref 3.2–5.5)
ALBUMIN/GLOB SERPL: 1.1 {RATIO} (ref 1–2.2)
ALP SERPL-CCNC: 64 IU/L (ref 42–121)
ALT SERPL W P-5'-P-CCNC: 20 IU/L (ref 10–60)
ANION GAP SERPL CALCULATED.4IONS-SCNC: 5 MMOL/L (ref 6–13)
AST SERPL W P-5'-P-CCNC: 16 IU/L (ref 10–42)
BASOPHILS NFR BLD AUTO: 0 10^3/UL (ref 0–0.1)
BASOPHILS NFR BLD AUTO: 0.6 %
BILIRUB BLD-MCNC: 0.7 MG/DL (ref 0.2–1)
BUN SERPL-MCNC: 12 MG/DL (ref 6–20)
CALCIUM UR-MCNC: 7.9 MG/DL (ref 8.5–10.3)
CHLORIDE SERPL-SCNC: 101 MMOL/L (ref 101–111)
CO2 SERPL-SCNC: 27 MMOL/L (ref 21–32)
CREAT SERPLBLD-SCNC: 1 MG/DL (ref 0.6–1.2)
EOSINOPHIL # BLD AUTO: 0.1 10^3/UL (ref 0–0.7)
EOSINOPHIL NFR BLD AUTO: 1.9 %
ERYTHROCYTE [DISTWIDTH] IN BLOOD BY AUTOMATED COUNT: 15.2 % (ref 12–15)
GFRSERPLBLD MDRD-ARVRAT: 70 ML/MIN/{1.73_M2} (ref 89–?)
GLOBULIN SER-MCNC: 2.3 G/DL (ref 2.1–4.2)
GLUCOSE SERPL-MCNC: 82 MG/DL (ref 70–100)
HGB UR QL STRIP: 9.5 G/DL (ref 14–18)
LYMPHOCYTES # SPEC AUTO: 0.7 10^3/UL (ref 1.5–3.5)
LYMPHOCYTES NFR BLD AUTO: 9.8 %
MCH RBC QN AUTO: 31.1 PG (ref 27–31)
MCHC RBC AUTO-ENTMCNC: 33.7 G/DL (ref 32–36)
MCV RBC AUTO: 92.4 FL (ref 80–94)
MONOCYTES # BLD AUTO: 0.6 10^3/UL (ref 0–1)
MONOCYTES NFR BLD AUTO: 9.5 %
NEUTROPHILS # BLD AUTO: 5.2 10^3/UL (ref 1.5–6.6)
NEUTROPHILS # SNV AUTO: 6.7 X10^3/UL (ref 4.8–10.8)
NEUTROPHILS NFR BLD AUTO: 78.2 %
PDW BLD AUTO: 8.8 FL (ref 7.4–11.4)
PLATELET # BLD: 172 10^3/UL (ref 130–450)
PROT SPEC-MCNC: 4.8 G/DL (ref 6.7–8.2)
RBC MAR: 3.05 10^6/UL (ref 4.7–6.1)
SODIUM SERPLBLD-SCNC: 133 MMOL/L (ref 135–145)

## 2018-10-16 RX ADMIN — SODIUM CHLORIDE, PRESERVATIVE FREE SCH ML: 5 INJECTION INTRAVENOUS at 08:13

## 2018-10-16 RX ADMIN — FLUTICASONE PROPIONATE SCH SPR: 50 SPRAY, METERED NASAL at 08:13

## 2018-10-16 RX ADMIN — ATORVASTATIN CALCIUM SCH MG: 40 TABLET, FILM COATED ORAL at 20:22

## 2018-10-16 RX ADMIN — FERROUS SULFATE TAB 325 MG (65 MG ELEMENTAL FE) SCH MG: 325 (65 FE) TAB at 10:39

## 2018-10-16 RX ADMIN — POLYETHYLENE GLYCOL 3350 SCH: 17 POWDER, FOR SOLUTION ORAL at 08:17

## 2018-10-16 RX ADMIN — TIMOLOL MALEATE SCH DROPS: 5 SOLUTION OPHTHALMIC at 08:18

## 2018-10-16 RX ADMIN — CARVEDILOL SCH MG: 3.12 TABLET, FILM COATED ORAL at 08:13

## 2018-10-16 RX ADMIN — Medication SCH MG: at 10:39

## 2018-10-16 RX ADMIN — SODIUM CHLORIDE, PRESERVATIVE FREE SCH ML: 5 INJECTION INTRAVENOUS at 18:52

## 2018-10-16 RX ADMIN — ASPIRIN SCH MG: 81 TABLET, CHEWABLE ORAL at 08:13

## 2018-10-16 RX ADMIN — FAMOTIDINE SCH MG: 20 TABLET, FILM COATED ORAL at 20:22

## 2018-10-16 RX ADMIN — FAMOTIDINE SCH MG: 20 TABLET, FILM COATED ORAL at 08:13

## 2018-10-16 RX ADMIN — TAMSULOSIN HYDROCHLORIDE SCH MG: 0.4 CAPSULE ORAL at 08:13

## 2018-10-16 RX ADMIN — SODIUM CHLORIDE, PRESERVATIVE FREE SCH ML: 5 INJECTION INTRAVENOUS at 23:47

## 2018-10-16 RX ADMIN — LISINOPRIL SCH MG: 5 TABLET ORAL at 08:13

## 2018-10-16 RX ADMIN — DOXAZOSIN SCH MG: 4 TABLET ORAL at 20:21

## 2018-10-16 RX ADMIN — ENOXAPARIN SODIUM SCH MG: 100 INJECTION SUBCUTANEOUS at 10:39

## 2018-10-16 RX ADMIN — CARVEDILOL SCH MG: 3.12 TABLET, FILM COATED ORAL at 20:22

## 2018-10-16 RX ADMIN — LEVOTHYROXINE SODIUM SCH MCG: 0.1 TABLET ORAL at 06:32

## 2018-10-16 RX ADMIN — LATANOPROST SCH DROPS: 50 SOLUTION OPHTHALMIC at 20:24

## 2018-10-16 NOTE — CONSULTATION NOTE
Palliative Care Consultation





- Referral


Referring Provider: Manju AMAYA


Time of Visit: 10:00-10:15; 12:


Referral setting: Hospitalized patient


Referral Reason: Dementia/Failure to Thrive/Protien Calore Malnutrition/Goals of

Care





- Information Sources


Records reviewed: RN notes reviewed, Previous records reviewed


History/Review of Systems obtained from: Other (dependent on records for 

history; son with limited knowledge of patients history; and patient / wife can

not participate)


Exam limitations: Clinical condition (patient with vascular dementia; though 

able to engage in conversation; not accurate)





- History of Present Illness


Brief History of Present Illness: 


This is an 89-year-old gentleman who has a complex history, is unable to 

participate in reviewing, so most of this is taken from medical records.  

Patient does appear to be a failure to thrive, has had over a dozen interactions

with the healthcare system this last year, a significant number of those with 

[facility], up to [facility] on  for N STEMI, and in 

reports appears to be followed by a vascular surgeon.  Patient's history and 

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

history of CVAs, with documented vascular dementia, atrial fib not on 

anticoagulant secondary to multiple falls, and noted mesenteric ischemia with 

chronic abdominal pain which is being considered the underlying etiology for 

patient's anorexia, and ongoing weight loss.  Most of patient's admits have been

for the sequela of falls, more recently for abdominal pain, multiple UTIs 

secondary to BPH, and intermittent dehydration.  Of note it does appear with 

each admit, he has had progressive weight loss, cognitive decline, functional 

decline, and does not appear to have much follow-through after his 

hospitalizations.  This has been complicated by the lack of social support, wife

has dementia as well, and though they have 3 sons, they are just now being drawn

into decision making and the urgent need for a safe discharge plan. It has 

fallen to Narendra Garcia who lives here in [place], obviously overwhelming to 

now try and get the lay of the land.





Patient presents with complaints of abdominal pain, anorexia, just "does not 

feel like eating".  Patient does have abdominal tenderness to palpation.  He 

does appear quite thin and cachectic he is 5 foot 10, with a weight of 116, on 

an admit 18 he was 134.7.  Patient denies shortness of breath, but does 

complain of dizziness and weakness when asked to demonstrate sit to stand.  

Patient has been eating less than 25% of his meals, is using supplemental 

shakes.  He is currently hydrated.  He is incontinent of urine.  Patient reports

he "sleeps all the time".  He does understand he is in [place], but has no 

other insight into his current situation.  When asked how he would call the 

nurse, is unable to explain to me.  He is very pleasant, engages in 

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  Unable to tell me

anything else about this hospitalization, his family, or his care at home.It 

does appear he is in working with PT, and has come to a place where he can 

functionally walk with a walker, but needs cueing.  Patient's falls have been 

mostly related to his ongoing recurrent dehydration, weakness, and recurrent 

infections. Thus making him a difficult candidate for assisted living as cannot 

direct his care or summon assistance if needed, would recommend a dementia unit 

where supervision would be available. 








Medical/Surgical History





- Past Medical History


Cardiovascular: reports: Congestive heart failure, Hypertension, High 

cholesterol, MI, Atrial fibrillation


Respiratory: reports: Shortness of breath


Neuro: Dementia, CVA


Endocrine/Autoimmune: reports: HyPOthyroidism


GI: reports: Other (mesenteric ischemia per records)


: reports: Benign prostate hypertrophy, Incontinence, Chronic bladder infe

ction


HEENT: reports: Chronic hearing loss


Musculoskeletal: reports: Osteoarthritis, Fatigue


Derm: reports: None


MRSA Hx?: No





- Past Surgical History


Ortho: reports: Hip replacement





Social History





- Living Situation


Living arrangement: At home


Living Situation: With spouse/s.o.


Support System: 


Patient has been at home, with his wife.  With very little socialization or 

oversight.  Suspect he has had poor medication adherence, as well as follow-up 

appropriate with medical personnel.  When met with wife and son, wife appears 

significantly overwhelmed, unable to provide any sense of history, son feels 

though she is able to manage independently at home without supervision at this 

point in time.








Family History





- Family History


Family History: Mother:  (old age 94; unknown), Father: 





Medications/Allergies





- Medications


Active Medication List: 





Active Medications





Acetaminophen (Tylenol)  650 mg PO Q4HR PRN


   PRN Reason: Pain 1 to 4


Aspirin ([place] Yosvany Aspirin)  81 mg PO DAILY [facility]


   Last Admin: 10/16/18 08:13 Dose:  81 mg


Atorvastatin Calcium (Lipitor)  40 mg PO QPM [facility]


   Last Admin: 10/15/18 21:48 Dose:  40 mg


Carvedilol (Coreg)  3.125 mg PO BID [facility]


   Last Admin: 10/16/18 08:13 Dose:  3.125 mg


Doxazosin Mesylate (Cardura)  4 mg PO QPM [facility]


   Last Admin: 10/15/18 21:48 Dose:  4 mg


Enoxaparin Sodium (Lovenox)  40 mg SUBQ DAILY [facility]


   Last Admin: 10/16/18 10:39 Dose:  40 mg


Famotidine (Pepcid)  20 mg PO BID [facility]


   Last Admin: 10/16/18 08:13 Dose:  20 mg


Ferrous Sulfate (Feosol)  325 mg PO 1000 [facility]


   Last Admin: 10/16/18 10:39 Dose:  325 mg


Fluticasone Propionate (Flonase)  0 sprays TRAE DAILY [facility]


   Last Admin: 10/16/18 08:13 Dose:  1 spr


Latanoprost (Xalatan Ophth Drops)  1 drops EACHEYE QPM [facility]


   Last Admin: 10/15/18 21:49 Dose:  1 drops


Levothyroxine Sodium (Synthroid)  100 mcg PO QDAC [facility]


   Last Admin: 10/16/18 06:32 Dose:  100 mcg


Lisinopril (Zestril)  2.5 mg PO DAILY [facility]


   Last Admin: 10/16/18 08:13 Dose:  2.5 mg


Magnesium Oxide (Mag Ox)  400 mg PO 1000 [facility]


   Last Admin: 10/16/18 10:39 Dose:  400 mg


Ondansetron HCl (Zofran Inj)  4 mg IVP Q6HR PRN


   PRN Reason: Nausea / Vomiting


(Pilocarpine Hcl [ Pilocarpine Hcl] 5 Mg) Tab  1 each PO TID [facility]


   Last Admin: 10/16/18 12:06 Dose:  Not Given


Polyethylene Glycol (Miralax)  17 gm PO DAILY [facility]


   Last Admin: 10/16/18 08:17 Dose:  Not Given


Prochlorperazine Edisylate (Compazine Inj)  10 mg IVP Q6HR PRN


   PRN Reason: Nausea / Vomiting


Promethazine HCl (Phenergan Inj)  25 mg IM Q6HR PRN


   PRN Reason: Nausea / Vomiting


Sodium Chloride (Normal Saline Flush 0.9%)  10 ml IVP PRN PRN


   PRN Reason: AS NEEDED PER PROVIDER ORDERS


Sodium Chloride (Normal Saline Flush 0.9%)  10 ml IVP 0100,0900,1700 [facility]


   Last Admin: 10/16/18 18:52 Dose:  10 ml


Tamsulosin HCl (Flomax)  0.4 mg PO DAILY [facility]


   Last Admin: 10/16/18 08:13 Dose:  0.4 mg


Timolol Maleate (Timoptic 0.5% Ophth Drops)  1 drops EACHEYE DAILY [facility]


   Last Admin: 10/16/18 08:18 Dose:  1 drops


Zolpidem Tartrate (Ambien)  10 mg PO QPM PRN


   PRN Reason: Insomnia





                                        





Aspirin 81 mg PO DAILY 17 


Tamsulosin HCl 0.4 mg PO DAILY 17 


Zolpidem Tartrate 10 mg PO QPM PRN 17 


Latanoprost 0.005% Ophth Drops [Xalatan Ophth Drops] 1 drops EACHEYE QPM 

18 


Timolol 0.5% Ophth Drops [Timoptic 0.5% Ophth Drops] 1 drops EACHEYE DAILY 

18 


Doxazosin [Cardura] 4 mg PO QPM 18 


Levothyroxine Sodium [Synthroid] 100 mcg PO QDAC 18 


Pilocarpine HCl 5 mg PO TID 18 


raNITIdine [Zantac] 150 mg PO DAILY 18 


Atorvastatin Calcium 40 mg PO QPM 18 


Carvedilol 3.125 mg PO BID 18 


Fluticasone [Flonase] 2 sprays TRAE DAILY 18 


Furosemide 20 mg PO DAILY 18 


Lisinopril 5 mg PO DAILY 18 


Ipratropium [Atrovent] 2 puffs IH BID 10/02/18 


Clopidogrel Bisulfate [Clopidogrel] 75 mg PO DAILY 10/12/18 











- Allergies


Allergies/Adverse Reactions: 


                                    Allergies











Allergy/AdvReac Type Severity Reaction Status Date / Time


 


No Known Drug Allergies Allergy   Verified 10/11/18 19:46














Review of Systems





- Constitutional


Constitutional: reports: Fatigue, Weight loss (116; bmi 16.8 weight loss since 

 134.7 9 % weight loss; dietician reports 16% weight loss in last 9 month)





- Eyes


Eyes: reports: Vision loss





- Ears, Nose & Throat


Ears, Nose & Throat: reports: Hearing loss





- Cardiovascular


Cardiovascular: reports: Decr. exercise tolerance





- Respiratory


Respiratory: reports: SOB with exertion





- Gastrointestinal


Gastrointestinal: reports: Abdominal pain, Poor appetite, Early satiety.  

denies: Reflux/heartburn





- Genitourinary


Genitourinary: reports: Incontinence





- Musculoskeletal


Musculoskeletal: reports: Limited range of motion, Muscle weakness, Assistive 

devices (uses walker/one person assist per report)





- Integumentary


Integumentary: reports: Dryness





- Neurological


Neurological: reports: General weakness, Memory problems





- Psychiatric


Psychiatric: reports: Depression (suspect though cannot confirm)





- Endocrine


Endocrine: reports: Hypothyroidism, Intolerance to cold





- Hematologic/Lymphatic


Hematologic/Lymphatic: reports: Anemia (28.2 hct), Recurrent infections 

(frequent UTIs)





- All Other Systems


All Other Systems: reports: Reviewed and negative





Physical Exam





- Vital Signs


Vital Signs: 





                                Vital Signs x48h











  Temp Pulse Resp BP Pulse Ox


 


 10/16/18 15:32  36.3 C L  68  16  121/59 L  96














- Physical Exam


General Appearance: positive: Mild distress, Lethargic


Eyes Bilateral: positive: Normal inspection


ENT: negative: Pharyngeal erythema, Oral lesions


Neck: positive: No JVD, Trachea midline


Cardiovascular: positive: Regular rate & rhythm


Respiratory: positive: No respiratory distress, Diminished in bases


Abdomen: positive: Soft, Abnml bowel sounds (hyperactive), Tenderness


Skin: positive: Pallor, Dryness, Bruising


Extremities: positive: No pedal edema


Neurologic/Psychiatric: positive: Disoriented to time, Weakness, Flat affect





Palliative Care





- POLST


Patient has POLST: Yes


POLST Status: DNR, Comfort Measures (filled out with Dr. Jay in September; in 

records)


Pain: Location (abdominal; vague in description)


Tiredness/Fatigue: Severe (7-10) ("sleep all the time")





- Palliative Care


Discussion: 


Family meeting his present myself Narendra Garcia and wife.  Narendra has been trying 

to figure out the finances, making arrangements to be financial and durable 

power of health , does recognize wife is not able to participate any 

more in decision-making.  Narendra shared patient himself was a difficult person, 

is glad to see in his developing dementia, he is quite pleasant and easy to get 

along with.  This is not their history as adults, and have been somewhat 

estranged. He would like to make it possible for his Mother to stay in her own 

home, as she is familiar with this and seems to be according to him functioning 

independently there.  He does recognize cannot have the father return there, but

 concerned about wiping out what financial means there is in his care.  He is 

looking for guidance as far as patient's prognosis, recommendations we have 

palliative versus hospice, and placement options as well as other resources that

 may be available.  Patient is not hooked up with VA benefits, does not have 

disability, but would be considered of that.  


Patient does present with a terminal diagnosis of malnutrition, failure to 

thrive, mesenteric ischemia, multiple co-morbidities, with decreasing functional

 and cognitive status.  I shared I do believe he would most likely meet hospice 

criteria, but hospice is not pay for room and board.  But can be supportive and 

help oversee patient's care, to better manage his ongoing decline.  In the 

context of goals of care, he does not perceive his father as having a long life 

expectancy, or good quality of life.  Counseling provided regarding the hospice 

team and support that can be given, but the 2 components of a prognosis less 

than 6 months as well as family goals need to be consistent with comfort need to

 be understood.  This can be delivered in dementia units, and or at a nursing [facility].  In reviewing patient's past history and current support system, wife would 

not be able to manage oversight of care plan, so paid caregivers in the home 

would not be an option.  They are hoping to find something in [place], my 

recommendations would be to explore a dementia unit, I do not believe he has the

 capability to transition to hospice and then decline at assisted living.  His 

pattern has been to get tuned up, hydrated, and return back to his home setting 

to deteriorate fairly rapidly.  There have been continual ED visits, OBS, and 

stays over the last few months. Patient does have a [facility] filled up with his 

doctor, Dr. Jay.  It is a do not attempt resuscitation and comfort focused 

care.  Though the diagnosis is dementia noted on form.  Patients can definitely 

participate in the decision, but would recommend we revisit this when Narendra has 

DPOA paperwork completed, to have resigned.  After my meeting, they were meeting

 with  for further discussion regarding YADIRA, placement options, 

and documentation needed for follow-up. When asked wife how she is feeling or 

her concerns regarding conversation about hospice and end-of-life care, she she 

reports she is just overwhelmed, and is happy to have Narendra participating.


When met with patient earlier patient does not present with decision-making 

capacity, does not understand his current medical condition, the concerns 

regarding safety, and /or the fact will not be returning home. 








Results





- Lab Results


Lab results reviewed: Yes


Fish Bones: 


                                 10/16/18 08:34





                                 10/16/18 08:34


Lab and Imaging Results: 





                               Lab Results x24hrs











  10/16/18 10/16/18 10/16/18 Range/Units





  08:34 08:34 08:34 


 


WBC    6.7  (4.8-10.8)  x10^3/uL


 


RBC    3.05 L  (4.70-6.10)  10^6/uL


 


Hgb    9.5 L  (14.0-18.0)  g/dL


 


Hct    28.2 L  (42.0-52.0)  %


 


MCV    92.4  (80.0-94.0)  fL


 


MCH    31.1 H  (27.0-31.0)  pg


 


MCHC    33.7  (32.0-36.0)  g/dL


 


RDW    15.2 H  (12.0-15.0)  %


 


Plt Count    172  (130-450)  10^3/uL


 


MPV    8.8  (7.4-11.4)  fL


 


Neut # (Auto)    5.2  (1.5-6.6)  10^3/uL


 


Lymph # (Auto)    0.7 L  (1.5-3.5)  10^3/uL


 


Mono # (Auto)    0.6  (0.0-1.0)  10^3/uL


 


Eos # (Auto)    0.1  (0.0-0.7)  10^3/uL


 


Baso # (Auto)    0.0  (0.0-0.1)  10^3/uL


 


Absolute Nucleated RBC    0.00  x10^3/uL


 


Nucleated RBC %    0.0  /100WBC


 


Sodium   133 L   (135-145)  mmol/L


 


Potassium   4.3   (3.5-5.0)  mmol/L


 


Chloride   101   (101-111)  mmol/L


 


Carbon Dioxide   27   (21-32)  mmol/L


 


Anion Gap   5.0 L   (6-13)  


 


BUN   12   (6-20)  mg/dL


 


Creatinine   1.0   (0.6-1.2)  mg/dL


 


Estimated GFR (MDRD)   70 L   (>89)  


 


Glucose   82   ()  mg/dL


 


Calcium   7.9 L   (8.5-10.3)  mg/dL


 


Total Bilirubin   0.7   (0.2-1.0)  mg/dL


 


AST   16   (10-42)  IU/L


 


ALT   20   (10-60)  IU/L


 


Alkaline Phosphatase   64   ()  IU/L


 


Total Protein   4.8 L   (6.7-8.2)  g/dL


 


Albumin   2.5 L   (3.2-5.5)  g/dL


 


Globulin   2.3   (2.1-4.2)  g/dL


 


Albumin/Globulin Ratio   1.1   (1.0-2.2)  


 


TSH  2.69    (0.34-5.60)  uIU/mL














Impression and Recommendations





- Palliative Care


Impression: 


This is an 89-year-old gentleman who presents with protein calorie malnutrition,

 multiple comorbidities, complex social situation, vascular dementia, ongoing 

functional and cognitive decline, significant history of ongoing falls related 

to gait ataxia, muscle wasting, balance issues, and recurrent hypotension 

related to dehydration.He also has recurrent bladder infections secondary to 

BPH, incontinence, anorexia, and abdominal pain.  Palliative care meeting with 

family to help define goals of care as patient is transitioning to a safer 

setting.





Recommendations/Counseling Done: 


1. Vascular dementia.  Patient presents with ongoing cognitive decline, unable 

to participate in decision-making, does seem to be in a safe environment.  

Patient does need cueing for ambulation, encouragement to eat, and frequent 

monitoring related to fall risk and impulsivity. 


2. Protein calorie malnutrition.  Suspect there will be little impact on this, 

as patient's underlying etiology is abdominal pain with eating, anorexia, with 

recurrent dehydration.  Patient is transition to hospice, can concentrate on 

eating for comfort, though encourage use of supplements and shakes, this does 

appear something patient can tolerate and takes less energy on his behalf.





3.  Recurrent falls.  This is multifactorial in etiology, patient does appear to

 have recurrent hypotension when gets dehydrated, gait ataxia secondary to 

history of strokes, poor judgment and impulsivity with his dementia. Will need 

to look at ways to minimize risk for injury in new setting. Patient may do 

better with wheelchair for mobility.


4. Advanced care planning.  Patient unable to participate secondary to dementia,

 patient does not present with decision-making capacity.  Patient's wife also 

appears to have significant cognitive deficits feeling overwhelmed with the 

current situation, and has not been able to participate or follow through on a 

home care plan previous to this.  This is falling out of [facility] space 

[phone]. He has taken this week off and possibly next to help with his 

transition plan trying to find a safe and affordable option for where to have 

his father, does not feel mother at this point in time needs placement. He would

 like to pursue hospice support in new setting, with the focus on comfort, no 

further hospitalizations, and allow natural death.  Does not see the recurrent 

hospitalizations, ED visits, as quality of life.  He is pursuing D POA both for 

financial and medical, to be able to assist his parents in this transition time.

 


Consult with medical hospice director, did feel patient met criteria with 

malnutrition is terminal diagnosis supported by his multiple comorbidities 

including mesenteric ischemia, atrial fib not on anticoagulant, combined chronic

 systolic and diastolic heart failure, abdominal aortic aneurysm status post 

stent, CKD stage III, history of CVA, BPH, and continued functional and 

cognitive decline. Will have hospitalist send order.





Time Spent: 


45 minutes with greater than 50% of this done in counseling regarding goals of 

care with family, coordination of care with hospice team  and 

hospitalist.

## 2018-10-16 NOTE — PROVIDER PROGRESS NOTE
Subjective





- Prog Note Date


Prog Note Date: 10/16/18





- Subjective


Pt reports feeling: No change


Subjective: 


pt denies complaints, he is comfortable ate his breakfast.








Current Medications





- Current Medications


Current Medications: 





Active Medications





Acetaminophen (Tylenol)  650 mg PO Q4HR PRN


   PRN Reason: Pain 1 to 4


Aspirin (St Yosvany Aspirin)  81 mg PO DAILY [facility]


   Last Admin: 10/16/18 08:13 Dose:  81 mg


Atorvastatin Calcium (Lipitor)  40 mg PO QPM [facility]


   Last Admin: 10/15/18 21:48 Dose:  40 mg


Carvedilol (Coreg)  3.125 mg PO BID [facility]


   Last Admin: 10/16/18 08:13 Dose:  3.125 mg


Doxazosin Mesylate (Cardura)  4 mg PO QPM [facility]


   Last Admin: 10/15/18 21:48 Dose:  4 mg


Enoxaparin Sodium (Lovenox)  40 mg SUBQ DAILY [facility]


   Last Admin: 10/16/18 10:39 Dose:  40 mg


Famotidine (Pepcid)  20 mg PO BID [facility]


   Last Admin: 10/16/18 08:13 Dose:  20 mg


Ferrous Sulfate (Feosol)  325 mg PO 1000 [facility]


   Last Admin: 10/16/18 10:39 Dose:  325 mg


Fluticasone Propionate (Flonase)  0 sprays TRAE DAILY [facility]


   Last Admin: 10/16/18 08:13 Dose:  1 spr


Latanoprost (Xalatan Ophth Drops)  1 drops EACHEYE QPM [facility]


   Last Admin: 10/15/18 21:49 Dose:  1 drops


Levothyroxine Sodium (Synthroid)  100 mcg PO QDAC [facility]


   Last Admin: 10/16/18 06:32 Dose:  100 mcg


Lisinopril (Zestril)  2.5 mg PO DAILY [facility]


   Last Admin: 10/16/18 08:13 Dose:  2.5 mg


Magnesium Oxide (Mag Ox)  400 mg PO 1000 [facility]


   Last Admin: 10/16/18 10:39 Dose:  400 mg


Ondansetron HCl (Zofran Inj)  4 mg IVP Q6HR PRN


   PRN Reason: Nausea / Vomiting


(Pilocarpine Hcl [ Pilocarpine Hcl] 5 Mg) Tab  1 each PO TID [facility]


   Last Admin: 10/16/18 12:06 Dose:  Not Given


Polyethylene Glycol (Miralax)  17 gm PO DAILY [facility]


   Last Admin: 10/16/18 08:17 Dose:  Not Given


Prochlorperazine Edisylate (Compazine Inj)  10 mg IVP Q6HR PRN


   PRN Reason: Nausea / Vomiting


Promethazine HCl (Phenergan Inj)  25 mg IM Q6HR PRN


   PRN Reason: Nausea / Vomiting


Sodium Chloride (Normal Saline Flush 0.9%)  10 ml IVP PRN PRN


   PRN Reason: AS NEEDED PER PROVIDER ORDERS


Sodium Chloride (Normal Saline Flush 0.9%)  10 ml IVP 0100,0900,1700 [facility]


   Last Admin: 10/16/18 08:13 Dose:  10 ml


Tamsulosin HCl (Flomax)  0.4 mg PO DAILY [facility]


   Last Admin: 10/16/18 08:13 Dose:  0.4 mg


Timolol Maleate (Timoptic 0.5% Ophth Drops)  1 drops EACHEYE DAILY [facility]


   Last Admin: 10/16/18 08:18 Dose:  1 drops


Zolpidem Tartrate (Ambien)  10 mg PO QPM PRN


   PRN Reason: Insomnia





                                        





Aspirin 81 mg PO DAILY 07/16/17 


Tamsulosin HCl 0.4 mg PO DAILY 07/16/17 


Zolpidem Tartrate 10 mg PO QPM PRN 07/16/17 


Latanoprost 0.005% Ophth Drops [Xalatan Ophth Drops] 1 drops EACHEYE QPM 

02/17/18 


Timolol 0.5% Ophth Drops [Timoptic 0.5% Ophth Drops] 1 drops EACHEYE DAILY 

02/17/18 


Doxazosin [Cardura] 4 mg PO QPM 06/25/18 


Levothyroxine Sodium [Synthroid] 100 mcg PO QDAC 07/07/18 


Pilocarpine HCl 5 mg PO TID 07/07/18 


raNITIdine [Zantac] 150 mg PO DAILY 07/07/18 


Atorvastatin Calcium 40 mg PO QPM 09/22/18 


Carvedilol 3.125 mg PO BID 09/22/18 


Fluticasone [Flonase] 2 sprays TRAE DAILY 09/22/18 


Furosemide 20 mg PO DAILY 09/22/18 


Lisinopril 5 mg PO DAILY 09/22/18 


Ipratropium [Atrovent] 2 puffs IH BID 10/02/18 


Clopidogrel Bisulfate [Clopidogrel] 75 mg PO DAILY 10/12/18 











Objective





- Vital Signs/Intake & Output


Reviewed Vital Signs: Yes


Vital Signs: 


                                Vital Signs x48h











  Temp Pulse Resp BP Pulse Ox


 


 10/16/18 15:32  36.3 C L  68  16  121/59 L  96











Intake & Output: 


                                 Intake & Output











 10/13/18 10/14/18 10/15/18 10/16/18





 23:59 23:59 23:59 23:59


 


Intake Total 2980 470 670 450


 


Output Total 1400 800 875 850


 


Balance 4520 -762 -205 -400














- Objective


General Appearance: positive: No acute distress, Alert.  negative: Lethargic


Eyes Bilateral: positive: Normal inspection, PERRL, No lid inflammation, Conjunc

tivae nml


ENT: positive: ENT inspection nml, Pharynx nml, No signs of dehydration.  

negative: Purulent nasal drainage, Pharyngeal erythema, Oral lesions


Neck: positive: Nml inspection, Thyroid nml, No JVD, Trachea midline.  negative:

 Thyromegaly, Lymphadenopathy (R), Lymphadenopathy (L), Stiff neck, 

Swelling/bruising, Tracheal deviation


Respiratory: positive: Chest non-tender, No respiratory distress, Breath sounds 

nml.  negative: Wheezes, Rales, Rhonchi


Cardiovascular: positive: Regular rate & rhythm, No murmur, No gallop.  

negative: Irregularly irregular, Extrasystoles, Tachycardia, Bradycardia, JVD 

present, Systolic murmur, Diastolic murmur


Peripheral Pulses: 2+ Radial (R), 2+ Radial (L), 2+ Dorsalis pedis (R), 2+ 

Dorsalis pedis (L)


Abdomen: positive: Non-tender, No organomegaly, Nml bowel sounds, No distention.

  negative: Tenderness, Guarding, Rebound


Back: positive: Nml inspection.  negative: CVA tenderness (R), CVA tenderness 

(L)


Skin: positive: Color nml, No rash, Warm, Dry.  negative: Cyanosis, Diaphoresis,

 Pallor


Extremities: positive: Non-tender, Full ROM, Nml appearance.  negative: Calf 

tenderness, Joint swelling, Denise's sign/cords


Neurologic/Psychiatric: positive: Oriented x3, Sensation nml, Mood/affect nml.  

negative: Weakness, Sensory loss, Facial droop, Slurred/abnml speech, Depressed 

mood/affect





- Lab Results


Fish Bones: 


                                 10/16/18 08:34





                                 10/16/18 08:34


Other Labs: 


                               Lab Results x24hrs











  10/16/18 10/16/18 10/16/18 Range/Units





  08:34 08:34 08:34 


 


WBC    6.7  (4.8-10.8)  x10^3/uL


 


RBC    3.05 L  (4.70-6.10)  10^6/uL


 


Hgb    9.5 L  (14.0-18.0)  g/dL


 


Hct    28.2 L  (42.0-52.0)  %


 


MCV    92.4  (80.0-94.0)  fL


 


MCH    31.1 H  (27.0-31.0)  pg


 


MCHC    33.7  (32.0-36.0)  g/dL


 


RDW    15.2 H  (12.0-15.0)  %


 


Plt Count    172  (130-450)  10^3/uL


 


MPV    8.8  (7.4-11.4)  fL


 


Neut # (Auto)    5.2  (1.5-6.6)  10^3/uL


 


Lymph # (Auto)    0.7 L  (1.5-3.5)  10^3/uL


 


Mono # (Auto)    0.6  (0.0-1.0)  10^3/uL


 


Eos # (Auto)    0.1  (0.0-0.7)  10^3/uL


 


Baso # (Auto)    0.0  (0.0-0.1)  10^3/uL


 


Absolute Nucleated RBC    0.00  x10^3/uL


 


Nucleated RBC %    0.0  /100WBC


 


Sodium   133 L   (135-145)  mmol/L


 


Potassium   4.3   (3.5-5.0)  mmol/L


 


Chloride   101   (101-111)  mmol/L


 


Carbon Dioxide   27   (21-32)  mmol/L


 


Anion Gap   5.0 L   (6-13)  


 


BUN   12   (6-20)  mg/dL


 


Creatinine   1.0   (0.6-1.2)  mg/dL


 


Estimated GFR (MDRD)   70 L   (>89)  


 


Glucose   82   ()  mg/dL


 


Calcium   7.9 L   (8.5-10.3)  mg/dL


 


Total Bilirubin   0.7   (0.2-1.0)  mg/dL


 


AST   16   (10-42)  IU/L


 


ALT   20   (10-60)  IU/L


 


Alkaline Phosphatase   64   ()  IU/L


 


Total Protein   4.8 L   (6.7-8.2)  g/dL


 


Albumin   2.5 L   (3.2-5.5)  g/dL


 


Globulin   2.3   (2.1-4.2)  g/dL


 


Albumin/Globulin Ratio   1.1   (1.0-2.2)  


 


TSH  2.69    (0.34-5.60)  uIU/mL














ABX Reporting


Has patient been on IV antibiotics over the past 48 hours?: No





Assessment/Plan





- Problem List


(1) Generalized weakness


Impression: 


(1) Generalized weakness


Impression: 


10/16 pt has frequent fall, and weakness.


continue PT/OT





Patient presented with generalized weakness and appears to be secondary to deh

ydration from poor oral intake, electrolyte abnormalities, and continued 

diuretics.  He has been able to ambulate with PT, but after this activity, the 

patient becomes extremely weak and has profound fatigue.  As per physical 

therapy reports, the patient was initially able demonstrate up to 150 feet of 

ambulation, but becomes profoundly weak and appears hypoxic.  Today, PT reports;

 the patient was agreeable w/some persuading to get up and work with PT; pt. 

reports he needs to go to the bathroom as well.  He refuses to walk any distance

 as his profound weakness does not allow. 


Plan:  Continue daily PT and encourage to the chair for meals.








(2) Fall


Impression: 


10/16 continue fall precaution


continue PT/OT


consult with palliative care, will follow up





The patient is reported to have fallen at home without being able to get up on 

his own.  When asking his wife, who apparently was present during this time, she

 has no recollection of any of these events.  She is also very confused.  The 

patient has been seen or admitted more than an acceptable amount over the past 

few months, so this issue will be difficult to resolve and NOT having a safe 

discharge plan will be the most likely barrier to discharge.  The patient's son,

 Narendra met with social work and myself regarding next steps in plan of care.  He

 is agreeable to a Palliative consult.


Plan:  Continue to monitor and daily PT.








(3) Dementia


Impression: 


During this hospital stay, the patient has been very fatigued and thankful about

 being here as he has been falling at home, but today is minimally response and 

appears comfortable.  The patient has difficulty with stating the complexity of 

his medical conditions, and wishes not to eat.  He states, "I just sleep most of

 the time".  He has had a more rapid decline in his mentation, physical 

abilities, and poor eating that began after September 1, 2018.  To predict 

progression of dementia, the expected course of events may be that the patient 

becomes bed-bound in the next 1.5 months similar to the current time line given 

is severely poor PO intake, lack of motivation and previous progression.


Plan:  Palliative care consult, fall precautions.








(4) Abdominal pain


Impression: 


10/16 chronic abdominal pain, intermittent. pain control





The patient states that his stomach pain is about the same each day and 

continues to have a very poor appetite.  The most likely cause of this is his 

known mesenteric chronic ischemia that becomes worse when the patient is 

dehydrated, which may be leading to his falls.  He also explains that his 

abdominal pain restricts him from eating regular meals.  He is able to tolerate 

protein shakes, but he only is promoted to drink these with encouragement.


Plan:  continue to treat pain and encourage meals.  Nutrition is following.





(5) Protein-calorie malnutrition, moderate


Impression: 


The patient underwent a nutritional consult and is noted to have lost at least 

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 

dementia.  As per chart review, he only consumes bites during meals with the 

highest amount being 25%.  He is incontinent of urine.  He is working with PT, 

when he does not refuse.


Plan:  High calorie foods, supplemental protein shakes and up to the chair for 

meals with staff encouragement of PO intake.

## 2018-10-17 LAB
ALBUMIN DIAFP-MCNC: 2.5 G/DL (ref 3.2–5.5)
ALBUMIN/GLOB SERPL: 1.1 {RATIO} (ref 1–2.2)
ALP SERPL-CCNC: 67 IU/L (ref 42–121)
ALT SERPL W P-5'-P-CCNC: 20 IU/L (ref 10–60)
ANION GAP SERPL CALCULATED.4IONS-SCNC: 7 MMOL/L (ref 6–13)
AST SERPL W P-5'-P-CCNC: 17 IU/L (ref 10–42)
BASOPHILS NFR BLD AUTO: 0 10^3/UL (ref 0–0.1)
BASOPHILS NFR BLD AUTO: 0.5 %
BILIRUB BLD-MCNC: < 0.2 MG/DL (ref 0.2–1)
BUN SERPL-MCNC: 14 MG/DL (ref 6–20)
CALCIUM UR-MCNC: 8 MG/DL (ref 8.5–10.3)
CHLORIDE SERPL-SCNC: 101 MMOL/L (ref 101–111)
CO2 SERPL-SCNC: 26 MMOL/L (ref 21–32)
CREAT SERPLBLD-SCNC: 1.1 MG/DL (ref 0.6–1.2)
EOSINOPHIL # BLD AUTO: 0.1 10^3/UL (ref 0–0.7)
EOSINOPHIL NFR BLD AUTO: 1.7 %
ERYTHROCYTE [DISTWIDTH] IN BLOOD BY AUTOMATED COUNT: 14.8 % (ref 12–15)
GFRSERPLBLD MDRD-ARVRAT: 63 ML/MIN/{1.73_M2} (ref 89–?)
GLOBULIN SER-MCNC: 2.2 G/DL (ref 2.1–4.2)
GLUCOSE SERPL-MCNC: 83 MG/DL (ref 70–100)
HGB UR QL STRIP: 10 G/DL (ref 14–18)
LYMPHOCYTES # SPEC AUTO: 0.9 10^3/UL (ref 1.5–3.5)
LYMPHOCYTES NFR BLD AUTO: 14.5 %
MAGNESIUM SERPL-MCNC: 1.9 MG/DL (ref 1.7–2.8)
MCH RBC QN AUTO: 30.6 PG (ref 27–31)
MCHC RBC AUTO-ENTMCNC: 33.1 G/DL (ref 32–36)
MCV RBC AUTO: 92.5 FL (ref 80–94)
MONOCYTES # BLD AUTO: 0.6 10^3/UL (ref 0–1)
MONOCYTES NFR BLD AUTO: 9.3 %
NEUTROPHILS # BLD AUTO: 4.5 10^3/UL (ref 1.5–6.6)
NEUTROPHILS # SNV AUTO: 6.1 X10^3/UL (ref 4.8–10.8)
NEUTROPHILS NFR BLD AUTO: 74 %
PDW BLD AUTO: 8.6 FL (ref 7.4–11.4)
PLATELET # BLD: 176 10^3/UL (ref 130–450)
PROT SPEC-MCNC: 4.7 G/DL (ref 6.7–8.2)
RBC MAR: 3.27 10^6/UL (ref 4.7–6.1)
SODIUM SERPLBLD-SCNC: 134 MMOL/L (ref 135–145)

## 2018-10-17 RX ADMIN — FLUTICASONE PROPIONATE SCH SPR: 50 SPRAY, METERED NASAL at 10:41

## 2018-10-17 RX ADMIN — ATORVASTATIN CALCIUM SCH MG: 40 TABLET, FILM COATED ORAL at 20:31

## 2018-10-17 RX ADMIN — DOXAZOSIN SCH MG: 4 TABLET ORAL at 20:31

## 2018-10-17 RX ADMIN — ASPIRIN SCH MG: 81 TABLET, CHEWABLE ORAL at 10:40

## 2018-10-17 RX ADMIN — TIMOLOL MALEATE SCH DROPS: 5 SOLUTION OPHTHALMIC at 10:42

## 2018-10-17 RX ADMIN — SODIUM CHLORIDE, PRESERVATIVE FREE SCH ML: 5 INJECTION INTRAVENOUS at 19:43

## 2018-10-17 RX ADMIN — TAMSULOSIN HYDROCHLORIDE SCH MG: 0.4 CAPSULE ORAL at 10:40

## 2018-10-17 RX ADMIN — SODIUM CHLORIDE, PRESERVATIVE FREE SCH ML: 5 INJECTION INTRAVENOUS at 10:39

## 2018-10-17 RX ADMIN — Medication SCH MG: at 10:40

## 2018-10-17 RX ADMIN — CARVEDILOL SCH MG: 3.12 TABLET, FILM COATED ORAL at 20:31

## 2018-10-17 RX ADMIN — FAMOTIDINE SCH MG: 20 TABLET, FILM COATED ORAL at 10:40

## 2018-10-17 RX ADMIN — FERROUS SULFATE TAB 325 MG (65 MG ELEMENTAL FE) SCH MG: 325 (65 FE) TAB at 10:40

## 2018-10-17 RX ADMIN — LEVOTHYROXINE SODIUM SCH MCG: 0.1 TABLET ORAL at 06:13

## 2018-10-17 RX ADMIN — POLYETHYLENE GLYCOL 3350 SCH GM: 17 POWDER, FOR SOLUTION ORAL at 10:39

## 2018-10-17 RX ADMIN — ENOXAPARIN SODIUM SCH MG: 100 INJECTION SUBCUTANEOUS at 10:39

## 2018-10-17 RX ADMIN — LISINOPRIL SCH MG: 5 TABLET ORAL at 10:40

## 2018-10-17 RX ADMIN — CARVEDILOL SCH MG: 3.12 TABLET, FILM COATED ORAL at 10:40

## 2018-10-17 RX ADMIN — LATANOPROST SCH DROPS: 50 SOLUTION OPHTHALMIC at 20:32

## 2018-10-17 RX ADMIN — FAMOTIDINE SCH MG: 20 TABLET, FILM COATED ORAL at 20:32

## 2018-10-17 NOTE — PROVIDER PROGRESS NOTE
Subjective





- Prog Note Date


Prog Note Date: 10/17/18





- Subjective


Pt reports feeling: No change


Subjective: 


pt comfortable eat his breakfast, denies any complains, no abdominal pain, CP, 

fever, chill, SOB. [facility] nurse will assess pt.








Current Medications





- Current Medications


Current Medications: 





Active Medications





Acetaminophen (Tylenol)  650 mg PO Q4HR PRN


   PRN Reason: Pain 1 to 4


Aspirin (St Yosvany Aspirin)  81 mg PO DAILY [facility]


   Last Admin: 10/17/18 10:40 Dose:  81 mg


Atorvastatin Calcium (Lipitor)  40 mg PO QPM [facility]


   Last Admin: 10/16/18 20:22 Dose:  40 mg


Carvedilol (Coreg)  3.125 mg PO BID [facility]


   Last Admin: 10/17/18 10:40 Dose:  3.125 mg


Doxazosin Mesylate (Cardura)  4 mg PO QPM [facility]


   Last Admin: 10/16/18 20:21 Dose:  4 mg


Enoxaparin Sodium (Lovenox)  40 mg SUBQ DAILY [facility]


   Last Admin: 10/17/18 10:39 Dose:  40 mg


Famotidine (Pepcid)  20 mg PO BID [facility]


   Last Admin: 10/17/18 10:40 Dose:  20 mg


Ferrous Sulfate (Feosol)  325 mg PO 1000 [facility]


   Last Admin: 10/17/18 10:40 Dose:  325 mg


Fluticasone Propionate (Flonase)  0 sprays TRAE DAILY [facility]


   Last Admin: 10/17/18 10:41 Dose:  1 spr


Latanoprost (Xalatan Ophth Drops)  1 drops EACHEYE QPM [facility]


   Last Admin: 10/16/18 20:24 Dose:  1 drops


Levothyroxine Sodium (Synthroid)  100 mcg PO QDAC [facility]


   Last Admin: 10/17/18 06:13 Dose:  100 mcg


Lisinopril (Zestril)  2.5 mg PO DAILY [facility]


   Last Admin: 10/17/18 10:40 Dose:  2.5 mg


Magnesium Oxide (Mag Ox)  400 mg PO 1000 [facility]


   Last Admin: 10/17/18 10:40 Dose:  400 mg


Ondansetron HCl (Zofran Inj)  4 mg IVP Q6HR PRN


   PRN Reason: Nausea / Vomiting


(Pilocarpine Hcl [ Pilocarpine Hcl] 5 Mg) Tab  1 each PO TID [facility]


   Last Admin: 10/17/18 14:54 Dose:  Not Given


Polyethylene Glycol (Miralax)  17 gm PO DAILY [facility]


   Last Admin: 10/17/18 10:39 Dose:  17 gm


Prochlorperazine Edisylate (Compazine Inj)  10 mg IVP Q6HR PRN


   PRN Reason: Nausea / Vomiting


Promethazine HCl (Phenergan Inj)  25 mg IM Q6HR PRN


   PRN Reason: Nausea / Vomiting


Sodium Chloride (Normal Saline Flush 0.9%)  10 ml IVP PRN PRN


   PRN Reason: AS NEEDED PER PROVIDER ORDERS


Sodium Chloride (Normal Saline Flush 0.9%)  10 ml IVP 0100,0900,1700 [facility]


   Last Admin: 10/17/18 10:39 Dose:  10 ml


Tamsulosin HCl (Flomax)  0.4 mg PO DAILY [facility]


   Last Admin: 10/17/18 10:40 Dose:  0.4 mg


Timolol Maleate (Timoptic 0.5% Ophth Drops)  1 drops EACHEYE DAILY [facility]


   Last Admin: 10/17/18 10:42 Dose:  1 drops


Zolpidem Tartrate (Ambien)  10 mg PO QPM PRN


   PRN Reason: Insomnia





                                        





Aspirin 81 mg PO DAILY 07/16/17 


Tamsulosin HCl 0.4 mg PO DAILY 07/16/17 


Zolpidem Tartrate 10 mg PO QPM PRN 07/16/17 


Latanoprost 0.005% Ophth Drops [Xalatan Ophth Drops] 1 drops EACHEYE QPM 

02/17/18 


Timolol 0.5% Ophth Drops [Timoptic 0.5% Ophth Drops] 1 drops EACHEYE DAILY 

02/17/18 


Doxazosin [Cardura] 4 mg PO QPM 06/25/18 


Levothyroxine Sodium [Synthroid] 100 mcg PO QDAC 07/07/18 


Pilocarpine HCl 5 mg PO TID 07/07/18 


raNITIdine [Zantac] 150 mg PO DAILY 07/07/18 


Atorvastatin Calcium 40 mg PO QPM 09/22/18 


Carvedilol 3.125 mg PO BID 09/22/18 


Fluticasone [Flonase] 2 sprays TRAE DAILY 09/22/18 


Furosemide 20 mg PO DAILY 09/22/18 


Lisinopril 5 mg PO DAILY 09/22/18 


Ipratropium [Atrovent] 2 puffs IH BID 10/02/18 


Clopidogrel Bisulfate [Clopidogrel] 75 mg PO DAILY 10/12/18 











Objective





- Vital Signs/Intake & Output


Reviewed Vital Signs: Yes


Vital Signs: 


                                Vital Signs x48h











  Temp Pulse Resp BP Pulse Ox


 


 10/17/18 08:00  36.4 C L  63  18  136/59 H  94











Intake & Output: 


                                 Intake & Output











 10/14/18 10/15/18 10/16/18 10/17/18





 23:59 23:59 23:59 23:59


 


Intake Total 470 670 687 620


 


Output Total  1925


 


Balance -330 -205 -463 -1305














- Objective


General Appearance: positive: No acute distress, Alert.  negative: Lethargic


Eyes Bilateral: positive: Normal inspection, PERRL, No lid inflammation, 

Conjunctivae nml


ENT: positive: ENT inspection nml, Pharynx nml, No signs of dehydration.  

negative: Purulent nasal drainage, Pharyngeal erythema, Oral lesions


Neck: positive: Nml inspection, Thyroid nml, No JVD, Trachea midline.  negative:

Thyromegaly, Lymphadenopathy (R), Lymphadenopathy (L), Stiff neck, 

Swelling/bruising, Tracheal deviation


Respiratory: positive: Chest non-tender, No respiratory distress, Breath sounds 

nml.  negative: Wheezes, Rales


Cardiovascular: positive: Regular rate & rhythm, No murmur, No gallop.  negativ

e: Irregularly irregular, Extrasystoles, Tachycardia, Bradycardia, JVD present, 

Systolic murmur, Diastolic murmur


Peripheral Pulses: 2+ Radial (R), 2+ Radial (L), 2+ Dorsalis pedis (R), 2+ 

Dorsalis pedis (L)


Abdomen: positive: Non-tender, No organomegaly, Nml bowel sounds, No distention.

 negative: Tenderness, Guarding, Rebound


Back: positive: Nml inspection.  negative: CVA tenderness (R), CVA tenderness 

(L)


Skin: positive: Color nml, No rash, Warm, Dry.  negative: Cyanosis, Diaphoresis,

Pallor


Extremities: positive: Non-tender, Nml appearance.  negative: Calf tenderness, 

Joint swelling, Denise's sign/cords


Neurologic/Psychiatric: positive: Sensation nml, Mood/affect nml.  negative: 

Weakness, Sensory loss, Facial droop, Slurred/abnml speech, Depressed 

mood/affect





- Lab Results


Fish Bones: 


                                 10/17/18 05:43





                                 10/17/18 05:43


Other Labs: 


                               Lab Results x24hrs











  10/17/18 10/17/18 Range/Units





  05:43 05:43 


 


WBC   6.1  (4.8-10.8)  x10^3/uL


 


RBC   3.27 L  (4.70-6.10)  10^6/uL


 


Hgb   10.0 L  (14.0-18.0)  g/dL


 


Hct   30.3 L  (42.0-52.0)  %


 


MCV   92.5  (80.0-94.0)  fL


 


MCH   30.6  (27.0-31.0)  pg


 


MCHC   33.1  (32.0-36.0)  g/dL


 


RDW   14.8  (12.0-15.0)  %


 


Plt Count   176  (130-450)  10^3/uL


 


MPV   8.6  (7.4-11.4)  fL


 


Neut # (Auto)   4.5  (1.5-6.6)  10^3/uL


 


Lymph # (Auto)   0.9 L  (1.5-3.5)  10^3/uL


 


Mono # (Auto)   0.6  (0.0-1.0)  10^3/uL


 


Eos # (Auto)   0.1  (0.0-0.7)  10^3/uL


 


Baso # (Auto)   0.0  (0.0-0.1)  10^3/uL


 


Absolute Nucleated RBC   0.00  x10^3/uL


 


Nucleated RBC %   0.1  /100WBC


 


Sodium  134 L   (135-145)  mmol/L


 


Potassium  3.9   (3.5-5.0)  mmol/L


 


Chloride  101   (101-111)  mmol/L


 


Carbon Dioxide  26   (21-32)  mmol/L


 


Anion Gap  7.0   (6-13)  


 


BUN  14   (6-20)  mg/dL


 


Creatinine  1.1   (0.6-1.2)  mg/dL


 


Estimated GFR (MDRD)  63 L   (>89)  


 


Glucose  83   ()  mg/dL


 


Calcium  8.0 L   (8.5-10.3)  mg/dL


 


Magnesium  1.9   (1.7-2.8)  mg/dL


 


Total Bilirubin  < 0.2 L   (0.2-1.0)  mg/dL


 


AST  17   (10-42)  IU/L


 


ALT  20   (10-60)  IU/L


 


Alkaline Phosphatase  67   ()  IU/L


 


Total Protein  4.7 L   (6.7-8.2)  g/dL


 


Albumin  2.5 L   (3.2-5.5)  g/dL


 


Globulin  2.2   (2.1-4.2)  g/dL


 


Albumin/Globulin Ratio  1.1   (1.0-2.2)  














ABX Reporting


Has patient been on IV antibiotics over the past 48 hours?: No





Assessment/Plan





- Problem List


(1) Generalized weakness


Impression: 


Impression: 


10/17, pt present generalized weakness, physical decondition, malnutrition, loss

of weight, advanced dementia. palliative care and hospice care consulted, will 

follow up





10/16 pt has frequent fall, and weakness.


continue PT/OT











(2) Fall


Impression: 


10/17 fall precaution, consult with advance care of palliative care and hospice 

care 





10/16 continue fall precaution


continue PT/OT


consult with palliative care, will follow up











(3) Dementia


10/17 advanced dementia, very limited capacity to make his own decision.


continue support, full care .








(4) Abdominal pain


Impression: 


10/17 pt has chronic mesenteric ischemia, 


pain control, comfort care





10/16 chronic abdominal pain, intermittent. pain control








(5) Protein-calorie malnutrition, moderate


10/17 consulted nutritionist, followup 





(6) systolic CHF


10/17 EF 40-45%, systolic CHF, continue ACE, beta-blocker, loop diuretics.





(7) physical decondition


10/17 generalized weakness, physical decondition, malnutrition, loss of weight, 

advanced dementia


follow up palliative and hospice care consult





(8) hx of AAA, repaired 


stable

## 2018-10-18 ENCOUNTER — HOSPITAL ENCOUNTER (OUTPATIENT)
Dept: HOSPITAL 76 - EMS | Age: 83
Discharge: HOME | End: 2018-10-18
Attending: SURGERY
Payer: MEDICARE

## 2018-10-18 VITALS — SYSTOLIC BLOOD PRESSURE: 109 MMHG | DIASTOLIC BLOOD PRESSURE: 49 MMHG

## 2018-10-18 DIAGNOSIS — Z74.01: ICD-10-CM

## 2018-10-18 DIAGNOSIS — E46: ICD-10-CM

## 2018-10-18 DIAGNOSIS — F03.90: Primary | ICD-10-CM

## 2018-10-18 DIAGNOSIS — I50.20: ICD-10-CM

## 2018-10-18 RX ADMIN — LISINOPRIL SCH: 5 TABLET ORAL at 09:36

## 2018-10-18 RX ADMIN — SODIUM CHLORIDE, PRESERVATIVE FREE SCH: 5 INJECTION INTRAVENOUS at 06:24

## 2018-10-18 RX ADMIN — LEVOTHYROXINE SODIUM SCH MCG: 0.1 TABLET ORAL at 06:32

## 2018-10-18 RX ADMIN — POLYETHYLENE GLYCOL 3350 SCH GM: 17 POWDER, FOR SOLUTION ORAL at 09:40

## 2018-10-18 RX ADMIN — CARVEDILOL SCH: 3.12 TABLET, FILM COATED ORAL at 09:38

## 2018-10-18 RX ADMIN — ASPIRIN SCH MG: 81 TABLET, CHEWABLE ORAL at 09:39

## 2018-10-18 RX ADMIN — ENOXAPARIN SODIUM SCH MG: 100 INJECTION SUBCUTANEOUS at 09:39

## 2018-10-18 RX ADMIN — TAMSULOSIN HYDROCHLORIDE SCH MG: 0.4 CAPSULE ORAL at 09:39

## 2018-10-18 RX ADMIN — FLUTICASONE PROPIONATE SCH SPR: 50 SPRAY, METERED NASAL at 09:40

## 2018-10-18 RX ADMIN — FAMOTIDINE SCH MG: 20 TABLET, FILM COATED ORAL at 09:39

## 2018-10-18 RX ADMIN — TIMOLOL MALEATE SCH DROPS: 5 SOLUTION OPHTHALMIC at 09:40

## 2018-10-18 RX ADMIN — SODIUM CHLORIDE, PRESERVATIVE FREE SCH ML: 5 INJECTION INTRAVENOUS at 09:39

## 2018-10-18 RX ADMIN — Medication SCH MG: at 09:39

## 2018-10-18 RX ADMIN — FERROUS SULFATE TAB 325 MG (65 MG ELEMENTAL FE) SCH MG: 325 (65 FE) TAB at 09:38

## 2018-10-18 NOTE — DISCHARGE SUMMARY
Discharge Summary


Discharge Date: 10/18/18


Discharging Provider: SANDRA ELLINGTON


Primary Care Provider: Cesario Early


Condition at Discharge: Poor


Discharge Disposition: 50 Hospice/Home DC/Xfer


Discharge Facility Name: [facility]





- DIAGNOSES


Admission Diagnoses: 


(1) Hypokalemia








(2) Hypomagnesemia








(3) Protein-calorie malnutrition, moderate








(4) Generalized weakness








(5) CHF (congestive heart failure)








(6) HTN (hypertension)





(7) Hypothyroidism





Discharge Diagnoses with Status of Each Condition: 


(1) Generalized weakness


profound weakness, physical decondition, follow up hospice care


(2) Fall


frequent fall, follow up hospice care


(3) Dementia


advanced dementia, follow up hospice care


(4) Abdominal pain


chronic abdominal pain, follow up hospice care


(5) Protein-calorie malnutrition


follow up hospice care


(6) systolic CHF


stable, follow up hospice care


(7) physical decondition


follow up hospice care


(8) AAA, repaired 


stable





- HPI


History of Present Illness: 


refer from Dr. Shankar's HPI on 10/11/18 as the following:


Patient is an 89-year-old gentleman with a past medical history significant for 

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 

recent echocardiogram showing an ejection fraction of 40-45%, abdominal aortic 

aneurysm status post stent, likely mesenteric ischemia with chronic abdominal 

pain, osteoarthritis, hypertension, obstructive sleep apnea, hypothyroidism, 

chronic back pain, CKD stage III, history of CVA, dementia, BPH and atrial 

fibrillation who presented to the emergency department with a chief complaint of

generalized weakness.  The patient has been hospitalized twice in the past month

for abdominal pain and is thought to have chronic mesenteric ischemia which 

seems to cause increased abdominal pain when the patient becomes dehydrated or 

hypotensive.  The patient was most recently in the emergency department with 

abdominal pain 1 week earlier.  The patient has since been diagnosed with a 

urinary tract infection and was started on nitrofurantoin.  The patient states 

that over the last week he has had decreased appetite with decreased p.o. intake

of fluids and food.  He states that his mouth is been extremely dry and he has 

been having increased urinary frequency.  The patient states that today when he 

was walking he fell to the floor because of weakness and could not get back up. 

The patient states that he was walking with his walker to his bed and states he 

was so weak he just could not move.  He states that he felt his legs giving out 

on him and slowly fell to the floor.  The patient denies hitting his head and 

does admit to some back pain.  When the patient's wife saw him lying on the 

floor she immediately called EMS.  The patient was brought to the emergency 

department.  The patient denies any fevers or chills.  He denies any abdominal 

pain, nausea, vomiting, diarrhea or any constipation.





Patient denies any headaches, blurred vision, runny nose, sore throat, nasal 

congestion, difficulty swallowing, chest pain, shortness of air, orthopnea, PND,

increased lower extremity swelling, dysuria, joint swelling, muscle aches, neck 

stiffness, recent unintentional weight loss, skin rash, skin changes, hair loss,

night sweats, polyuria, polydipsia or any focal neurologic deficits.





On presentation to the emergency department the patient was afebrile and heart 

rate was slightly elevated at 90 patient's blood pressure was normal and he was 

not in any respiratory distress.  The patient underwent routine lab work which 

revealed no leukocytosis, a chronic anemia, a mildly decreased magnesium level 

of 1.6 and a severely decreased potassium of 2.3.  The patient has been on Lasix

at home for his congestive heart failure and this in combination with the fact 

the patient has had poor appetite and poor oral intake it appears that he is 

developed severe hypokalemia.  The patient's UA was negative for ongoing 

infection.  The patient was placed in observation for potassium replacement and 

IV fluids as he appeared to be dehydrated on examination.








- CONSULTS | PROCEDURES


Consultations: pallitive and hospice care





- HOSPITAL COURSE


Hospital Course: 


pt was admitted for generalized weakness and frequent fall. pt was found to have

advanced dementia, malnutrition, systolic CHF, physical decondition. pt request 

hospice care. pt was d/c to [facility] for hospice care








- ALLERGIES


Allergies/Adverse Reactions: 


                                    Allergies











Allergy/AdvReac Type Severity Reaction Status Date / Time


 


No Known Drug Allergies Allergy   Verified 10/11/18 19:46














- MEDICATIONS


Home Medications: 


                                Ambulatory Orders











 Medication  Instructions  Recorded  Confirmed


 


Aspirin 81 mg PO DAILY 07/16/17 10/12/18


 


Tamsulosin HCl 0.4 mg PO DAILY 07/16/17 10/12/18


 


Zolpidem Tartrate 10 mg PO QPM PRN 07/16/17 10/12/18


 


Latanoprost 0.005% Ophth Drops 1 drops EACHEYE QPM 02/17/18 10/12/18





[Xalatan Ophth Drops]   


 


Timolol 0.5% Ophth Drops [Timoptic 1 drops EACHEYE DAILY 02/17/18 10/12/18





0.5% Ophth Drops]   


 


Doxazosin [Cardura] 4 mg PO QPM 06/25/18 10/12/18


 


Levothyroxine Sodium [Synthroid] 100 mcg PO QDAC 07/07/18 10/12/18


 


Pilocarpine HCl 5 mg PO TID 07/07/18 10/12/18


 


raNITIdine [Zantac] 150 mg PO DAILY 07/07/18 10/12/18


 


Atorvastatin Calcium 40 mg PO QPM 09/22/18 10/12/18


 


Carvedilol 3.125 mg PO BID 09/22/18 10/02/18


 


Fluticasone [Flonase] 2 sprays TRAE DAILY 09/22/18 10/12/18


 


Furosemide 20 mg PO DAILY 09/22/18 10/02/18


 


Lisinopril 5 mg PO DAILY 09/22/18 10/02/18


 


Ferrous Sulfate 325 mg PO DAILY #30 tablet 09/23/18 10/02/18


 


Ipratropium [Atrovent] 2 puffs IH BID 10/02/18 10/12/18


 


Clopidogrel Bisulfate [Clopidogrel] 75 mg PO DAILY 10/12/18 


 


LORazepam [Ativan] 0.5 mg PO Q6H PRN #15 tablet 10/18/18 


 


Morphine Sulfate [Morphine Sulf 5 mg PO Q4H PRN #30 ml 10/18/18 





Oral (Roxanol)]   














- PHYSICAL EXAM AT DISCHARGE


General Appearance: positive: No acute distress, Alert.  negative: Lethargic


Eyes Bilateral: positive: Normal inspection, PERRL, No lid inflammation, 

Conjunctivae nml


ENT: positive: ENT inspection nml, Pharynx nml, No signs of dehydration.  

negative: Purulent nasal drainage, Pharyngeal erythema, Oral lesions


Neck: positive: Nml inspection, Thyroid nml, No JVD, Trachea midline.  negative:

Stiff neck, Tracheal deviation


Respiratory: positive: Chest non-tender, No respiratory distress, Breath sounds 

nml.  negative: Wheezes, Rales, Rhonchi


Cardiovascular: positive: Regular rate & rhythm, Irregularly irregular, Systolic

murmur.  negative: Extrasystoles, Tachycardia, Bradycardia


Peripheral Pulses: positive: 2+


Abdomen: positive: Non-tender, No organomegaly, Nml bowel sounds, No distention.

 negative: Tenderness, Guarding, Rebound


Back: positive: Nml inspection


Skin: positive: No rash, Warm, Dry, Pallor.  negative: Cyanosis, Diaphoresis


Extremities: positive: Non-tender.  negative: Calf tenderness, Denise's 

sign/cords


Neurologic/Psychiatric: positive: Sensation nml, Mood/affect nml, Weakness.  

negative: Sensory loss, Facial droop, Slurred/abnml speech, Depressed 

mood/affect





- LABS


Result Diagrams: 


                                 10/17/18 05:43





                                 10/17/18 05:43





- FOLLOW UP


Follow Up: 


follow up hospice care








- TIME SPENT


Time Spent in Discharge (Minutes): 50

## 2018-10-18 NOTE — DISCHARGE PLAN
Discharge Plan for SNF / [facility]





- Discharge Plan And Transition Orders


Disposition: 50 Hospice/Home DC/Xfer


Condition: Poor


Allergies and Adverse Reactions: 


                                    Allergies











Allergy/AdvReac Type Severity Reaction Status Date / Time


 


No Known Drug Allergies Allergy   Verified 10/11/18 19:46














- SNF / [facility] Transition Orders


Admit to (Facility): [facility]


Under the care of (Name): hospice care, Dr. Goldberg


Discharge Diagnosis: 


dementia, heart failure, mesenteric ischemia, afib, AAA, CKD, hx of CVA





Medicare Certification Statement: 


I do not certify that [facility] skilled nursing care is medically necessary 

on a continuing basis for any of the conditions for which she/he is receiving 

care during hospitalization.





Notify PCP of admission and forward orders to primary provider for signature.





Other Notification Orders: 


Call PCP immediately if patient develops dyspnea, chest pain/tightness or edema.





Additional Bowel Program Orders: 


If no BM after 2 days, nurse may give M.O.M. 30ml PO PRN and/or ducolax Supp 1 

CT and/or OSWALDO 250mg P.O., and/or senna 1-2 tabs PO.  On day 3 nurse may give 

repeat above order until residents constipation is resolved. 





Treatments & Other Orders: pt may followup hospice care after pt is arrival to 

[facility]


Medication Orders: 





PLEASE REFER TO THE DISCHARGE MEDICATION LIST.








Insulin Orders?: No





- Medications


New Prescriptions: 


LORazepam [Ativan] 0.5 mg PO Q6H PRN #15 tablet


 PRN Reason: Anxiety


Morphine Sulfate [Morphine Sulf Oral (Roxanol)] 5 mg PO Q4H PRN #30 ml


 PRN Reason: Pain/Dyspnea





- Diet


Type: Geriatric


Texture: Regular


Liquids: Thin


May have monthly special meal: Yes





- Therapies | Activity


Additional Instructions: 


you may follow up hospice care when you are arrival to [facility]

## 2018-11-28 ENCOUNTER — HOSPITAL ENCOUNTER (OUTPATIENT)
Dept: HOSPITAL 76 - EMS | Age: 83
Discharge: NURSING, REHAB OR LONG-TERM CARE | End: 2018-11-28
Attending: SURGERY
Payer: MEDICARE

## 2018-11-28 DIAGNOSIS — R69: Primary | ICD-10-CM

## 2024-02-12 NOTE — ED PHYSICIAN DOCUMENTATION
PD HPI ABD PAIN





- Stated complaint


Stated Complaint: FAILURE TO THRIVE





- Chief complaint


Chief Complaint: Abd Pain





- History obtained from


History obtained from: Patient, EMS





- History of Present Illness


Timing - onset: How many weeks ago (1)


Timing - duration: Weeks (1)


Timing - details: Gradual onset, Still present


Quality: Other (He was having some difficulty urinating.  He did feel like he 

had fullness in the rectum and thought he might be constipated.  He has been 

nauseous for the past week with poor oral intake and states he has had some 

weight loss over the last week.  He is feeling general weakness.  He has not had

any altered mentation.)


Location: Suprapubic, Other (rectal area)


Radiation: Lower back


Worsened by: Eating


Associated symptoms: Nausea, Weight loss (in the past week).  No: Fever


Recently seen: Emergency Dept, Admitted (for weakness, abd pain, UTI and some 

confusion.)





Review of Systems


Constitutional: reports: Chills, Myalgias.  denies: Fever


Nose: denies: Rhinorrhea / runny nose, Congestion


Throat: denies: Sore throat


Cardiac: denies: Chest pain / pressure, Palpitations


Respiratory: denies: Dyspnea, Cough, Wheezing


GI: reports: Abdominal Pain, Nausea.  denies: Abdominal Swelling, Vomiting, 

Diarrhea


: reports: Dysuria, Frequency.  denies: Hematuria


Skin: denies: Rash


Neurologic: reports: Generalized weakness.  denies: Focal weakness, Numbness, 

Near syncope


Endocrine: reports: Weight loss.  denies: Easy bruising / bleeding





PD PAST MEDICAL HISTORY





- Past Medical History


Past Medical History: Yes


Cardiovascular: Atrial fibrillation


Respiratory: None


Neuro: Dementia, CVA


Endocrine/Autoimmune: HyPOthyroidism


GI: None


: Benign prostate hypertrophy


HEENT: Chronic hearing loss


Psych: None


Musculoskeletal: Osteoarthritis


Derm: None





- Past Surgical History


Past Surgical History: Yes


Ortho: Hip replacement





- Present Medications


Home Medications: 


                                Ambulatory Orders











 Medication  Instructions  Recorded  Confirmed


 


Aspirin 81 mg PO DAILY 07/16/17 10/02/18


 


Tamsulosin HCl 0.4 mg PO DAILY 07/16/17 10/02/18


 


Zolpidem Tartrate 10 mg PO QPM PRN 07/16/17 10/02/18


 


Latanoprost 0.005% Ophth Drops 1 drops EACHEYE QPM 02/17/18 10/02/18





[Xalatan Ophth Drops]   


 


Timolol 0.5% Ophth Drops [Timoptic 1 drops EACHEYE DAILY 02/17/18 10/02/18





0.5% Ophth Drops]   


 


Doxazosin [Cardura] 4 mg PO QPM 06/25/18 10/02/18


 


Levothyroxine Sodium [Synthroid] 100 mcg PO QDAC 07/07/18 10/02/18


 


Pilocarpine HCl 5 mg PO TID 07/07/18 10/02/18


 


raNITIdine [Zantac] 150 mg PO DAILY 07/07/18 10/02/18


 


Docusate Sodium 100 mg PO DAILY #30 capsule 08/26/18 10/02/18


 


Atorvastatin Calcium 40 mg PO QPM 09/22/18 10/02/18


 


Carvedilol 3.125 mg PO BID 09/22/18 10/02/18


 


Fluticasone [Flonase] 1 sprays TRAE DAILY 09/22/18 10/02/18


 


Furosemide 20 mg PO DAILY 09/22/18 10/02/18


 


Lisinopril 5 mg PO DAILY 09/22/18 10/02/18


 


Ferrous Sulfate 325 mg PO DAILY #30 tablet 09/23/18 10/02/18


 


Ipratropium [Atrovent] 2 puffs IH BID 10/02/18 10/02/18














- Allergies


Allergies/Adverse Reactions: 


                                    Allergies











Allergy/AdvReac Type Severity Reaction Status Date / Time


 


No Known Drug Allergies Allergy   Verified 10/02/18 13:10














- Social History


Does the pt smoke?: No


Smoking Status: Former smoker


Does the pt drink ETOH?: No


Does the pt have substance abuse?: No





- Immunizations


Immunizations are current?: Yes





- POLST


Patient has POLST: No


POLST Status: DNR





PD ED PE NORMAL





- Vitals


Vital signs reviewed: Yes





- General


General: Alert and oriented X 3 (with poor short term memory), No acute 

distress, Well developed/nourished





- HEENT


HEENT: Pharynx benign





- Neck


Neck: Supple, no meningeal sign, No adenopathy, No JVD





- Cardiac


Cardiac: RRR, No murmur





- Respiratory


Respiratory: Clear bilaterally





- Abdomen


Abdomen: Normal bowel sounds, Soft, Non distended, No organomegaly, Other 

(tender lower abd/suprpubic area. )





- Male 


Male : Deferred





- Rectal


Rectal: Other (soft stool in vault but large amount of it. Guiac negative though

 darker color. )





- Back


Back: No CVA TTP





- Derm


Derm: Normal color, Warm and dry





- Extremities


Extremities: No tenderness to palpate, Normal ROM s pain, No edema, No calf 

tenderness / cord





- Neuro


Neuro: Alert and oriented X 3, No motor deficit, Normal speech


Eye Opening: Spontaneous


Motor: Obeys Commands


Verbal: Oriented


GCS Score: 15





Results





- Vitals


Vitals: 


                               Vital Signs - 24 hr











  10/02/18





  13:07


 


Temperature 36.4 C L


 


Heart Rate 68


 


Respiratory 19





Rate 


 


Blood Pressure 129/50 L


 


O2 Saturation 100








                                     Oxygen











O2 Source [With Activity]      Room air


 


O2 Source                      Room air

















PD MEDICAL DECISION MAKING





- ED course


Complexity details: reviewed results, re-evaluated patient (He is given IV 

fluids 2 L and also a gram of Rocephin for apparent persistent UTI.  His vitals 

are good and his white count is okay.  His lactate is slightly elevated at 2.3. 

He has been under hydrated with some weight loss recently.  I was assuming he 

this would improve with fluids.  However repeat lactate was worse which has me 

concerned of impending worsening infection.  I talked with the hospitalist who 

will have him in the hospital for further evaluation.), considered differential,

d/w patient





- Sepsis Event


Vital Signs: 


                               Vital Signs - 24 hr











  10/02/18





  13:07


 


Temperature 36.4 C L


 


Heart Rate 68


 


Respiratory 19





Rate 


 


Blood Pressure 129/50 L


 


O2 Saturation 100








                                     Oxygen











O2 Source [With Activity]      Room air


 


O2 Source                      Room air

















Departure





- Departure


Disposition: ED Place in Observation


Clinical Impression: 


 Nausea, Dehydration, Elevated lactic acid level





UTI (urinary tract infection)


Qualifiers:


 Urinary tract infection type: site unspecified Hematuria presence: without 

hematuria Qualified Code(s): N39.0 - Urinary tract infection, site not specified





Condition: Stable


Record reviewed to determine appropriate education?: Yes


Discharge Date/Time: 10/02/18 19:10 My signature below certifies that the above stated patient is homebound and upon completion of the Face-To-Face encounter, has the need for intermittent skilled nursing, physical therapy and/or speech or occupational therapy services in their home for their current diagnosis as outlined in their initial plan of care. These services will continue to be monitored by myself or another physician.

## 2025-02-14 NOTE — HISTORY & PHYSICAL EXAMINATION
DATE OF ADMISSION: 2018

 

ADMITTING PROVIDER:  Lindsey Jackson MD.

 

ATTENDING PROVIDER:   Jimmie Natarajan MD.

 

CHIEF COMPLAINT:  Confusion and suicidal ideation.

 

HISTORY OF PRESENT ILLNESS:  The patient was sent to the emergency room from 
his primary care 

provider's office.  He was being evaluated for chronic pain from his back that 
had worsened after recent falls.  

He was noted to be increasingly confused.  At some point in his back pain 
history he had been placed

on gabapentin and it may or may not have been making the confusion worse or 
started the confusion.  While he was in his 

primary care provider's office, he stated that he was suicidal.  He just wanted 
to die.  He did

not have a clear plan.  And because of that, he was sent to the emergency room.
  But this 

patient is very confused.  When asked about his suicidal ideation, he reported 
to Dr. Alfonso 

that yes, he is suicidal, because of the gabapentin.  But later on in Dr. Alfonso
's evaluation, 

he denied being suicidal and could not remember his previous statement.  By the 
time I got to 

him in my evaluation, the patient was denying any suicidal ideation.  His 
concern was 

epigastric and left mid abdominal pain that had been off and on from "one of 
his falls."  But 

he was vague about which fall.  He remembers tripping over a chair in his 
bedroom and he hurt 

his back, which then made him digress to a long conversation about chronic back 
pain, but he 

was clearly no longer suicidal from my perspective. He also didn't remember 
saying that. He was having word finding

problems, struggled to string together a sentence. Choppy speech, many pauses. 
But alert. 

 

In reviewing the medical record, the impression I am getting is that of an 88-
year-old man who 

is gradually failing with regard to memory, increasing cognitive impairment, 
and falls.  His 

records of falls and confusion date to .  He has had numerous MRIs and 
plain films of his 

back.  MRIs were done in 2009, 2007, nuclear medicine bone scan in 
2010, 

plain films 2012, and May 2014.  All of them show severe multilevel 
degenerative disk 

disease, facet arthropathy, central canal stenosis and foraminal stenosis. No 
imaging was done of his brain during

this time. 

 

He was then admitted for a listing gait and confusion 2018.  He has a 
long 

history of paroxysmal atrial fibrillation for which he is not anticoagulated 
because of his 

history of falls.  He developed a listing gait and confusion in the midst of 
being treated for 

UTI twice that month.  EKGs in 2017 through  document normal sinus rhythm 
with first-degree

AV block.  He does not have atrial fibrillation noted until a 2018 EKG.  
Nevertheless, 

with the 2018 visit for the ataxia/confusion, he was found to have an 
acute left 

paramedian macular stroke, but he also had evidence of old lacunar infarct in 
the bilateral 

basal ganglia, left cerebellum, and left posterior frontal lobe.  So this 
gentleman has been 

having strokes for quite some time.

 

The overall picture, then, is that of an elderly gentleman failing from 
vascular dementia, gait

ataxia, multiple falls, and UTIs from benign prostatic hypertrophy.

 

Bring all of this history forward to his current complaint of suicidal ideation 
and increasing 

confusion in the face of gabapentin.

 

After Dr. Alfonso evaluated him for his suicidal statements and confusion, he 
found him to have mild hyponatremia that is chronic.  His 

sodium has been in the 130s in [id number] and gradually drifting to the 120s in 
2018.  His 

creatinine is staying stable at 1.3-1.5 with a GFR of 44-52.  He does not have 
an elevated 

white cell count.  His urine tox screen is negative.  His urinalysis once again 
shows moderate 

leukocyte esterase, white cells, rare bacteria.  All of his cultures have shown 
contaminated 

specimen or polymicrobial growth since .

 

What was new in Dr. Alfonso's evaluation is that of transverse process 
fractures.  His last 

lumbar spine CT 2018, done because of back pain after another fall, did 
not comment on 

that.  He continues to have severe central canal stenosis and bilateral 
foraminal stenosis.  

Today's head CT done for the confusion shows no acute intracranial abnormality 
and cortical 

atrophy and sequelae of small vessel ischemic changes.

 

As such, the overall picture is not good, but the acute current problem is that 
of confusion, 

self-stated suicidal ideation, in the face of a sedative drug, namely 
gabapentin.  He has 

chronic kidney disease, chronic hyponatremia, and chronic bacteriuria.  He was 
placed in 

observation in the hopes that letting the gabapentin out of his system will 
clear his mentation

and confusion and then allow a safe return to home.

 

PAST MEDICAL HISTORY

    1. 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 
echocardiogram 

       2018.  This was done for his history of stroke.  His ejection 
fraction is 

       40-45%.  He has grade II diastolic dysfunction with severe right atrial 
enlargement, 

       mild left atrial enlargement, mild increased right heart pressures and 
moderate 

       pulmonary regurgitation.

    2. Abdominal aortic aneurysm, status post stent.

    3. Sigmoid diverticulosis with occasional left lower quadrant and left mid 
abdominal pain 

       since .  He has been seen at [facility] for these complaints in 
 and .

    4. Duodenal diverticulosis on same CT scans.

    5. Osteoarthritis with left total hip replacement.

    6. History of right renal scarring on CT from pyelonephritis.

    7. Dysphagia.  Again, this patient has probably been having strokes for an 
unkown length of time. This could be residual deficit

       or presyesophagus.  No brain imaging was done until 2018.  
Prior to this, the dysphagia was evaluated 

       with a barium swallow, which was negative 10/14/2013 and done at [facility].  An 

       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 
aspiration.

    8. Hypertension.

    9. Obstructive sleep apnea.

   10. Tonsillectomy.

   11. Hypothyroidism.

   12. Pulmonary nodules seen on lower cuts of the lung done with CT abdomen 
and pelvis 

       2017.  Six-month followup recommended.

   13. Benign prostatic hypertrophy with bladder wall thickening and 
calcifications of the 

       prostate.

   14. Chronic back pain from multilevel degenerative disc disease and 
foraminal stenosis causing spinal stenosis.

   15. Chronic kidney disease with a GFR of 44-52.

   16. Strokes as above with probable vascular dementia and gait ataxia as 
residuals.

 

ALLERGIES:  NO KNOWN DRUG ALLERGIES.

 

MEDICATIONS

   1. Aspirin 81 mg a day.

   2. Cardura 4 mg a day.

   3. Levothyroxine 100 mcg a day.

   4. Tamsulosin 0.4 mg a day.

   5. Zolpidem 10 mg in the evening as needed.

   6. Timoptic ophthalmic solution.

   7. Latanoprost ophthalmic solution.

   8. Dorzolamide ophthalmic solution.

   9. Atrovent via HFA inhaler.

 

SOCIAL HISTORY:  He was born in [place].  Left [place] when he joined the 
 at the age

of 20 and served for 20 years.  He joined approximately 1949.  He is not 
service connected.  

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 
well as in civilian

life.  When he retired, he retired to [employer].  He had been stationed 
here for a short 

time during [place] and [place] 
they ended up 

here.  He has no history of alcohol abuse.  He smoked starting at the age of 10 
and probably 

smoked half a pack per day until his mid 20s, when he stopped.  He has no 
history of 

recreational substance abuse.  They are not a member of any [employer].  They are 
relatively 

isolated with regard to social interaction and do not have much support at 
home.  They have not

thought about any advanced care planning beyond resuscitative status.  They 
have not thought 

about what they would do if they were too old to take care of themselves.  Wife 
states that "we

will just let our sons decide," but she has never had a conversation with them.
  One son lives 

in [place], one son lives in [place], and one son lives in [facility].  She says that

she sees the [facility] son maybe once a year, the other sons less 
frequently.

 

FAMILY HISTORY:  Mom  at around 94 of old age.  He never knew his birth 
father.  One sister

is not in contact with.  They have just lost contact over the decades of life.  
He does know if

she is alive and he does know if she has had any major medical issues.  As far 
as he knows, his

three sons do not have diabetes, cancer, heart attack, stroke or thyroid 
disease.

 

REVIEW OF SYSTEMS

Difficult to obtain.  This gentleman is delightful, in good spirits, but 
digresses frequently, 

and cannot stay on topic.  He is also a very vague historian and I suspect a 
poor, poor memory 

causes him to be vague in his responses.

CONSTITUTIONAL:  He denies any constitutional changes of unexpected weight loss
, fevers, or 

sweats.

ENT:  Mouth is dry, vision is occasionally blurred, has glaucoma, is mildly 
deaf.

PULMONARY:  Denies coughing, wheezing, shortness of breath.  Denies any phlegm 
production.

CARDIAC:  Does not remember that he has atrial fibrillation.  Denies 
palpitations.  Denies 

orthopnea, edema, chest pain.

GASTROINTESTINAL:  Endorses dysphagia.  Food just feels like it gets stuck in 
the middle of his

chest.  It has been no better or no worse over the last few years.  Every once 
in a while he 

has indigestion.  Stool is dark, but he says he has not had any change in the 
color of the 

stool, quantity of the stool or frequency.  Right now, he has epigastric and 
left mid abdominal

ache.  That complaint surfaced in his evaluation after Dr. Alfonso had already 
seen him.  He is 

vague about when it started.  He cannot tell me what makes it better or worse.  
It is 

nonradiating.  It is described as a steady dull ache.

GENITOURINARY:  Urgency, frequency.  Demonstrated in the emergency room with 
repetitive asking 

for the urinal.  Denies dysuria, hematuria, flank pain.

JOINTS:  Stiff, ache, worse in the morning and gets better as the morning 
progresses.  That 

includes his neck, shoulders, hands, hips and knees.  Denies any recent joint 
effusions.  Has 

severe chronic back pain.

SKIN:  Denies any rashes, new lesions.

PSYCHIATRIC:  Does not remember being suicidal when I speak to him and laughs 
when I gently 

remind him that is the reason he is here.  He corrects me and says no, that he 
is here because 

of his abdominal ache.

CENTRAL NERVOUS SYSTEM:  Denies memory loss, denies deafness, but clearly both 
are evident 

during my evaluation.  Wife, unfortunately, is also suffering from memory loss.
  Denies 

seizures, gets very lightheaded.  With his last hospitalization, he was 
identified as having 

orthostatic hypotension of at least 20 mmHg drop.  He denies any focal 
deficits.  He says his 

balance has been off "forever" and it does not seem to be getting any better.

 

PHYSICAL EXAMINATION

VITAL SIGNS:  Temperature is 36, pulse is 80, blood pressure 163/81, 
respirations 18, 99% on 

room air.

GENERAL:  He is a thin, lanky, cooperative, cheerful, elderly gentleman in no 
acute distress.  

He keeps on repeating that the only reason he is here is because his doctor 
sent him here and 

he is not quite sure why his doctor did that.

ENT:  One small abrasion over the left temple.  Pupils are reactive.  No facial 
asymmetry.  No 

contusions, no lacerations other than a small skin abrasion over the left 
temple.  Oral mucosa 

is slightly dry.  Speech is intact.  Tongue midline.  Gag intact.

NECK:  Supple.  It is amazing that when I have him rotate his neck I can 
actually hear his neck

joints popping and cracking.  No goiter.  No bruits.

LUNGS:  Clear to auscultation and percussion without any crackles, rhonchi, 
wheezing.  No 

increased respiratory effort.

HEART:  PMI is normally placed with an irregular rate and rhythm, systolic 
ejection murmur, but

not laterally displaced PMI.

ABDOMEN:  Mildly tender in the epigastrium and left mid abdomen where he points
, but there is 

no rebound, no guarding.  No abdominal distention and normal bowel sounds.  
There is some 

suprapubic tenderness and aching, but I do not feel his bladder.

EXTREMITIES:  The extremities are thin, and in spite of his history of combined 
systolic and 

diastolic heart failure, there is absolutely no edema.  No signs of chronic 
venous stasis 

changes.  He has mild onychomycosis of the great toenails.  Mildly diminished 
dorsalis pedis 

pulses and I cannot feel posterior tibial pulses.

NEUROLOGIC:  The patient is oriented to self and place.  Does not know the date
, and gives me 

his age as 29.  He then corrects himself and guesses 69.  He does remember his 
birthday.  

Cranial nerves appear intact other than he is slightly to moderately deaf.  He 
has definite 

word-finding deficit.  He has to think quite some time to tell me that he was 
an air traffic 

controller and had to stumble over several phrases before he finally remembered 
what he did for

a living.  I do have to say that I introduced myself at the beginning of this 
evaluation, and 

at the end of the evaluation 20 minutes later I asked him if he remembered my 
name and he did. 

He can follow 1-step commands, but they must be repeated slowly and several 
times.  Simple 

commands such as raise your right hand, squeeze your left hand, wiggle your 
left toes are 

followed after a slight cognitive processing.  But more than 1-step commands 
are not remembered

and he cannot do them.  There is no focal deficit in that he has symmetrical 
hand  strength

that is commensurate with his age and lack of strength overall.  He can lift 
both arms above 

his head for me.  He can lift both legs off the bed, and plantarflexion and 
dorsiflexion 

strength testing is intact.  There are no tremors.  I did not have the patient 
sit up and I did

not test cerebellar.

 

LABORATORY DATA:  Urinalysis has moderate leukocyte esterase with 11-25 white 
cells, rare 

bacteria.  White cell count 6.5, hemoglobin 12.1, hematocrit 36, platelets 256.
  INR 1.2.  

Sodium 129, potassium 4.2, BUN 13, creatinine 1.3, GFR 52.  Liver enzymes 
normal.  Troponin 

less than 0.05.  Toxicology screen is negative for all substances.  Salicylate 
negative.  

Acetaminophen 11.  Alcohol less than 5.

 

IMAGING STUDIES:  Head CT is negative for acute trauma, bleed and signs of old 
stroke and loss 

of volume.  EKG reviewed from 2018 shows sinus rhythm with prolonged NC.  
EKG from 

06/10/2018 shows atrial fibrillation;however, there is an occasional complex 
associated with a 

P-wave.

 

ASSESSMENT AND PLAN

1.  Acute confusion, superimposed on chronic cognitive deficit.  Gabapentin may 
be the most 

likely cause.  In looking at other causes such as infection, stroke, arrhythmia
, congestive 

heart failure, none of those are present other than pyuria.  But no fever, no 
elevated white 

cell count and I do not think he has acute infection.  As such, I agree with 
the conclusion of

drug-induced confusion. As for the statements of suicidal ideation, I believe 
he made them but he 

can't remember now. He doesn't want to die and denies a plan and is surprised I 
even asked.

 

Plan:

Place in observation.

ATTESTATION:  The patient will be in the hospital less than 96 hours.

IV fluids.

Gingerly use pain management.

I would recommend that his ambien be stopped and have explained that to this 
wife. 

 

2.  Chronic systolic and diastolic congestive heart failure.  At this time, 
exam shows him to 

be compensated.  No right-sided heart failure, no crackles, no JVD, no leg edema
, and no increased 

respiratory effort.  He would be a good candidate for an ACE inhibitor (low dose
)

as well as a beta-blocker.  I do not know if that has been attempted with him 
or if compliance 

has been an issue. That and the fact he has orthostatic hypotension.

 

3.  Pyuria.  Again, this was evaluated as a possible cause of his confusion.  
However, he seems

to have chronic pyuria on many urinalyses in our system.  Microbiology dating 
all the way to 

2013 shows all of his cultures to be polymicrobial growth including 
potential pathogens 

suggestive of skin or other contamination.  At this time, we will hold off for 
treating as a 

UTI.

 

4.  Benign prostatic hypertrophy.  Unclear if this patient is taking his Flomax 
or his Cardura.

 We will resume.  However, this patient has orthostatic hypotension noted on 
previous visit.  

Both of these drugs can contribute to that and this can contribute to his 
falls.  He is a poor 

historian with regard to his falls.

 

5.  Cognitive deficit compatible with dementia from vascular dementia as a 
residual of old strokes. Are the strokes

from emboli from his atrial fibrillation? Even if they are he is not a 
candidate for anticoagulation with his risk

of fall and his memory loss (as well as wife).  We will ask  to 
see if there is any support, we can offer this 

unfortunate elderly couple.  I have  also gently suggested to he and his wife 
that it would be appropriate for them to contact their

sons and maybe start initial conversations with regards to what will they do if 
they get too 

old to be living by themselves.

 

6.  Hypertension.  In view of the fact that this gentleman has orthostatic 
hypotension by 

history, we will hold off on treating the hypertension with anything other than 
his Cardura. Will ask nursing to

document his orthostatics. The falls he has are from the orthostasis and his 
ataxia. 

 

7.  Chronic hyponatremia.  May be some indirect sign of dehydration considering 
he is not on 

diuretic, is confused, forgets to take his medications.  We will give 0.9 
normal saline and 

recheck in a.m. Check urine and serum osmolality.

 

8.  Deep venous thrombosis prophylaxis will be MARK hose.

 

9.  DO NOT RESUSCITATE status.  He is very clear that if anything drastic were 
to happen to him

and if he would have a sudden cardiac event with asystole, or bradycardia, he 
does not want a 

pacer, he does not want to be receiving chest compressions, nor does he want to 
be intubated.



10. Chronic back pain. At the very most use low dose opiates. Refer to 
outpatient PT. 

 

 

DD: 2018 23:39

TD: 2018 00:17

Job #: [id number]

MODE 0